# Patient Record
Sex: FEMALE | Race: WHITE | NOT HISPANIC OR LATINO | Employment: OTHER | URBAN - METROPOLITAN AREA
[De-identification: names, ages, dates, MRNs, and addresses within clinical notes are randomized per-mention and may not be internally consistent; named-entity substitution may affect disease eponyms.]

---

## 2018-01-01 ENCOUNTER — HOSPITAL ENCOUNTER (INPATIENT)
Facility: REHABILITATION | Age: 78
LOS: 12 days | DRG: 057 | End: 2018-08-27
Attending: PHYSICAL MEDICINE & REHABILITATION | Admitting: PHYSICAL MEDICINE & REHABILITATION
Payer: MEDICARE

## 2018-01-01 ENCOUNTER — APPOINTMENT (OUTPATIENT)
Dept: RADIOLOGY | Facility: MEDICAL CENTER | Age: 78
DRG: 064 | End: 2018-01-01
Attending: INTERNAL MEDICINE
Payer: MEDICARE

## 2018-01-01 ENCOUNTER — HOSPITAL ENCOUNTER (INPATIENT)
Facility: MEDICAL CENTER | Age: 78
LOS: 4 days | DRG: 065 | End: 2018-08-15
Attending: HOSPITALIST | Admitting: HOSPITALIST
Payer: MEDICARE

## 2018-01-01 ENCOUNTER — APPOINTMENT (OUTPATIENT)
Dept: RADIOLOGY | Facility: REHABILITATION | Age: 78
DRG: 057 | End: 2018-01-01
Attending: PHYSICAL MEDICINE & REHABILITATION
Payer: MEDICARE

## 2018-01-01 ENCOUNTER — APPOINTMENT (OUTPATIENT)
Dept: RADIOLOGY | Facility: REHABILITATION | Age: 78
DRG: 057 | End: 2018-01-01
Attending: HOSPITALIST
Payer: MEDICARE

## 2018-01-01 ENCOUNTER — HOME CARE VISIT (OUTPATIENT)
Dept: HOSPICE | Facility: HOSPICE | Age: 78
End: 2018-01-01
Payer: MEDICARE

## 2018-01-01 ENCOUNTER — APPOINTMENT (OUTPATIENT)
Dept: RADIOLOGY | Facility: MEDICAL CENTER | Age: 78
DRG: 064 | End: 2018-01-01
Attending: NURSE PRACTITIONER
Payer: MEDICARE

## 2018-01-01 ENCOUNTER — HOSPICE ADMISSION (OUTPATIENT)
Dept: HOSPICE | Facility: HOSPICE | Age: 78
End: 2018-01-01
Payer: MEDICARE

## 2018-01-01 ENCOUNTER — HOSPITAL ENCOUNTER (OUTPATIENT)
Dept: RADIOLOGY | Facility: MEDICAL CENTER | Age: 78
End: 2018-08-11

## 2018-01-01 ENCOUNTER — APPOINTMENT (OUTPATIENT)
Dept: RADIOLOGY | Facility: MEDICAL CENTER | Age: 78
DRG: 064 | End: 2018-01-01
Attending: PSYCHIATRY & NEUROLOGY
Payer: MEDICARE

## 2018-01-01 ENCOUNTER — HOSPITAL ENCOUNTER (OUTPATIENT)
Facility: MEDICAL CENTER | Age: 78
DRG: 065 | End: 2018-08-11
Payer: MEDICARE

## 2018-01-01 ENCOUNTER — HOSPITAL ENCOUNTER (INPATIENT)
Facility: MEDICAL CENTER | Age: 78
LOS: 1 days | DRG: 951 | End: 2018-09-19
Attending: INTERNAL MEDICINE | Admitting: INTERNAL MEDICINE
Payer: COMMERCIAL

## 2018-01-01 ENCOUNTER — APPOINTMENT (OUTPATIENT)
Dept: RADIOLOGY | Facility: MEDICAL CENTER | Age: 78
DRG: 065 | End: 2018-01-01
Attending: PSYCHIATRY & NEUROLOGY
Payer: MEDICARE

## 2018-01-01 ENCOUNTER — APPOINTMENT (OUTPATIENT)
Dept: RADIOLOGY | Facility: MEDICAL CENTER | Age: 78
DRG: 065 | End: 2018-01-01
Attending: FAMILY MEDICINE
Payer: MEDICARE

## 2018-01-01 ENCOUNTER — APPOINTMENT (OUTPATIENT)
Dept: PAIN MANAGEMENT | Facility: REHABILITATION | Age: 78
DRG: 057 | End: 2018-01-01
Attending: PHYSICAL MEDICINE & REHABILITATION
Payer: MEDICARE

## 2018-01-01 ENCOUNTER — RESOLUTE PROFESSIONAL BILLING HOSPITAL PROF FEE (OUTPATIENT)
Dept: PHYSICAL MEDICINE AND REHAB | Facility: REHABILITATION | Age: 78
End: 2018-01-01
Payer: MEDICARE

## 2018-01-01 ENCOUNTER — TELEPHONE (OUTPATIENT)
Dept: CARDIOLOGY | Facility: MEDICAL CENTER | Age: 78
End: 2018-01-01

## 2018-01-01 ENCOUNTER — HOSPITAL ENCOUNTER (OUTPATIENT)
Dept: RADIOLOGY | Facility: MEDICAL CENTER | Age: 78
End: 2018-09-11

## 2018-01-01 ENCOUNTER — HOSPITAL ENCOUNTER (INPATIENT)
Facility: MEDICAL CENTER | Age: 78
LOS: 7 days | DRG: 064 | End: 2018-09-18
Attending: EMERGENCY MEDICINE | Admitting: INTERNAL MEDICINE
Payer: MEDICARE

## 2018-01-01 VITALS
OXYGEN SATURATION: 59 % | HEIGHT: 64 IN | SYSTOLIC BLOOD PRESSURE: 75 MMHG | BODY MASS INDEX: 22.02 KG/M2 | DIASTOLIC BLOOD PRESSURE: 36 MMHG | HEART RATE: 69 BPM | TEMPERATURE: 98.9 F | WEIGHT: 129 LBS | RESPIRATION RATE: 20 BRPM

## 2018-01-01 VITALS
TEMPERATURE: 99.1 F | OXYGEN SATURATION: 80 % | DIASTOLIC BLOOD PRESSURE: 68 MMHG | RESPIRATION RATE: 20 BRPM | SYSTOLIC BLOOD PRESSURE: 152 MMHG | BODY MASS INDEX: 22.17 KG/M2 | HEIGHT: 64 IN | WEIGHT: 129.85 LBS | HEART RATE: 82 BPM

## 2018-01-01 VITALS
SYSTOLIC BLOOD PRESSURE: 142 MMHG | WEIGHT: 139.55 LBS | HEIGHT: 64 IN | OXYGEN SATURATION: 91 % | RESPIRATION RATE: 18 BRPM | BODY MASS INDEX: 23.82 KG/M2 | TEMPERATURE: 98.1 F | DIASTOLIC BLOOD PRESSURE: 60 MMHG | HEART RATE: 78 BPM

## 2018-01-01 VITALS
DIASTOLIC BLOOD PRESSURE: 65 MMHG | BODY MASS INDEX: 22.52 KG/M2 | TEMPERATURE: 97.3 F | OXYGEN SATURATION: 94 % | HEIGHT: 65 IN | SYSTOLIC BLOOD PRESSURE: 118 MMHG | RESPIRATION RATE: 15 BRPM | HEART RATE: 72 BPM | WEIGHT: 135.14 LBS

## 2018-01-01 VITALS — RESPIRATION RATE: 28 BRPM | HEART RATE: 100 BPM

## 2018-01-01 DIAGNOSIS — I63.412 CEREBROVASCULAR ACCIDENT (CVA) DUE TO EMBOLISM OF LEFT MIDDLE CEREBRAL ARTERY (HCC): ICD-10-CM

## 2018-01-01 DIAGNOSIS — I63.9 CEREBROVASCULAR ACCIDENT (CVA), UNSPECIFIED MECHANISM (HCC): ICD-10-CM

## 2018-01-01 DIAGNOSIS — I48.0 PAROXYSMAL A-FIB (HCC): ICD-10-CM

## 2018-01-01 DIAGNOSIS — R53.1 RIGHT SIDED WEAKNESS: ICD-10-CM

## 2018-01-01 LAB
25(OH)D3 SERPL-MCNC: 33 NG/ML (ref 30–100)
25(OH)D3 SERPL-MCNC: 40 NG/ML (ref 30–100)
ALBUMIN SERPL BCP-MCNC: 3.2 G/DL (ref 3.2–4.9)
ALBUMIN SERPL BCP-MCNC: 3.3 G/DL (ref 3.2–4.9)
ALBUMIN SERPL BCP-MCNC: 3.5 G/DL (ref 3.2–4.9)
ALBUMIN/GLOB SERPL: 1.2 G/DL
ALBUMIN/GLOB SERPL: 1.4 G/DL
ALBUMIN/GLOB SERPL: 1.4 G/DL
ALP SERPL-CCNC: 78 U/L (ref 30–99)
ALP SERPL-CCNC: 83 U/L (ref 30–99)
ALP SERPL-CCNC: 94 U/L (ref 30–99)
ALT SERPL-CCNC: 11 U/L (ref 2–50)
ALT SERPL-CCNC: 13 U/L (ref 2–50)
ALT SERPL-CCNC: 15 U/L (ref 2–50)
AMMONIA PLAS-SCNC: 35 UMOL/L (ref 11–45)
ANION GAP SERPL CALC-SCNC: 10 MMOL/L (ref 0–11.9)
ANION GAP SERPL CALC-SCNC: 11 MMOL/L (ref 0–11.9)
ANION GAP SERPL CALC-SCNC: 4 MMOL/L (ref 0–11.9)
ANION GAP SERPL CALC-SCNC: 6 MMOL/L (ref 0–11.9)
ANION GAP SERPL CALC-SCNC: 7 MMOL/L (ref 0–11.9)
ANION GAP SERPL CALC-SCNC: 7 MMOL/L (ref 0–11.9)
ANION GAP SERPL CALC-SCNC: 8 MMOL/L (ref 0–11.9)
ANION GAP SERPL CALC-SCNC: 8 MMOL/L (ref 0–11.9)
ANION GAP SERPL CALC-SCNC: 9 MMOL/L (ref 0–11.9)
APPEARANCE UR: ABNORMAL
APPEARANCE UR: ABNORMAL
APPEARANCE UR: CLEAR
AST SERPL-CCNC: 15 U/L (ref 12–45)
AST SERPL-CCNC: 17 U/L (ref 12–45)
AST SERPL-CCNC: 18 U/L (ref 12–45)
BACTERIA #/AREA URNS HPF: ABNORMAL /HPF
BACTERIA #/AREA URNS HPF: NEGATIVE /HPF
BACTERIA UR CULT: ABNORMAL
BACTERIA UR CULT: ABNORMAL
BACTERIA UR CULT: NORMAL
BASOPHILS # BLD AUTO: 0.1 % (ref 0–1.8)
BASOPHILS # BLD AUTO: 0.3 % (ref 0–1.8)
BASOPHILS # BLD AUTO: 0.4 % (ref 0–1.8)
BASOPHILS # BLD AUTO: 0.4 % (ref 0–1.8)
BASOPHILS # BLD: 0.01 K/UL (ref 0–0.12)
BASOPHILS # BLD: 0.03 K/UL (ref 0–0.12)
BASOPHILS # BLD: 0.04 K/UL (ref 0–0.12)
BASOPHILS # BLD: 0.05 K/UL (ref 0–0.12)
BILIRUB SERPL-MCNC: 0.3 MG/DL (ref 0.1–1.5)
BILIRUB SERPL-MCNC: 0.3 MG/DL (ref 0.1–1.5)
BILIRUB SERPL-MCNC: 0.4 MG/DL (ref 0.1–1.5)
BILIRUB UR QL STRIP.AUTO: NEGATIVE
BUN SERPL-MCNC: 11 MG/DL (ref 8–22)
BUN SERPL-MCNC: 11 MG/DL (ref 8–22)
BUN SERPL-MCNC: 13 MG/DL (ref 8–22)
BUN SERPL-MCNC: 14 MG/DL (ref 8–22)
BUN SERPL-MCNC: 18 MG/DL (ref 8–22)
BUN SERPL-MCNC: 24 MG/DL (ref 8–22)
BUN SERPL-MCNC: 27 MG/DL (ref 8–22)
CALCIUM SERPL-MCNC: 8.5 MG/DL (ref 8.5–10.5)
CALCIUM SERPL-MCNC: 8.6 MG/DL (ref 8.5–10.5)
CALCIUM SERPL-MCNC: 8.7 MG/DL (ref 8.5–10.5)
CALCIUM SERPL-MCNC: 8.7 MG/DL (ref 8.5–10.5)
CALCIUM SERPL-MCNC: 9 MG/DL (ref 8.5–10.5)
CALCIUM SERPL-MCNC: 9 MG/DL (ref 8.5–10.5)
CALCIUM SERPL-MCNC: 9.2 MG/DL (ref 8.5–10.5)
CALCIUM SERPL-MCNC: 9.2 MG/DL (ref 8.5–10.5)
CALCIUM SERPL-MCNC: 9.3 MG/DL (ref 8.5–10.5)
CALCIUM SERPL-MCNC: 9.5 MG/DL (ref 8.5–10.5)
CAOX CRY #/AREA URNS HPF: ABNORMAL /HPF
CAOX CRY #/AREA URNS HPF: ABNORMAL /HPF
CHLORIDE SERPL-SCNC: 106 MMOL/L (ref 96–112)
CHLORIDE SERPL-SCNC: 109 MMOL/L (ref 96–112)
CHLORIDE SERPL-SCNC: 110 MMOL/L (ref 96–112)
CHLORIDE SERPL-SCNC: 111 MMOL/L (ref 96–112)
CHLORIDE SERPL-SCNC: 111 MMOL/L (ref 96–112)
CHLORIDE SERPL-SCNC: 112 MMOL/L (ref 96–112)
CHLORIDE SERPL-SCNC: 115 MMOL/L (ref 96–112)
CHOLEST SERPL-MCNC: 115 MG/DL (ref 100–199)
CHOLEST SERPL-MCNC: 93 MG/DL (ref 100–199)
CHOLEST SERPL-MCNC: 94 MG/DL (ref 100–199)
CO2 SERPL-SCNC: 23 MMOL/L (ref 20–33)
CO2 SERPL-SCNC: 23 MMOL/L (ref 20–33)
CO2 SERPL-SCNC: 24 MMOL/L (ref 20–33)
CO2 SERPL-SCNC: 24 MMOL/L (ref 20–33)
CO2 SERPL-SCNC: 25 MMOL/L (ref 20–33)
CO2 SERPL-SCNC: 26 MMOL/L (ref 20–33)
CO2 SERPL-SCNC: 27 MMOL/L (ref 20–33)
CO2 SERPL-SCNC: 29 MMOL/L (ref 20–33)
COLOR UR: ABNORMAL
COLOR UR: YELLOW
CREAT SERPL-MCNC: 0.6 MG/DL (ref 0.5–1.4)
CREAT SERPL-MCNC: 0.64 MG/DL (ref 0.5–1.4)
CREAT SERPL-MCNC: 0.66 MG/DL (ref 0.5–1.4)
CREAT SERPL-MCNC: 0.74 MG/DL (ref 0.5–1.4)
CREAT SERPL-MCNC: 0.81 MG/DL (ref 0.5–1.4)
CREAT SERPL-MCNC: 0.81 MG/DL (ref 0.5–1.4)
CREAT SERPL-MCNC: 0.84 MG/DL (ref 0.5–1.4)
CREAT SERPL-MCNC: 0.86 MG/DL (ref 0.5–1.4)
CREAT SERPL-MCNC: 0.92 MG/DL (ref 0.5–1.4)
CREAT SERPL-MCNC: 1.05 MG/DL (ref 0.5–1.4)
CREAT SERPL-MCNC: 1.08 MG/DL (ref 0.5–1.4)
CREAT SERPL-MCNC: 1.38 MG/DL (ref 0.5–1.4)
EKG IMPRESSION: NORMAL
EOSINOPHIL # BLD AUTO: 0.3 K/UL (ref 0–0.51)
EOSINOPHIL # BLD AUTO: 0.39 K/UL (ref 0–0.51)
EOSINOPHIL # BLD AUTO: 0.5 K/UL (ref 0–0.51)
EOSINOPHIL # BLD AUTO: 0.92 K/UL (ref 0–0.51)
EOSINOPHIL # BLD AUTO: 1.18 K/UL (ref 0–0.51)
EOSINOPHIL # BLD AUTO: 1.3 K/UL (ref 0–0.51)
EOSINOPHIL NFR BLD: 10.6 % (ref 0–6.9)
EOSINOPHIL NFR BLD: 11.3 % (ref 0–6.9)
EOSINOPHIL NFR BLD: 2.5 % (ref 0–6.9)
EOSINOPHIL NFR BLD: 3.7 % (ref 0–6.9)
EOSINOPHIL NFR BLD: 4.3 % (ref 0–6.9)
EOSINOPHIL NFR BLD: 8 % (ref 0–6.9)
EPI CELLS #/AREA URNS HPF: ABNORMAL /HPF
EPI CELLS #/AREA URNS HPF: NEGATIVE /HPF
ERYTHROCYTE [DISTWIDTH] IN BLOOD BY AUTOMATED COUNT: 49.7 FL (ref 35.9–50)
ERYTHROCYTE [DISTWIDTH] IN BLOOD BY AUTOMATED COUNT: 50.5 FL (ref 35.9–50)
ERYTHROCYTE [DISTWIDTH] IN BLOOD BY AUTOMATED COUNT: 50.8 FL (ref 35.9–50)
ERYTHROCYTE [DISTWIDTH] IN BLOOD BY AUTOMATED COUNT: 51.1 FL (ref 35.9–50)
ERYTHROCYTE [DISTWIDTH] IN BLOOD BY AUTOMATED COUNT: 51.2 FL (ref 35.9–50)
ERYTHROCYTE [DISTWIDTH] IN BLOOD BY AUTOMATED COUNT: 51.8 FL (ref 35.9–50)
ERYTHROCYTE [DISTWIDTH] IN BLOOD BY AUTOMATED COUNT: 51.8 FL (ref 35.9–50)
ERYTHROCYTE [DISTWIDTH] IN BLOOD BY AUTOMATED COUNT: 51.9 FL (ref 35.9–50)
ERYTHROCYTE [DISTWIDTH] IN BLOOD BY AUTOMATED COUNT: 53.1 FL (ref 35.9–50)
ERYTHROCYTE [DISTWIDTH] IN BLOOD BY AUTOMATED COUNT: 53.9 FL (ref 35.9–50)
ERYTHROCYTE [DISTWIDTH] IN BLOOD BY AUTOMATED COUNT: 55.8 FL (ref 35.9–50)
EST. AVERAGE GLUCOSE BLD GHB EST-MCNC: 114 MG/DL
EST. AVERAGE GLUCOSE BLD GHB EST-MCNC: 120 MG/DL
EST. AVERAGE GLUCOSE BLD GHB EST-MCNC: 128 MG/DL
FERRITIN SERPL-MCNC: 49.3 NG/ML (ref 10–291)
FOLATE SERPL-MCNC: 23.2 NG/ML
GLOBULIN SER CALC-MCNC: 2.4 G/DL (ref 1.9–3.5)
GLOBULIN SER CALC-MCNC: 2.5 G/DL (ref 1.9–3.5)
GLOBULIN SER CALC-MCNC: 2.7 G/DL (ref 1.9–3.5)
GLUCOSE SERPL-MCNC: 101 MG/DL (ref 65–99)
GLUCOSE SERPL-MCNC: 101 MG/DL (ref 65–99)
GLUCOSE SERPL-MCNC: 111 MG/DL (ref 65–99)
GLUCOSE SERPL-MCNC: 118 MG/DL (ref 65–99)
GLUCOSE SERPL-MCNC: 121 MG/DL (ref 65–99)
GLUCOSE SERPL-MCNC: 142 MG/DL (ref 65–99)
GLUCOSE SERPL-MCNC: 81 MG/DL (ref 65–99)
GLUCOSE SERPL-MCNC: 87 MG/DL (ref 65–99)
GLUCOSE SERPL-MCNC: 88 MG/DL (ref 65–99)
GLUCOSE SERPL-MCNC: 90 MG/DL (ref 65–99)
GLUCOSE SERPL-MCNC: 92 MG/DL (ref 65–99)
GLUCOSE SERPL-MCNC: 98 MG/DL (ref 65–99)
GLUCOSE UR STRIP.AUTO-MCNC: NEGATIVE MG/DL
HBA1C MFR BLD: 5.6 % (ref 0–5.6)
HBA1C MFR BLD: 5.8 % (ref 0–5.6)
HBA1C MFR BLD: 6.1 % (ref 0–5.6)
HCT VFR BLD AUTO: 31.2 % (ref 37–47)
HCT VFR BLD AUTO: 32 % (ref 37–47)
HCT VFR BLD AUTO: 32.5 % (ref 37–47)
HCT VFR BLD AUTO: 32.7 % (ref 37–47)
HCT VFR BLD AUTO: 33.1 % (ref 37–47)
HCT VFR BLD AUTO: 33.4 % (ref 37–47)
HCT VFR BLD AUTO: 34 % (ref 37–47)
HCT VFR BLD AUTO: 34.6 % (ref 37–47)
HCT VFR BLD AUTO: 34.7 % (ref 37–47)
HCT VFR BLD AUTO: 35.4 % (ref 37–47)
HCT VFR BLD AUTO: 35.7 % (ref 37–47)
HDLC SERPL-MCNC: 39 MG/DL
HDLC SERPL-MCNC: 41 MG/DL
HDLC SERPL-MCNC: 49 MG/DL
HGB BLD-MCNC: 10.1 G/DL (ref 12–16)
HGB BLD-MCNC: 10.4 G/DL (ref 12–16)
HGB BLD-MCNC: 10.4 G/DL (ref 12–16)
HGB BLD-MCNC: 10.5 G/DL (ref 12–16)
HGB BLD-MCNC: 10.5 G/DL (ref 12–16)
HGB BLD-MCNC: 10.7 G/DL (ref 12–16)
HGB BLD-MCNC: 10.8 G/DL (ref 12–16)
HGB BLD-MCNC: 11 G/DL (ref 12–16)
HGB BLD-MCNC: 11.1 G/DL (ref 12–16)
HGB BLD-MCNC: 11.4 G/DL (ref 12–16)
HGB BLD-MCNC: 11.4 G/DL (ref 12–16)
HGB RETIC QN AUTO: 29.9 PG/CELL (ref 29–35)
HYALINE CASTS #/AREA URNS LPF: ABNORMAL /LPF
IMM GRANULOCYTES # BLD AUTO: 0.03 K/UL (ref 0–0.11)
IMM GRANULOCYTES # BLD AUTO: 0.03 K/UL (ref 0–0.11)
IMM GRANULOCYTES # BLD AUTO: 0.04 K/UL (ref 0–0.11)
IMM GRANULOCYTES # BLD AUTO: 0.1 K/UL (ref 0–0.11)
IMM GRANULOCYTES NFR BLD AUTO: 0.3 % (ref 0–0.9)
IMM GRANULOCYTES NFR BLD AUTO: 0.4 % (ref 0–0.9)
IMM GRANULOCYTES NFR BLD AUTO: 0.4 % (ref 0–0.9)
IMM GRANULOCYTES NFR BLD AUTO: 0.9 % (ref 0–0.9)
IMM RETICS NFR: 12.7 % (ref 9.3–17.4)
INR PPP: 1.01 (ref 0.87–1.13)
IRON SATN MFR SERPL: 13 % (ref 15–55)
IRON SERPL-MCNC: 35 UG/DL (ref 40–170)
KETONES UR STRIP.AUTO-MCNC: 15 MG/DL
KETONES UR STRIP.AUTO-MCNC: ABNORMAL MG/DL
KETONES UR STRIP.AUTO-MCNC: NEGATIVE MG/DL
LACTATE BLD-SCNC: 1.3 MMOL/L (ref 0.5–2)
LDH SERPL L TO P-CCNC: 139 U/L (ref 107–266)
LDLC SERPL CALC-MCNC: 36 MG/DL
LDLC SERPL CALC-MCNC: 42 MG/DL
LDLC SERPL CALC-MCNC: 52 MG/DL
LEUKOCYTE ESTERASE UR QL STRIP.AUTO: ABNORMAL
LEUKOCYTE ESTERASE UR QL STRIP.AUTO: NEGATIVE
LV EJECT FRACT  99904: 65
LV EJECT FRACT MOD 2C 99903: 78.51
LV EJECT FRACT MOD 4C 99902: 67.11
LV EJECT FRACT MOD BP 99901: 73.45
LYMPHOCYTES # BLD AUTO: 2.13 K/UL (ref 1–4.8)
LYMPHOCYTES # BLD AUTO: 2.38 K/UL (ref 1–4.8)
LYMPHOCYTES # BLD AUTO: 2.81 K/UL (ref 1–4.8)
LYMPHOCYTES # BLD AUTO: 3.79 K/UL (ref 1–4.8)
LYMPHOCYTES # BLD AUTO: 4.63 K/UL (ref 1–4.8)
LYMPHOCYTES # BLD AUTO: 4.77 K/UL (ref 1–4.8)
LYMPHOCYTES NFR BLD: 20.2 % (ref 22–41)
LYMPHOCYTES NFR BLD: 20.5 % (ref 22–41)
LYMPHOCYTES NFR BLD: 23.8 % (ref 22–41)
LYMPHOCYTES NFR BLD: 34.2 % (ref 22–41)
LYMPHOCYTES NFR BLD: 40.4 % (ref 22–41)
LYMPHOCYTES NFR BLD: 41.5 % (ref 22–41)
MAGNESIUM SERPL-MCNC: 1.6 MG/DL (ref 1.5–2.5)
MAGNESIUM SERPL-MCNC: 1.8 MG/DL (ref 1.5–2.5)
MAGNESIUM SERPL-MCNC: 1.9 MG/DL (ref 1.5–2.5)
MCH RBC QN AUTO: 29.5 PG (ref 27–33)
MCH RBC QN AUTO: 29.6 PG (ref 27–33)
MCH RBC QN AUTO: 29.7 PG (ref 27–33)
MCH RBC QN AUTO: 29.8 PG (ref 27–33)
MCH RBC QN AUTO: 29.9 PG (ref 27–33)
MCH RBC QN AUTO: 30.1 PG (ref 27–33)
MCH RBC QN AUTO: 30.2 PG (ref 27–33)
MCH RBC QN AUTO: 30.4 PG (ref 27–33)
MCH RBC QN AUTO: 30.5 PG (ref 27–33)
MCH RBC QN AUTO: 30.6 PG (ref 27–33)
MCH RBC QN AUTO: 30.7 PG (ref 27–33)
MCHC RBC AUTO-ENTMCNC: 30.8 G/DL (ref 33.6–35)
MCHC RBC AUTO-ENTMCNC: 31.1 G/DL (ref 33.6–35)
MCHC RBC AUTO-ENTMCNC: 31.4 G/DL (ref 33.6–35)
MCHC RBC AUTO-ENTMCNC: 31.7 G/DL (ref 33.6–35)
MCHC RBC AUTO-ENTMCNC: 32 G/DL (ref 33.6–35)
MCHC RBC AUTO-ENTMCNC: 32.2 G/DL (ref 33.6–35)
MCHC RBC AUTO-ENTMCNC: 32.4 G/DL (ref 33.6–35)
MCHC RBC AUTO-ENTMCNC: 32.5 G/DL (ref 33.6–35)
MCHC RBC AUTO-ENTMCNC: 32.6 G/DL (ref 33.6–35)
MCHC RBC AUTO-ENTMCNC: 32.7 G/DL (ref 33.6–35)
MCHC RBC AUTO-ENTMCNC: 32.9 G/DL (ref 33.6–35)
MCV RBC AUTO: 91.8 FL (ref 81.4–97.8)
MCV RBC AUTO: 92 FL (ref 81.4–97.8)
MCV RBC AUTO: 93 FL (ref 81.4–97.8)
MCV RBC AUTO: 93.4 FL (ref 81.4–97.8)
MCV RBC AUTO: 93.7 FL (ref 81.4–97.8)
MCV RBC AUTO: 94 FL (ref 81.4–97.8)
MCV RBC AUTO: 94.2 FL (ref 81.4–97.8)
MCV RBC AUTO: 94.6 FL (ref 81.4–97.8)
MCV RBC AUTO: 94.9 FL (ref 81.4–97.8)
MCV RBC AUTO: 95.9 FL (ref 81.4–97.8)
MCV RBC AUTO: 96.2 FL (ref 81.4–97.8)
MICRO URNS: ABNORMAL
MICRO URNS: NORMAL
MONOCYTES # BLD AUTO: 0.62 K/UL (ref 0–0.85)
MONOCYTES # BLD AUTO: 0.65 K/UL (ref 0–0.85)
MONOCYTES # BLD AUTO: 0.76 K/UL (ref 0–0.85)
MONOCYTES # BLD AUTO: 0.76 K/UL (ref 0–0.85)
MONOCYTES # BLD AUTO: 0.81 K/UL (ref 0–0.85)
MONOCYTES # BLD AUTO: 0.81 K/UL (ref 0–0.85)
MONOCYTES NFR BLD AUTO: 5.6 % (ref 0–13.4)
MONOCYTES NFR BLD AUTO: 5.7 % (ref 0–13.4)
MONOCYTES NFR BLD AUTO: 6.4 % (ref 0–13.4)
MONOCYTES NFR BLD AUTO: 7 % (ref 0–13.4)
MONOCYTES NFR BLD AUTO: 7.1 % (ref 0–13.4)
MONOCYTES NFR BLD AUTO: 7.2 % (ref 0–13.4)
NEUTROPHILS # BLD AUTO: 4.69 K/UL (ref 2–7.15)
NEUTROPHILS # BLD AUTO: 5.02 K/UL (ref 2–7.15)
NEUTROPHILS # BLD AUTO: 5.42 K/UL (ref 2–7.15)
NEUTROPHILS # BLD AUTO: 7.24 K/UL (ref 2–7.15)
NEUTROPHILS # BLD AUTO: 7.81 K/UL (ref 2–7.15)
NEUTROPHILS # BLD AUTO: 7.87 K/UL (ref 2–7.15)
NEUTROPHILS NFR BLD: 40.8 % (ref 44–72)
NEUTROPHILS NFR BLD: 43.9 % (ref 44–72)
NEUTROPHILS NFR BLD: 48.8 % (ref 44–72)
NEUTROPHILS NFR BLD: 66.7 % (ref 44–72)
NEUTROPHILS NFR BLD: 67 % (ref 44–72)
NEUTROPHILS NFR BLD: 68.4 % (ref 44–72)
NITRITE UR QL STRIP.AUTO: NEGATIVE
NRBC # BLD AUTO: 0 K/UL
NRBC BLD-RTO: 0 /100 WBC
PH UR STRIP.AUTO: 5 [PH]
PH UR STRIP.AUTO: 5 [PH]
PH UR STRIP.AUTO: 5.5 [PH]
PH UR STRIP.AUTO: 5.5 [PH]
PH UR STRIP.AUTO: 6.5 [PH]
PHOSPHATE SERPL-MCNC: 3.1 MG/DL (ref 2.5–4.5)
PLATELET # BLD AUTO: 226 K/UL (ref 164–446)
PLATELET # BLD AUTO: 228 K/UL (ref 164–446)
PLATELET # BLD AUTO: 228 K/UL (ref 164–446)
PLATELET # BLD AUTO: 229 K/UL (ref 164–446)
PLATELET # BLD AUTO: 229 K/UL (ref 164–446)
PLATELET # BLD AUTO: 238 K/UL (ref 164–446)
PLATELET # BLD AUTO: 250 K/UL (ref 164–446)
PLATELET # BLD AUTO: 254 K/UL (ref 164–446)
PLATELET # BLD AUTO: 256 K/UL (ref 164–446)
PLATELET # BLD AUTO: 257 K/UL (ref 164–446)
PLATELET # BLD AUTO: 266 K/UL (ref 164–446)
PMV BLD AUTO: 10.3 FL (ref 9–12.9)
PMV BLD AUTO: 10.4 FL (ref 9–12.9)
PMV BLD AUTO: 10.7 FL (ref 9–12.9)
PMV BLD AUTO: 10.7 FL (ref 9–12.9)
PMV BLD AUTO: 10.8 FL (ref 9–12.9)
PMV BLD AUTO: 10.9 FL (ref 9–12.9)
PMV BLD AUTO: 11.1 FL (ref 9–12.9)
PMV BLD AUTO: 11.1 FL (ref 9–12.9)
PMV BLD AUTO: 11.2 FL (ref 9–12.9)
PMV BLD AUTO: 11.2 FL (ref 9–12.9)
PMV BLD AUTO: 11.4 FL (ref 9–12.9)
POTASSIUM SERPL-SCNC: 3 MMOL/L (ref 3.6–5.5)
POTASSIUM SERPL-SCNC: 3.2 MMOL/L (ref 3.6–5.5)
POTASSIUM SERPL-SCNC: 3.2 MMOL/L (ref 3.6–5.5)
POTASSIUM SERPL-SCNC: 3.5 MMOL/L (ref 3.6–5.5)
POTASSIUM SERPL-SCNC: 3.6 MMOL/L (ref 3.6–5.5)
POTASSIUM SERPL-SCNC: 3.7 MMOL/L (ref 3.6–5.5)
POTASSIUM SERPL-SCNC: 3.8 MMOL/L (ref 3.6–5.5)
POTASSIUM SERPL-SCNC: 4 MMOL/L (ref 3.6–5.5)
PROCALCITONIN SERPL-MCNC: <0.05 NG/ML
PROT SERPL-MCNC: 5.7 G/DL (ref 6–8.2)
PROT SERPL-MCNC: 5.9 G/DL (ref 6–8.2)
PROT SERPL-MCNC: 6 G/DL (ref 6–8.2)
PROT UR QL STRIP: 30 MG/DL
PROT UR QL STRIP: NEGATIVE MG/DL
PROTHROMBIN TIME: 13 SEC (ref 12–14.6)
RBC # BLD AUTO: 3.32 M/UL (ref 4.2–5.4)
RBC # BLD AUTO: 3.45 M/UL (ref 4.2–5.4)
RBC # BLD AUTO: 3.48 M/UL (ref 4.2–5.4)
RBC # BLD AUTO: 3.49 M/UL (ref 4.2–5.4)
RBC # BLD AUTO: 3.53 M/UL (ref 4.2–5.4)
RBC # BLD AUTO: 3.56 M/UL (ref 4.2–5.4)
RBC # BLD AUTO: 3.62 M/UL (ref 4.2–5.4)
RBC # BLD AUTO: 3.64 M/UL (ref 4.2–5.4)
RBC # BLD AUTO: 3.71 M/UL (ref 4.2–5.4)
RBC # BLD AUTO: 3.73 M/UL (ref 4.2–5.4)
RBC # BLD AUTO: 3.77 M/UL (ref 4.2–5.4)
RBC # URNS HPF: ABNORMAL /HPF
RBC UR QL AUTO: ABNORMAL
RBC UR QL AUTO: ABNORMAL
RBC UR QL AUTO: NEGATIVE
RETICS # AUTO: 0.07 M/UL (ref 0.04–0.06)
RETICS/RBC NFR: 1.8 % (ref 0.8–2.1)
SIGNIFICANT IND 70042: ABNORMAL
SIGNIFICANT IND 70042: NORMAL
SITE SITE: ABNORMAL
SITE SITE: NORMAL
SODIUM SERPL-SCNC: 140 MMOL/L (ref 135–145)
SODIUM SERPL-SCNC: 142 MMOL/L (ref 135–145)
SODIUM SERPL-SCNC: 143 MMOL/L (ref 135–145)
SODIUM SERPL-SCNC: 143 MMOL/L (ref 135–145)
SODIUM SERPL-SCNC: 144 MMOL/L (ref 135–145)
SODIUM SERPL-SCNC: 145 MMOL/L (ref 135–145)
SODIUM SERPL-SCNC: 146 MMOL/L (ref 135–145)
SODIUM SERPL-SCNC: 146 MMOL/L (ref 135–145)
SOURCE SOURCE: ABNORMAL
SOURCE SOURCE: NORMAL
SP GR UR STRIP.AUTO: 1.01
SP GR UR STRIP.AUTO: 1.02
SP GR UR STRIP.AUTO: 1.02
T4 FREE SERPL-MCNC: 1.2 NG/DL (ref 0.53–1.43)
T4 FREE SERPL-MCNC: 1.4 NG/DL (ref 0.53–1.43)
TIBC SERPL-MCNC: 273 UG/DL (ref 250–450)
TRANSFERRIN SERPL-MCNC: 197 MG/DL (ref 200–370)
TRIGL SERPL-MCNC: 67 MG/DL (ref 0–149)
TRIGL SERPL-MCNC: 72 MG/DL (ref 0–149)
TRIGL SERPL-MCNC: 80 MG/DL (ref 0–149)
TROPONIN I SERPL-MCNC: 0.02 NG/ML (ref 0–0.04)
TSH SERPL DL<=0.005 MIU/L-ACNC: 0.24 UIU/ML (ref 0.38–5.33)
TSH SERPL DL<=0.005 MIU/L-ACNC: 1.67 UIU/ML (ref 0.38–5.33)
TSH SERPL DL<=0.005 MIU/L-ACNC: 5.88 UIU/ML (ref 0.38–5.33)
UROBILINOGEN UR STRIP.AUTO-MCNC: 0.2 MG/DL
VIT B12 SERPL-MCNC: >1500 PG/ML (ref 211–911)
WBC # BLD AUTO: 10.6 K/UL (ref 4.8–10.8)
WBC # BLD AUTO: 11.1 K/UL (ref 4.8–10.8)
WBC # BLD AUTO: 11.2 K/UL (ref 4.8–10.8)
WBC # BLD AUTO: 11.4 K/UL (ref 4.8–10.8)
WBC # BLD AUTO: 11.5 K/UL (ref 4.8–10.8)
WBC # BLD AUTO: 11.5 K/UL (ref 4.8–10.8)
WBC # BLD AUTO: 11.6 K/UL (ref 4.8–10.8)
WBC # BLD AUTO: 11.8 K/UL (ref 4.8–10.8)
WBC # BLD AUTO: 12.9 K/UL (ref 4.8–10.8)
WBC # BLD AUTO: 8.9 K/UL (ref 4.8–10.8)
WBC # BLD AUTO: 9.5 K/UL (ref 4.8–10.8)
WBC #/AREA URNS HPF: ABNORMAL /HPF

## 2018-01-01 PROCEDURE — A9270 NON-COVERED ITEM OR SERVICE: HCPCS | Performed by: HOSPITALIST

## 2018-01-01 PROCEDURE — 80048 BASIC METABOLIC PNL TOTAL CA: CPT

## 2018-01-01 PROCEDURE — 700102 HCHG RX REV CODE 250 W/ 637 OVERRIDE(OP): Performed by: INTERNAL MEDICINE

## 2018-01-01 PROCEDURE — 97162 PT EVAL MOD COMPLEX 30 MIN: CPT

## 2018-01-01 PROCEDURE — 80053 COMPREHEN METABOLIC PANEL: CPT

## 2018-01-01 PROCEDURE — 97116 GAIT TRAINING THERAPY: CPT

## 2018-01-01 PROCEDURE — 700102 HCHG RX REV CODE 250 W/ 637 OVERRIDE(OP): Performed by: HOSPITALIST

## 2018-01-01 PROCEDURE — 99232 SBSQ HOSP IP/OBS MODERATE 35: CPT | Performed by: INTERNAL MEDICINE

## 2018-01-01 PROCEDURE — 99232 SBSQ HOSP IP/OBS MODERATE 35: CPT | Performed by: PHYSICAL MEDICINE & REHABILITATION

## 2018-01-01 PROCEDURE — 84484 ASSAY OF TROPONIN QUANT: CPT

## 2018-01-01 PROCEDURE — 700111 HCHG RX REV CODE 636 W/ 250 OVERRIDE (IP): Performed by: PSYCHIATRY & NEUROLOGY

## 2018-01-01 PROCEDURE — 700105 HCHG RX REV CODE 258: Performed by: FAMILY MEDICINE

## 2018-01-01 PROCEDURE — 770020 HCHG ROOM/CARE - TELE (206)

## 2018-01-01 PROCEDURE — A9270 NON-COVERED ITEM OR SERVICE: HCPCS | Performed by: INTERNAL MEDICINE

## 2018-01-01 PROCEDURE — 700111 HCHG RX REV CODE 636 W/ 250 OVERRIDE (IP): Performed by: PHYSICAL MEDICINE & REHABILITATION

## 2018-01-01 PROCEDURE — A9270 NON-COVERED ITEM OR SERVICE: HCPCS | Performed by: PHYSICAL MEDICINE & REHABILITATION

## 2018-01-01 PROCEDURE — 82140 ASSAY OF AMMONIA: CPT

## 2018-01-01 PROCEDURE — G0299 HHS/HOSPICE OF RN EA 15 MIN: HCPCS

## 2018-01-01 PROCEDURE — 700111 HCHG RX REV CODE 636 W/ 250 OVERRIDE (IP): Performed by: NURSE PRACTITIONER

## 2018-01-01 PROCEDURE — 700101 HCHG RX REV CODE 250: Performed by: NURSE PRACTITIONER

## 2018-01-01 PROCEDURE — 4A00X4Z MEASUREMENT OF CENTRAL NERVOUS ELECTRICAL ACTIVITY, EXTERNAL APPROACH: ICD-10-PCS | Performed by: PSYCHIATRY & NEUROLOGY

## 2018-01-01 PROCEDURE — 770010 HCHG ROOM/CARE - REHAB SEMI PRIVAT*

## 2018-01-01 PROCEDURE — 99232 SBSQ HOSP IP/OBS MODERATE 35: CPT | Performed by: HOSPITALIST

## 2018-01-01 PROCEDURE — 700102 HCHG RX REV CODE 250 W/ 637 OVERRIDE(OP): Performed by: NURSE PRACTITIONER

## 2018-01-01 PROCEDURE — 85025 COMPLETE CBC W/AUTO DIFF WBC: CPT

## 2018-01-01 PROCEDURE — 36415 COLL VENOUS BLD VENIPUNCTURE: CPT

## 2018-01-01 PROCEDURE — 770006 HCHG ROOM/CARE - MED/SURG/GYN SEMI*

## 2018-01-01 PROCEDURE — 84466 ASSAY OF TRANSFERRIN: CPT

## 2018-01-01 PROCEDURE — 92526 ORAL FUNCTION THERAPY: CPT

## 2018-01-01 PROCEDURE — 70498 CT ANGIOGRAPHY NECK: CPT

## 2018-01-01 PROCEDURE — 700101 HCHG RX REV CODE 250: Performed by: INTERNAL MEDICINE

## 2018-01-01 PROCEDURE — 97530 THERAPEUTIC ACTIVITIES: CPT

## 2018-01-01 PROCEDURE — 770022 HCHG ROOM/CARE - ICU (200)

## 2018-01-01 PROCEDURE — G8988 SELF CARE GOAL STATUS: HCPCS | Mod: CK

## 2018-01-01 PROCEDURE — 70547 MR ANGIOGRAPHY NECK W/O DYE: CPT

## 2018-01-01 PROCEDURE — 700102 HCHG RX REV CODE 250 W/ 637 OVERRIDE(OP): Performed by: PHYSICAL MEDICINE & REHABILITATION

## 2018-01-01 PROCEDURE — 700111 HCHG RX REV CODE 636 W/ 250 OVERRIDE (IP): Performed by: HOSPITALIST

## 2018-01-01 PROCEDURE — 92508 TX SP LANG VOICE COMM GROUP: CPT

## 2018-01-01 PROCEDURE — 92612 ENDOSCOPY SWALLOW (FEES) VID: CPT

## 2018-01-01 PROCEDURE — G8987 SELF CARE CURRENT STATUS: HCPCS | Mod: CJ

## 2018-01-01 PROCEDURE — 700111 HCHG RX REV CODE 636 W/ 250 OVERRIDE (IP): Performed by: INTERNAL MEDICINE

## 2018-01-01 PROCEDURE — 70551 MRI BRAIN STEM W/O DYE: CPT

## 2018-01-01 PROCEDURE — A9270 NON-COVERED ITEM OR SERVICE: HCPCS | Performed by: NURSE PRACTITIONER

## 2018-01-01 PROCEDURE — 700102 HCHG RX REV CODE 250 W/ 637 OVERRIDE(OP): Performed by: FAMILY MEDICINE

## 2018-01-01 PROCEDURE — 90471 IMMUNIZATION ADMIN: CPT

## 2018-01-01 PROCEDURE — 82306 VITAMIN D 25 HYDROXY: CPT

## 2018-01-01 PROCEDURE — 97535 SELF CARE MNGMENT TRAINING: CPT

## 2018-01-01 PROCEDURE — A9270 NON-COVERED ITEM OR SERVICE: HCPCS | Performed by: FAMILY MEDICINE

## 2018-01-01 PROCEDURE — 74230 X-RAY XM SWLNG FUNCJ C+: CPT

## 2018-01-01 PROCEDURE — 700105 HCHG RX REV CODE 258: Performed by: INTERNAL MEDICINE

## 2018-01-01 PROCEDURE — 84439 ASSAY OF FREE THYROXINE: CPT

## 2018-01-01 PROCEDURE — 770021 HCHG ROOM/CARE - ISO PRIVATE

## 2018-01-01 PROCEDURE — 71045 X-RAY EXAM CHEST 1 VIEW: CPT

## 2018-01-01 PROCEDURE — 97161 PT EVAL LOW COMPLEX 20 MIN: CPT

## 2018-01-01 PROCEDURE — 81003 URINALYSIS AUTO W/O SCOPE: CPT

## 2018-01-01 PROCEDURE — 92507 TX SP LANG VOICE COMM INDIV: CPT

## 2018-01-01 PROCEDURE — 83550 IRON BINDING TEST: CPT

## 2018-01-01 PROCEDURE — 99223 1ST HOSP IP/OBS HIGH 75: CPT | Performed by: HOSPITALIST

## 2018-01-01 PROCEDURE — 99239 HOSP IP/OBS DSCHRG MGMT >30: CPT | Performed by: HOSPITALIST

## 2018-01-01 PROCEDURE — 97110 THERAPEUTIC EXERCISES: CPT

## 2018-01-01 PROCEDURE — 83735 ASSAY OF MAGNESIUM: CPT

## 2018-01-01 PROCEDURE — 97112 NEUROMUSCULAR REEDUCATION: CPT

## 2018-01-01 PROCEDURE — 99239 HOSP IP/OBS DSCHRG MGMT >30: CPT | Performed by: INTERNAL MEDICINE

## 2018-01-01 PROCEDURE — 306565 RIGID MIT RESTRAINT(PAIR): Performed by: INTERNAL MEDICINE

## 2018-01-01 PROCEDURE — 700111 HCHG RX REV CODE 636 W/ 250 OVERRIDE (IP): Performed by: FAMILY MEDICINE

## 2018-01-01 PROCEDURE — 3E0234Z INTRODUCTION OF SERUM, TOXOID AND VACCINE INTO MUSCLE, PERCUTANEOUS APPROACH: ICD-10-PCS | Performed by: INTERNAL MEDICINE

## 2018-01-01 PROCEDURE — 82607 VITAMIN B-12: CPT

## 2018-01-01 PROCEDURE — G8980 MOBILITY D/C STATUS: HCPCS | Mod: CM

## 2018-01-01 PROCEDURE — 93005 ELECTROCARDIOGRAM TRACING: CPT | Performed by: FAMILY MEDICINE

## 2018-01-01 PROCEDURE — 92610 EVALUATE SWALLOWING FUNCTION: CPT

## 2018-01-01 PROCEDURE — 700105 HCHG RX REV CODE 258: Performed by: NURSE PRACTITIONER

## 2018-01-01 PROCEDURE — 87086 URINE CULTURE/COLONY COUNT: CPT

## 2018-01-01 PROCEDURE — 93010 ELECTROCARDIOGRAM REPORT: CPT | Performed by: INTERNAL MEDICINE

## 2018-01-01 PROCEDURE — 99223 1ST HOSP IP/OBS HIGH 75: CPT | Performed by: INTERNAL MEDICINE

## 2018-01-01 PROCEDURE — 85046 RETICYTE/HGB CONCENTRATE: CPT

## 2018-01-01 PROCEDURE — 83605 ASSAY OF LACTIC ACID: CPT

## 2018-01-01 PROCEDURE — 94640 AIRWAY INHALATION TREATMENT: CPT

## 2018-01-01 PROCEDURE — 665036 HSPC NOTICE OF ELECTION NOE

## 2018-01-01 PROCEDURE — 82728 ASSAY OF FERRITIN: CPT

## 2018-01-01 PROCEDURE — G8979 MOBILITY GOAL STATUS: HCPCS | Mod: CK

## 2018-01-01 PROCEDURE — G8997 SWALLOW GOAL STATUS: HCPCS | Mod: CJ

## 2018-01-01 PROCEDURE — 81001 URINALYSIS AUTO W/SCOPE: CPT

## 2018-01-01 PROCEDURE — 92611 MOTION FLUOROSCOPY/SWALLOW: CPT

## 2018-01-01 PROCEDURE — 84443 ASSAY THYROID STIM HORMONE: CPT

## 2018-01-01 PROCEDURE — 80061 LIPID PANEL: CPT

## 2018-01-01 PROCEDURE — 85027 COMPLETE CBC AUTOMATED: CPT

## 2018-01-01 PROCEDURE — 302136 NUTRITION PUMP: Performed by: INTERNAL MEDICINE

## 2018-01-01 PROCEDURE — 99291 CRITICAL CARE FIRST HOUR: CPT | Mod: GC | Performed by: INTERNAL MEDICINE

## 2018-01-01 PROCEDURE — 700117 HCHG RX CONTRAST REV CODE 255: Performed by: PSYCHIATRY & NEUROLOGY

## 2018-01-01 PROCEDURE — 97165 OT EVAL LOW COMPLEX 30 MIN: CPT

## 2018-01-01 PROCEDURE — T2045 HOSPICE GENERAL CARE: HCPCS

## 2018-01-01 PROCEDURE — 84100 ASSAY OF PHOSPHORUS: CPT

## 2018-01-01 PROCEDURE — 99233 SBSQ HOSP IP/OBS HIGH 50: CPT | Performed by: HOSPITALIST

## 2018-01-01 PROCEDURE — 99233 SBSQ HOSP IP/OBS HIGH 50: CPT | Performed by: INTERNAL MEDICINE

## 2018-01-01 PROCEDURE — 99239 HOSP IP/OBS DSCHRG MGMT >30: CPT | Performed by: PHYSICAL MEDICINE & REHABILITATION

## 2018-01-01 PROCEDURE — 700102 HCHG RX REV CODE 250 W/ 637 OVERRIDE(OP): Performed by: PSYCHIATRY & NEUROLOGY

## 2018-01-01 PROCEDURE — G8988 SELF CARE GOAL STATUS: HCPCS | Mod: CI

## 2018-01-01 PROCEDURE — G0156 HHCP-SVS OF AIDE,EA 15 MIN: HCPCS

## 2018-01-01 PROCEDURE — L4398 FOOT DROP SPLINT PRE OTS: HCPCS

## 2018-01-01 PROCEDURE — 70450 CT HEAD/BRAIN W/O DYE: CPT

## 2018-01-01 PROCEDURE — G8996 SWALLOW CURRENT STATUS: HCPCS | Mod: CM

## 2018-01-01 PROCEDURE — G8978 MOBILITY CURRENT STATUS: HCPCS | Mod: CM

## 2018-01-01 PROCEDURE — 90662 IIV NO PRSV INCREASED AG IM: CPT | Performed by: INTERNAL MEDICINE

## 2018-01-01 PROCEDURE — G8979 MOBILITY GOAL STATUS: HCPCS | Mod: CI

## 2018-01-01 PROCEDURE — G8996 SWALLOW CURRENT STATUS: HCPCS | Mod: CJ

## 2018-01-01 PROCEDURE — 99291 CRITICAL CARE FIRST HOUR: CPT | Performed by: INTERNAL MEDICINE

## 2018-01-01 PROCEDURE — 92523 SPEECH SOUND LANG COMPREHEN: CPT

## 2018-01-01 PROCEDURE — 80048 BASIC METABOLIC PNL TOTAL CA: CPT | Mod: 91

## 2018-01-01 PROCEDURE — 93005 ELECTROCARDIOGRAM TRACING: CPT | Performed by: HOSPITALIST

## 2018-01-01 PROCEDURE — 82746 ASSAY OF FOLIC ACID SERUM: CPT

## 2018-01-01 PROCEDURE — G8997 SWALLOW GOAL STATUS: HCPCS | Mod: CK

## 2018-01-01 PROCEDURE — 95951 EEG: CPT | Mod: 52

## 2018-01-01 PROCEDURE — 700105 HCHG RX REV CODE 258: Performed by: PSYCHIATRY & NEUROLOGY

## 2018-01-01 PROCEDURE — 93880 EXTRACRANIAL BILAT STUDY: CPT

## 2018-01-01 PROCEDURE — G8978 MOBILITY CURRENT STATUS: HCPCS | Mod: CI

## 2018-01-01 PROCEDURE — 97167 OT EVAL HIGH COMPLEX 60 MIN: CPT

## 2018-01-01 PROCEDURE — 93306 TTE W/DOPPLER COMPLETE: CPT

## 2018-01-01 PROCEDURE — 76775 US EXAM ABDO BACK WALL LIM: CPT

## 2018-01-01 PROCEDURE — G8979 MOBILITY GOAL STATUS: HCPCS | Mod: CM

## 2018-01-01 PROCEDURE — 770001 HCHG ROOM/CARE - MED/SURG/GYN PRIV*

## 2018-01-01 PROCEDURE — 94760 N-INVAS EAR/PLS OXIMETRY 1: CPT

## 2018-01-01 PROCEDURE — 93306 TTE W/DOPPLER COMPLETE: CPT | Mod: 26 | Performed by: INTERNAL MEDICINE

## 2018-01-01 PROCEDURE — 306802 CATHETER,INSTAFLOW BOWEL: Performed by: INTERNAL MEDICINE

## 2018-01-01 PROCEDURE — 96105 ASSESSMENT OF APHASIA: CPT

## 2018-01-01 PROCEDURE — G8996 SWALLOW CURRENT STATUS: HCPCS | Mod: CL

## 2018-01-01 PROCEDURE — 83540 ASSAY OF IRON: CPT

## 2018-01-01 PROCEDURE — 70496 CT ANGIOGRAPHY HEAD: CPT

## 2018-01-01 PROCEDURE — A9270 NON-COVERED ITEM OR SERVICE: HCPCS | Performed by: PSYCHIATRY & NEUROLOGY

## 2018-01-01 PROCEDURE — 97150 GROUP THERAPEUTIC PROCEDURES: CPT

## 2018-01-01 PROCEDURE — 70544 MR ANGIOGRAPHY HEAD W/O DYE: CPT

## 2018-01-01 PROCEDURE — 83036 HEMOGLOBIN GLYCOSYLATED A1C: CPT

## 2018-01-01 PROCEDURE — 99223 1ST HOSP IP/OBS HIGH 75: CPT | Performed by: FAMILY MEDICINE

## 2018-01-01 PROCEDURE — 87077 CULTURE AEROBIC IDENTIFY: CPT

## 2018-01-01 PROCEDURE — 93880 EXTRACRANIAL BILAT STUDY: CPT | Mod: 26 | Performed by: SURGERY

## 2018-01-01 PROCEDURE — G0155 HHCP-SVS OF CSW,EA 15 MIN: HCPCS

## 2018-01-01 PROCEDURE — 84145 PROCALCITONIN (PCT): CPT

## 2018-01-01 PROCEDURE — G8996 SWALLOW CURRENT STATUS: HCPCS | Mod: CK

## 2018-01-01 PROCEDURE — 99291 CRITICAL CARE FIRST HOUR: CPT

## 2018-01-01 PROCEDURE — 99233 SBSQ HOSP IP/OBS HIGH 50: CPT | Performed by: PHYSICAL MEDICINE & REHABILITATION

## 2018-01-01 PROCEDURE — 302112 WASHCLOTH,PERINEAL CARE: Performed by: INTERNAL MEDICINE

## 2018-01-01 PROCEDURE — 90670 PCV13 VACCINE IM: CPT | Performed by: INTERNAL MEDICINE

## 2018-01-01 PROCEDURE — 700101 HCHG RX REV CODE 250

## 2018-01-01 PROCEDURE — G8987 SELF CARE CURRENT STATUS: HCPCS | Mod: CM

## 2018-01-01 PROCEDURE — 83615 LACTATE (LD) (LDH) ENZYME: CPT

## 2018-01-01 PROCEDURE — 85610 PROTHROMBIN TIME: CPT

## 2018-01-01 PROCEDURE — 87186 SC STD MICRODIL/AGAR DIL: CPT

## 2018-01-01 PROCEDURE — G8997 SWALLOW GOAL STATUS: HCPCS | Mod: CI

## 2018-01-01 PROCEDURE — 71046 X-RAY EXAM CHEST 2 VIEWS: CPT

## 2018-01-01 RX ORDER — POTASSIUM CHLORIDE 20 MEQ/1
20 TABLET, EXTENDED RELEASE ORAL ONCE
Status: COMPLETED | OUTPATIENT
Start: 2018-01-01 | End: 2018-01-01

## 2018-01-01 RX ORDER — LORAZEPAM 2 MG/ML
2 CONCENTRATE ORAL
Status: DISCONTINUED | OUTPATIENT
Start: 2018-01-01 | End: 2018-01-01 | Stop reason: HOSPADM

## 2018-01-01 RX ORDER — TRIAMTERENE AND HYDROCHLOROTHIAZIDE 37.5; 25 MG/1; MG/1
1 TABLET ORAL EVERY MORNING
Status: ON HOLD | COMMUNITY
End: 2018-01-01

## 2018-01-01 RX ORDER — ASPIRIN 300 MG/1
300 SUPPOSITORY RECTAL DAILY
Status: DISCONTINUED | OUTPATIENT
Start: 2018-01-01 | End: 2018-01-01

## 2018-01-01 RX ORDER — ONDANSETRON 2 MG/ML
4 INJECTION INTRAMUSCULAR; INTRAVENOUS 4 TIMES DAILY PRN
Status: DISCONTINUED | OUTPATIENT
Start: 2018-01-01 | End: 2018-01-01 | Stop reason: HOSPADM

## 2018-01-01 RX ORDER — GLYCOPYRROLATE 0.2 MG/ML
0.2 INJECTION INTRAMUSCULAR; INTRAVENOUS EVERY 4 HOURS PRN
Status: DISCONTINUED | OUTPATIENT
Start: 2018-01-01 | End: 2018-01-01 | Stop reason: HOSPADM

## 2018-01-01 RX ORDER — POLYETHYLENE GLYCOL 3350 17 G/17G
17 POWDER, FOR SOLUTION ORAL
Refills: 3 | Status: ON HOLD
Start: 2018-01-01 | End: 2018-01-01

## 2018-01-01 RX ORDER — LOSARTAN POTASSIUM 50 MG/1
50 TABLET ORAL
Status: DISCONTINUED | OUTPATIENT
Start: 2018-01-01 | End: 2018-01-01

## 2018-01-01 RX ORDER — MORPHINE SULFATE 10 MG/ML
5 INJECTION, SOLUTION INTRAMUSCULAR; INTRAVENOUS
Status: DISCONTINUED | OUTPATIENT
Start: 2018-01-01 | End: 2018-01-01 | Stop reason: HOSPADM

## 2018-01-01 RX ORDER — ATORVASTATIN CALCIUM 40 MG/1
40 TABLET, FILM COATED ORAL DAILY
Qty: 30 TAB | Refills: 2 | Status: ON HOLD | OUTPATIENT
Start: 2018-01-01 | End: 2018-01-01

## 2018-01-01 RX ORDER — BISACODYL 10 MG
10 SUPPOSITORY, RECTAL RECTAL
Status: DISCONTINUED | OUTPATIENT
Start: 2018-01-01 | End: 2018-01-01 | Stop reason: HOSPADM

## 2018-01-01 RX ORDER — IPRATROPIUM BROMIDE AND ALBUTEROL SULFATE 2.5; .5 MG/3ML; MG/3ML
3 SOLUTION RESPIRATORY (INHALATION)
Status: DISCONTINUED | OUTPATIENT
Start: 2018-01-01 | End: 2018-01-01 | Stop reason: HOSPADM

## 2018-01-01 RX ORDER — ECHINACEA PURPUREA EXTRACT 125 MG
2 TABLET ORAL PRN
Status: DISCONTINUED | OUTPATIENT
Start: 2018-01-01 | End: 2018-01-01 | Stop reason: HOSPADM

## 2018-01-01 RX ORDER — WARFARIN SODIUM 5 MG/1
5 TABLET ORAL
Status: DISCONTINUED | OUTPATIENT
Start: 2018-01-01 | End: 2018-01-01

## 2018-01-01 RX ORDER — NAPROXEN 500 MG/1
500 TABLET ORAL DAILY
Status: ON HOLD | COMMUNITY
End: 2018-01-01

## 2018-01-01 RX ORDER — ATORVASTATIN CALCIUM 40 MG/1
40 TABLET, FILM COATED ORAL EVERY EVENING
Status: DISCONTINUED | OUTPATIENT
Start: 2018-01-01 | End: 2018-01-01 | Stop reason: HOSPADM

## 2018-01-01 RX ORDER — AMOXICILLIN 250 MG
2 CAPSULE ORAL 2 TIMES DAILY PRN
Status: DISCONTINUED | OUTPATIENT
Start: 2018-01-01 | End: 2018-01-01 | Stop reason: HOSPADM

## 2018-01-01 RX ORDER — ASPIRIN 81 MG/1
81 TABLET, CHEWABLE ORAL DAILY
Status: DISCONTINUED | OUTPATIENT
Start: 2018-01-01 | End: 2018-01-01 | Stop reason: HOSPADM

## 2018-01-01 RX ORDER — HEPARIN SODIUM 5000 [USP'U]/ML
5000 INJECTION, SOLUTION INTRAVENOUS; SUBCUTANEOUS EVERY 12 HOURS
Status: CANCELLED | OUTPATIENT
Start: 2018-01-01

## 2018-01-01 RX ORDER — POLYETHYLENE GLYCOL 3350 17 G/17G
1 POWDER, FOR SOLUTION ORAL
Status: DISCONTINUED | OUTPATIENT
Start: 2018-01-01 | End: 2018-01-01

## 2018-01-01 RX ORDER — LEVETIRACETAM 500 MG/1
500 TABLET ORAL 2 TIMES DAILY
Status: DISCONTINUED | OUTPATIENT
Start: 2018-01-01 | End: 2018-01-01

## 2018-01-01 RX ORDER — LOSARTAN POTASSIUM 100 MG/1
100 TABLET ORAL DAILY
Status: ON HOLD | COMMUNITY
End: 2018-01-01

## 2018-01-01 RX ORDER — SCOLOPAMINE TRANSDERMAL SYSTEM 1 MG/1
1 PATCH, EXTENDED RELEASE TRANSDERMAL
Status: DISCONTINUED | OUTPATIENT
Start: 2018-01-01 | End: 2018-01-01 | Stop reason: HOSPADM

## 2018-01-01 RX ORDER — ONDANSETRON 2 MG/ML
8 INJECTION INTRAMUSCULAR; INTRAVENOUS EVERY 8 HOURS PRN
Status: DISCONTINUED | OUTPATIENT
Start: 2018-01-01 | End: 2018-01-01 | Stop reason: HOSPADM

## 2018-01-01 RX ORDER — ASPIRIN 81 MG/1
324 TABLET, CHEWABLE ORAL DAILY
Status: DISCONTINUED | OUTPATIENT
Start: 2018-01-01 | End: 2018-01-01

## 2018-01-01 RX ORDER — HEPARIN SODIUM 5000 [USP'U]/ML
5000 INJECTION, SOLUTION INTRAVENOUS; SUBCUTANEOUS EVERY 12 HOURS
Status: DISCONTINUED | OUTPATIENT
Start: 2018-01-01 | End: 2018-01-01

## 2018-01-01 RX ORDER — ASPIRIN 81 MG/1
81 TABLET, CHEWABLE ORAL DAILY
Status: DISCONTINUED | OUTPATIENT
Start: 2018-01-01 | End: 2018-01-01

## 2018-01-01 RX ORDER — HEPARIN SODIUM 5000 [USP'U]/ML
5000 INJECTION, SOLUTION INTRAVENOUS; SUBCUTANEOUS EVERY 8 HOURS
Status: DISCONTINUED | OUTPATIENT
Start: 2018-01-01 | End: 2018-01-01

## 2018-01-01 RX ORDER — METOPROLOL SUCCINATE 25 MG/1
25 TABLET, EXTENDED RELEASE ORAL
Status: DISCONTINUED | OUTPATIENT
Start: 2018-01-01 | End: 2018-01-01

## 2018-01-01 RX ORDER — AMOXICILLIN 250 MG
2 CAPSULE ORAL 2 TIMES DAILY
Status: DISCONTINUED | OUTPATIENT
Start: 2018-01-01 | End: 2018-01-01

## 2018-01-01 RX ORDER — POTASSIUM CHLORIDE 20 MEQ/1
40 TABLET, EXTENDED RELEASE ORAL ONCE
Status: COMPLETED | OUTPATIENT
Start: 2018-01-01 | End: 2018-01-01

## 2018-01-01 RX ORDER — LOSARTAN POTASSIUM 50 MG/1
100 TABLET ORAL
Status: DISCONTINUED | OUTPATIENT
Start: 2018-01-01 | End: 2018-01-01

## 2018-01-01 RX ORDER — LORATADINE 10 MG/1
10 TABLET ORAL DAILY
Status: DISCONTINUED | OUTPATIENT
Start: 2018-01-01 | End: 2018-01-01 | Stop reason: HOSPADM

## 2018-01-01 RX ORDER — ASPIRIN 81 MG/1
81 TABLET, CHEWABLE ORAL DAILY
Qty: 100 TAB | Refills: 2 | Status: ON HOLD | OUTPATIENT
Start: 2018-01-01 | End: 2018-01-01

## 2018-01-01 RX ORDER — METOPROLOL SUCCINATE 25 MG/1
25 TABLET, EXTENDED RELEASE ORAL DAILY
Qty: 30 TAB | Refills: 0 | Status: ON HOLD
Start: 2018-01-01 | End: 2018-01-01

## 2018-01-01 RX ORDER — AMOXICILLIN 250 MG
2 CAPSULE ORAL 2 TIMES DAILY
Status: DISCONTINUED | OUTPATIENT
Start: 2018-01-01 | End: 2018-01-01 | Stop reason: HOSPADM

## 2018-01-01 RX ORDER — BISACODYL 10 MG
10 SUPPOSITORY, RECTAL RECTAL
Status: DISCONTINUED | OUTPATIENT
Start: 2018-01-01 | End: 2018-01-01

## 2018-01-01 RX ORDER — HEPARIN SODIUM 5000 [USP'U]/ML
5000 INJECTION, SOLUTION INTRAVENOUS; SUBCUTANEOUS EVERY 12 HOURS
Status: DISCONTINUED | OUTPATIENT
Start: 2018-01-01 | End: 2018-01-01 | Stop reason: HOSPADM

## 2018-01-01 RX ORDER — FLUCONAZOLE 100 MG/1
100 TABLET ORAL DAILY
Status: COMPLETED | OUTPATIENT
Start: 2018-01-01 | End: 2018-01-01

## 2018-01-01 RX ORDER — ASPIRIN 81 MG/1
81 TABLET, CHEWABLE ORAL DAILY
Status: CANCELLED | OUTPATIENT
Start: 2018-01-01

## 2018-01-01 RX ORDER — ATORVASTATIN CALCIUM 40 MG/1
40 TABLET, FILM COATED ORAL DAILY
Status: DISCONTINUED | OUTPATIENT
Start: 2018-01-01 | End: 2018-01-01

## 2018-01-01 RX ORDER — LABETALOL HYDROCHLORIDE 5 MG/ML
10 INJECTION, SOLUTION INTRAVENOUS EVERY 4 HOURS PRN
Status: DISCONTINUED | OUTPATIENT
Start: 2018-01-01 | End: 2018-01-01 | Stop reason: HOSPADM

## 2018-01-01 RX ORDER — METOPROLOL SUCCINATE 25 MG/1
25 TABLET, EXTENDED RELEASE ORAL
Status: CANCELLED | OUTPATIENT
Start: 2018-01-01

## 2018-01-01 RX ORDER — IPRATROPIUM BROMIDE AND ALBUTEROL SULFATE 2.5; .5 MG/3ML; MG/3ML
SOLUTION RESPIRATORY (INHALATION)
Status: COMPLETED
Start: 2018-01-01 | End: 2018-01-01

## 2018-01-01 RX ORDER — OXYMETAZOLINE HYDROCHLORIDE 0.05 G/100ML
2 SPRAY NASAL
Status: DISPENSED | OUTPATIENT
Start: 2018-01-01 | End: 2018-01-01

## 2018-01-01 RX ORDER — METRONIDAZOLE 500 MG/1
500 TABLET ORAL EVERY 8 HOURS
Status: COMPLETED | OUTPATIENT
Start: 2018-01-01 | End: 2018-01-01

## 2018-01-01 RX ORDER — LORAZEPAM 2 MG/ML
1-3 INJECTION INTRAMUSCULAR
Status: DISCONTINUED | OUTPATIENT
Start: 2018-01-01 | End: 2018-01-01 | Stop reason: HOSPADM

## 2018-01-01 RX ORDER — ASPIRIN 81 MG/1
81 TABLET, CHEWABLE ORAL DAILY
Qty: 100 TAB | Status: ON HOLD
Start: 2018-01-01 | End: 2018-01-01

## 2018-01-01 RX ORDER — AMOXICILLIN 250 MG
2 CAPSULE ORAL 2 TIMES DAILY
Qty: 30 TAB | Refills: 0 | Status: ON HOLD
Start: 2018-01-01 | End: 2018-01-01

## 2018-01-01 RX ORDER — LORAZEPAM 2 MG/ML
1 CONCENTRATE ORAL
Status: DISCONTINUED | OUTPATIENT
Start: 2018-01-01 | End: 2018-01-01 | Stop reason: HOSPADM

## 2018-01-01 RX ORDER — METOPROLOL SUCCINATE 50 MG/1
50 TABLET, EXTENDED RELEASE ORAL DAILY
Status: ON HOLD | COMMUNITY
End: 2018-01-01

## 2018-01-01 RX ORDER — MORPHINE SULFATE 100 MG/5ML
10 SOLUTION ORAL
Status: DISCONTINUED | OUTPATIENT
Start: 2018-01-01 | End: 2018-01-01 | Stop reason: HOSPADM

## 2018-01-01 RX ORDER — LEVOTHYROXINE SODIUM 0.1 MG/1
100 TABLET ORAL
Qty: 30 TAB | Refills: 2 | Status: ON HOLD | OUTPATIENT
Start: 2018-01-01 | End: 2018-01-01

## 2018-01-01 RX ORDER — LEVOTHYROXINE SODIUM 0.1 MG/1
100 TABLET ORAL
Status: ON HOLD | COMMUNITY
End: 2018-01-01

## 2018-01-01 RX ORDER — ONDANSETRON 4 MG/1
4 TABLET, ORALLY DISINTEGRATING ORAL 4 TIMES DAILY PRN
Status: DISCONTINUED | OUTPATIENT
Start: 2018-01-01 | End: 2018-01-01 | Stop reason: HOSPADM

## 2018-01-01 RX ORDER — METOPROLOL SUCCINATE 25 MG/1
25 TABLET, EXTENDED RELEASE ORAL
Status: DISCONTINUED | OUTPATIENT
Start: 2018-01-01 | End: 2018-01-01 | Stop reason: HOSPADM

## 2018-01-01 RX ORDER — ACETAMINOPHEN 325 MG/1
650 TABLET ORAL EVERY 4 HOURS PRN
Status: DISCONTINUED | OUTPATIENT
Start: 2018-01-01 | End: 2018-01-01 | Stop reason: HOSPADM

## 2018-01-01 RX ORDER — POLYVINYL ALCOHOL 14 MG/ML
1 SOLUTION/ DROPS OPHTHALMIC PRN
Status: DISCONTINUED | OUTPATIENT
Start: 2018-01-01 | End: 2018-01-01 | Stop reason: HOSPADM

## 2018-01-01 RX ORDER — METOPROLOL SUCCINATE 25 MG/1
25 TABLET, EXTENDED RELEASE ORAL DAILY
Status: DISCONTINUED | OUTPATIENT
Start: 2018-01-01 | End: 2018-01-01 | Stop reason: HOSPADM

## 2018-01-01 RX ORDER — LOSARTAN POTASSIUM 50 MG/1
50 TABLET ORAL ONCE
Status: COMPLETED | OUTPATIENT
Start: 2018-01-01 | End: 2018-01-01

## 2018-01-01 RX ORDER — LEVOFLOXACIN 250 MG/1
500 TABLET, FILM COATED ORAL DAILY
Status: COMPLETED | OUTPATIENT
Start: 2018-01-01 | End: 2018-01-01

## 2018-01-01 RX ORDER — POLYVINYL ALCOHOL 14 MG/ML
2 SOLUTION/ DROPS OPHTHALMIC PRN
Status: DISCONTINUED | OUTPATIENT
Start: 2018-01-01 | End: 2018-01-01 | Stop reason: HOSPADM

## 2018-01-01 RX ORDER — SODIUM CHLORIDE, SODIUM LACTATE, POTASSIUM CHLORIDE, CALCIUM CHLORIDE 600; 310; 30; 20 MG/100ML; MG/100ML; MG/100ML; MG/100ML
INJECTION, SOLUTION INTRAVENOUS CONTINUOUS
Status: DISCONTINUED | OUTPATIENT
Start: 2018-01-01 | End: 2018-01-01

## 2018-01-01 RX ORDER — POLYETHYLENE GLYCOL 3350 17 G/17G
1 POWDER, FOR SOLUTION ORAL
Status: DISCONTINUED | OUTPATIENT
Start: 2018-01-01 | End: 2018-01-01 | Stop reason: HOSPADM

## 2018-01-01 RX ORDER — ASPIRIN 325 MG
325 TABLET ORAL DAILY
Status: DISCONTINUED | OUTPATIENT
Start: 2018-01-01 | End: 2018-01-01

## 2018-01-01 RX ORDER — HYDRALAZINE HYDROCHLORIDE 25 MG/1
25 TABLET, FILM COATED ORAL EVERY 8 HOURS PRN
Status: DISCONTINUED | OUTPATIENT
Start: 2018-01-01 | End: 2018-01-01 | Stop reason: HOSPADM

## 2018-01-01 RX ORDER — LEVETIRACETAM 100 MG/ML
500 SOLUTION ORAL EVERY 12 HOURS
Status: DISCONTINUED | OUTPATIENT
Start: 2018-01-01 | End: 2018-01-01

## 2018-01-01 RX ORDER — MORPHINE SULFATE 10 MG/ML
10 INJECTION, SOLUTION INTRAMUSCULAR; INTRAVENOUS
Status: DISCONTINUED | OUTPATIENT
Start: 2018-01-01 | End: 2018-01-01 | Stop reason: HOSPADM

## 2018-01-01 RX ORDER — LOSARTAN POTASSIUM 100 MG/1
100 TABLET ORAL DAILY
Qty: 30 TAB | Refills: 2 | Status: ON HOLD | OUTPATIENT
Start: 2018-01-01 | End: 2018-01-01

## 2018-01-01 RX ORDER — HYDRALAZINE HYDROCHLORIDE 20 MG/ML
10 INJECTION INTRAMUSCULAR; INTRAVENOUS
Status: DISCONTINUED | OUTPATIENT
Start: 2018-01-01 | End: 2018-01-01 | Stop reason: HOSPADM

## 2018-01-01 RX ORDER — ALUMINA, MAGNESIA, AND SIMETHICONE 2400; 2400; 240 MG/30ML; MG/30ML; MG/30ML
20 SUSPENSION ORAL
Status: DISCONTINUED | OUTPATIENT
Start: 2018-01-01 | End: 2018-01-01 | Stop reason: HOSPADM

## 2018-01-01 RX ORDER — SODIUM CHLORIDE AND POTASSIUM CHLORIDE 150; 900 MG/100ML; MG/100ML
INJECTION, SOLUTION INTRAVENOUS CONTINUOUS
Status: DISCONTINUED | OUTPATIENT
Start: 2018-01-01 | End: 2018-01-01

## 2018-01-01 RX ORDER — LOSARTAN POTASSIUM 25 MG/1
25 TABLET ORAL DAILY
Status: DISCONTINUED | OUTPATIENT
Start: 2018-01-01 | End: 2018-01-01 | Stop reason: HOSPADM

## 2018-01-01 RX ORDER — LEVOTHYROXINE SODIUM 0.05 MG/1
100 TABLET ORAL
Status: DISCONTINUED | OUTPATIENT
Start: 2018-01-01 | End: 2018-01-01 | Stop reason: HOSPADM

## 2018-01-01 RX ORDER — LEVOTHYROXINE SODIUM 0.1 MG/1
100 TABLET ORAL
Status: DISCONTINUED | OUTPATIENT
Start: 2018-01-01 | End: 2018-01-01

## 2018-01-01 RX ORDER — MIDAZOLAM HYDROCHLORIDE 1 MG/ML
1-2 INJECTION INTRAMUSCULAR; INTRAVENOUS
Status: DISCONTINUED | OUTPATIENT
Start: 2018-01-01 | End: 2018-01-01

## 2018-01-01 RX ORDER — MICONAZOLE NITRATE 20 MG/G
CREAM TOPICAL PRN
Status: DISCONTINUED | OUTPATIENT
Start: 2018-01-01 | End: 2018-01-01 | Stop reason: HOSPADM

## 2018-01-01 RX ORDER — ATORVASTATIN CALCIUM 40 MG/1
40 TABLET, FILM COATED ORAL NIGHTLY
Status: ON HOLD | COMMUNITY
End: 2018-01-01

## 2018-01-01 RX ORDER — POLYVINYL ALCOHOL 14 MG/ML
2 SOLUTION/ DROPS OPHTHALMIC EVERY 6 HOURS PRN
Status: DISCONTINUED | OUTPATIENT
Start: 2018-01-01 | End: 2018-01-01 | Stop reason: HOSPADM

## 2018-01-01 RX ORDER — TRAZODONE HYDROCHLORIDE 50 MG/1
50 TABLET ORAL
Status: DISCONTINUED | OUTPATIENT
Start: 2018-01-01 | End: 2018-01-01 | Stop reason: HOSPADM

## 2018-01-01 RX ORDER — ONDANSETRON 2 MG/ML
4 INJECTION INTRAMUSCULAR; INTRAVENOUS EVERY 4 HOURS PRN
Status: DISCONTINUED | OUTPATIENT
Start: 2018-01-01 | End: 2018-01-01 | Stop reason: HOSPADM

## 2018-01-01 RX ORDER — TRAMADOL HYDROCHLORIDE 50 MG/1
50 TABLET ORAL EVERY 4 HOURS PRN
Status: DISCONTINUED | OUTPATIENT
Start: 2018-01-01 | End: 2018-01-01 | Stop reason: HOSPADM

## 2018-01-01 RX ORDER — POTASSIUM CHLORIDE 7.45 MG/ML
10 INJECTION INTRAVENOUS
Status: COMPLETED | OUTPATIENT
Start: 2018-01-01 | End: 2018-01-01

## 2018-01-01 RX ORDER — ONDANSETRON 4 MG/1
4 TABLET, ORALLY DISINTEGRATING ORAL EVERY 4 HOURS PRN
Status: DISCONTINUED | OUTPATIENT
Start: 2018-01-01 | End: 2018-01-01 | Stop reason: HOSPADM

## 2018-01-01 RX ORDER — LORAZEPAM 2 MG/ML
1 INJECTION INTRAMUSCULAR ONCE
Status: COMPLETED | OUTPATIENT
Start: 2018-01-01 | End: 2018-01-01

## 2018-01-01 RX ORDER — METOPROLOL SUCCINATE 25 MG/1
25 TABLET, EXTENDED RELEASE ORAL DAILY
Qty: 30 TAB | Refills: 2 | Status: ON HOLD | OUTPATIENT
Start: 2018-01-01 | End: 2018-01-01

## 2018-01-01 RX ORDER — SODIUM CHLORIDE 9 MG/ML
INJECTION, SOLUTION INTRAVENOUS CONTINUOUS
Status: ACTIVE | OUTPATIENT
Start: 2018-01-01 | End: 2018-01-01

## 2018-01-01 RX ORDER — LABETALOL HYDROCHLORIDE 5 MG/ML
10 INJECTION, SOLUTION INTRAVENOUS EVERY 4 HOURS PRN
Status: DISCONTINUED | OUTPATIENT
Start: 2018-01-01 | End: 2018-01-01

## 2018-01-01 RX ORDER — ATORVASTATIN CALCIUM 40 MG/1
40 TABLET, FILM COATED ORAL EVERY EVENING
Status: CANCELLED | OUTPATIENT
Start: 2018-01-01

## 2018-01-01 RX ORDER — HYDRALAZINE HYDROCHLORIDE 20 MG/ML
10 INJECTION INTRAMUSCULAR; INTRAVENOUS EVERY 6 HOURS PRN
Status: DISCONTINUED | OUTPATIENT
Start: 2018-01-01 | End: 2018-01-01

## 2018-01-01 RX ORDER — GLYCOPYRROLATE 1 MG/1
1 TABLET ORAL 3 TIMES DAILY PRN
Status: DISCONTINUED | OUTPATIENT
Start: 2018-01-01 | End: 2018-01-01 | Stop reason: HOSPADM

## 2018-01-01 RX ORDER — LORAZEPAM 2 MG/ML
1 INJECTION INTRAMUSCULAR
Status: DISCONTINUED | OUTPATIENT
Start: 2018-01-01 | End: 2018-01-01 | Stop reason: HOSPADM

## 2018-01-01 RX ORDER — HALOPERIDOL 5 MG/ML
2-5 INJECTION INTRAMUSCULAR EVERY 4 HOURS PRN
Status: DISCONTINUED | OUTPATIENT
Start: 2018-01-01 | End: 2018-01-01

## 2018-01-01 RX ORDER — LOSARTAN POTASSIUM 25 MG/1
25 TABLET ORAL
Status: DISCONTINUED | OUTPATIENT
Start: 2018-01-01 | End: 2018-01-01

## 2018-01-01 RX ORDER — METOPROLOL SUCCINATE 25 MG/1
25 TABLET, EXTENDED RELEASE ORAL DAILY
Status: DISCONTINUED | OUTPATIENT
Start: 2018-01-01 | End: 2018-01-01

## 2018-01-01 RX ORDER — ONDANSETRON 2 MG/ML
4 INJECTION INTRAMUSCULAR; INTRAVENOUS EVERY 6 HOURS PRN
Status: DISCONTINUED | OUTPATIENT
Start: 2018-01-01 | End: 2018-01-01 | Stop reason: HOSPADM

## 2018-01-01 RX ORDER — MORPHINE SULFATE 100 MG/5ML
10-20 SOLUTION ORAL
Status: DISCONTINUED | OUTPATIENT
Start: 2018-01-01 | End: 2018-01-01 | Stop reason: HOSPADM

## 2018-01-01 RX ORDER — ONDANSETRON 4 MG/1
8 TABLET, ORALLY DISINTEGRATING ORAL EVERY 8 HOURS PRN
Status: DISCONTINUED | OUTPATIENT
Start: 2018-01-01 | End: 2018-01-01 | Stop reason: HOSPADM

## 2018-01-01 RX ADMIN — LOSARTAN POTASSIUM 25 MG: 25 TABLET, FILM COATED ORAL at 05:11

## 2018-01-01 RX ADMIN — METRONIDAZOLE 500 MG: 500 TABLET ORAL at 20:19

## 2018-01-01 RX ADMIN — HEPARIN SODIUM 5000 UNITS: 5000 INJECTION, SOLUTION INTRAVENOUS; SUBCUTANEOUS at 15:38

## 2018-01-01 RX ADMIN — NYSTATIN 500000 UNITS: 100000 SUSPENSION ORAL at 15:09

## 2018-01-01 RX ADMIN — HEPARIN SODIUM 5000 UNITS: 5000 INJECTION, SOLUTION INTRAVENOUS; SUBCUTANEOUS at 05:20

## 2018-01-01 RX ADMIN — LEVETIRACETAM 500 MG: 500 TABLET ORAL at 18:07

## 2018-01-01 RX ADMIN — HALOPERIDOL LACTATE 5 MG: 5 INJECTION, SOLUTION INTRAMUSCULAR at 01:13

## 2018-01-01 RX ADMIN — LORAZEPAM 1 MG: 2 INJECTION INTRAMUSCULAR; INTRAVENOUS at 13:09

## 2018-01-01 RX ADMIN — SCOPALAMINE 1 PATCH: 1 PATCH, EXTENDED RELEASE TRANSDERMAL at 17:32

## 2018-01-01 RX ADMIN — METOPROLOL TARTRATE 25 MG: 25 TABLET, FILM COATED ORAL at 05:49

## 2018-01-01 RX ADMIN — HEPARIN SODIUM 5000 UNITS: 5000 INJECTION, SOLUTION INTRAVENOUS; SUBCUTANEOUS at 09:15

## 2018-01-01 RX ADMIN — POTASSIUM CHLORIDE 20 MEQ: 1500 TABLET, EXTENDED RELEASE ORAL at 11:42

## 2018-01-01 RX ADMIN — HEPARIN SODIUM 5000 UNITS: 5000 INJECTION, SOLUTION INTRAVENOUS; SUBCUTANEOUS at 08:51

## 2018-01-01 RX ADMIN — NYSTATIN 500000 UNITS: 100000 SUSPENSION ORAL at 23:48

## 2018-01-01 RX ADMIN — HEPARIN SODIUM 5000 UNITS: 5000 INJECTION, SOLUTION INTRAVENOUS; SUBCUTANEOUS at 21:17

## 2018-01-01 RX ADMIN — LEVOTHYROXINE SODIUM 100 MCG: 50 TABLET ORAL at 05:11

## 2018-01-01 RX ADMIN — ASPIRIN 81 MG 81 MG: 81 TABLET ORAL at 08:41

## 2018-01-01 RX ADMIN — LORAZEPAM 1 MG: 2 INJECTION INTRAMUSCULAR; INTRAVENOUS at 10:00

## 2018-01-01 RX ADMIN — HEPARIN SODIUM 5000 UNITS: 5000 INJECTION, SOLUTION INTRAVENOUS; SUBCUTANEOUS at 10:33

## 2018-01-01 RX ADMIN — METRONIDAZOLE 500 MG: 500 TABLET ORAL at 14:22

## 2018-01-01 RX ADMIN — ATORVASTATIN CALCIUM 40 MG: 40 TABLET, FILM COATED ORAL at 17:13

## 2018-01-01 RX ADMIN — LOSARTAN POTASSIUM 25 MG: 25 TABLET, FILM COATED ORAL at 09:02

## 2018-01-01 RX ADMIN — LEVOFLOXACIN 500 MG: 250 TABLET, FILM COATED ORAL at 09:02

## 2018-01-01 RX ADMIN — ATORVASTATIN CALCIUM 40 MG: 40 TABLET, FILM COATED ORAL at 21:57

## 2018-01-01 RX ADMIN — LOSARTAN POTASSIUM 25 MG: 25 TABLET, FILM COATED ORAL at 08:24

## 2018-01-01 RX ADMIN — METRONIDAZOLE 500 MG: 500 TABLET ORAL at 21:17

## 2018-01-01 RX ADMIN — LORATADINE 10 MG: 10 TABLET ORAL at 08:24

## 2018-01-01 RX ADMIN — POTASSIUM CHLORIDE 40 MEQ: 1500 TABLET, EXTENDED RELEASE ORAL at 14:03

## 2018-01-01 RX ADMIN — SODIUM CHLORIDE: 9 INJECTION, SOLUTION INTRAVENOUS at 15:43

## 2018-01-01 RX ADMIN — ASPIRIN 81 MG 81 MG: 81 TABLET ORAL at 09:30

## 2018-01-01 RX ADMIN — LOSARTAN POTASSIUM 50 MG: 50 TABLET ORAL at 05:21

## 2018-01-01 RX ADMIN — ATORVASTATIN CALCIUM 40 MG: 40 TABLET, FILM COATED ORAL at 21:24

## 2018-01-01 RX ADMIN — LEVOTHYROXINE SODIUM 100 MCG: 50 TABLET ORAL at 05:32

## 2018-01-01 RX ADMIN — LEVOFLOXACIN 500 MG: 250 TABLET, FILM COATED ORAL at 08:31

## 2018-01-01 RX ADMIN — ASPIRIN 81 MG: 81 TABLET, CHEWABLE ORAL at 05:22

## 2018-01-01 RX ADMIN — HEPARIN SODIUM 5000 UNITS: 5000 INJECTION, SOLUTION INTRAVENOUS; SUBCUTANEOUS at 06:14

## 2018-01-01 RX ADMIN — LOSARTAN POTASSIUM 25 MG: 25 TABLET, FILM COATED ORAL at 08:12

## 2018-01-01 RX ADMIN — METRONIDAZOLE 500 MG: 500 TABLET ORAL at 22:00

## 2018-01-01 RX ADMIN — METRONIDAZOLE 500 MG: 500 TABLET ORAL at 05:22

## 2018-01-01 RX ADMIN — ATORVASTATIN CALCIUM 40 MG: 40 TABLET, FILM COATED ORAL at 17:32

## 2018-01-01 RX ADMIN — IOHEXOL 100 ML: 350 INJECTION, SOLUTION INTRAVENOUS at 17:47

## 2018-01-01 RX ADMIN — MORPHINE SULFATE 5 MG: 10 INJECTION INTRAVENOUS at 05:47

## 2018-01-01 RX ADMIN — ASPIRIN 81 MG 81 MG: 81 TABLET ORAL at 08:33

## 2018-01-01 RX ADMIN — LEVOTHYROXINE SODIUM 100 MCG: 50 TABLET ORAL at 05:29

## 2018-01-01 RX ADMIN — NYSTATIN 500000 UNITS: 100000 SUSPENSION ORAL at 17:44

## 2018-01-01 RX ADMIN — METOPROLOL SUCCINATE 25 MG: 25 TABLET, EXTENDED RELEASE ORAL at 04:51

## 2018-01-01 RX ADMIN — ASPIRIN 81 MG: 81 TABLET, CHEWABLE ORAL at 05:25

## 2018-01-01 RX ADMIN — Medication 400 MG: at 08:33

## 2018-01-01 RX ADMIN — NICOTINE 7 MG: 7 PATCH, EXTENDED RELEASE TRANSDERMAL at 06:00

## 2018-01-01 RX ADMIN — LOSARTAN POTASSIUM 50 MG: 50 TABLET ORAL at 04:51

## 2018-01-01 RX ADMIN — LEVOTHYROXINE SODIUM 100 MCG: 50 TABLET ORAL at 05:22

## 2018-01-01 RX ADMIN — ATORVASTATIN CALCIUM 40 MG: 40 TABLET, FILM COATED ORAL at 05:48

## 2018-01-01 RX ADMIN — STANDARDIZED SENNA CONCENTRATE AND DOCUSATE SODIUM 2 TABLET: 8.6; 5 TABLET, FILM COATED ORAL at 19:47

## 2018-01-01 RX ADMIN — LABETALOL HYDROCHLORIDE 10 MG: 5 INJECTION, SOLUTION INTRAVENOUS at 15:42

## 2018-01-01 RX ADMIN — HEPARIN SODIUM 5000 UNITS: 5000 INJECTION, SOLUTION INTRAVENOUS; SUBCUTANEOUS at 21:08

## 2018-01-01 RX ADMIN — LEVOTHYROXINE SODIUM 100 MCG: 100 TABLET ORAL at 04:51

## 2018-01-01 RX ADMIN — HEPARIN SODIUM 5000 UNITS: 5000 INJECTION, SOLUTION INTRAVENOUS; SUBCUTANEOUS at 11:35

## 2018-01-01 RX ADMIN — NICOTINE 7 MG: 7 PATCH, EXTENDED RELEASE TRANSDERMAL at 05:11

## 2018-01-01 RX ADMIN — MORPHINE SULFATE 5 MG: 10 INJECTION INTRAVENOUS at 13:20

## 2018-01-01 RX ADMIN — FLUCONAZOLE 100 MG: 100 TABLET ORAL at 08:41

## 2018-01-01 RX ADMIN — METOPROLOL SUCCINATE 25 MG: 25 TABLET, EXTENDED RELEASE ORAL at 09:02

## 2018-01-01 RX ADMIN — HEPARIN SODIUM 5000 UNITS: 5000 INJECTION, SOLUTION INTRAVENOUS; SUBCUTANEOUS at 09:41

## 2018-01-01 RX ADMIN — HEPARIN SODIUM 5000 UNITS: 5000 INJECTION, SOLUTION INTRAVENOUS; SUBCUTANEOUS at 19:48

## 2018-01-01 RX ADMIN — FLUCONAZOLE 100 MG: 100 TABLET ORAL at 08:33

## 2018-01-01 RX ADMIN — SODIUM CHLORIDE 500 MG: 9 INJECTION, SOLUTION INTRAVENOUS at 12:41

## 2018-01-01 RX ADMIN — LEVETIRACETAM 500 MG: 500 TABLET ORAL at 05:21

## 2018-01-01 RX ADMIN — METOPROLOL SUCCINATE 25 MG: 25 TABLET, EXTENDED RELEASE ORAL at 08:12

## 2018-01-01 RX ADMIN — METRONIDAZOLE 500 MG: 500 TABLET ORAL at 14:41

## 2018-01-01 RX ADMIN — METOPROLOL SUCCINATE 25 MG: 25 TABLET, EXTENDED RELEASE ORAL at 08:33

## 2018-01-01 RX ADMIN — POTASSIUM CHLORIDE 10 MEQ: 10 INJECTION, SOLUTION INTRAVENOUS at 10:15

## 2018-01-01 RX ADMIN — NICOTINE 7 MG: 7 PATCH, EXTENDED RELEASE TRANSDERMAL at 05:30

## 2018-01-01 RX ADMIN — IPRATROPIUM BROMIDE AND ALBUTEROL SULFATE 3 ML: .5; 3 SOLUTION RESPIRATORY (INHALATION) at 22:20

## 2018-01-01 RX ADMIN — LOSARTAN POTASSIUM 50 MG: 50 TABLET ORAL at 15:28

## 2018-01-01 RX ADMIN — ASPIRIN 81 MG 81 MG: 81 TABLET ORAL at 08:50

## 2018-01-01 RX ADMIN — HEPARIN SODIUM 5000 UNITS: 5000 INJECTION, SOLUTION INTRAVENOUS; SUBCUTANEOUS at 13:34

## 2018-01-01 RX ADMIN — SODIUM CHLORIDE, POTASSIUM CHLORIDE, SODIUM LACTATE AND CALCIUM CHLORIDE: 600; 310; 30; 20 INJECTION, SOLUTION INTRAVENOUS at 22:27

## 2018-01-01 RX ADMIN — LORATADINE 10 MG: 10 TABLET ORAL at 08:41

## 2018-01-01 RX ADMIN — LABETALOL HYDROCHLORIDE 10 MG: 5 INJECTION, SOLUTION INTRAVENOUS at 05:30

## 2018-01-01 RX ADMIN — METOPROLOL TARTRATE 25 MG: 25 TABLET, FILM COATED ORAL at 18:07

## 2018-01-01 RX ADMIN — LEVOTHYROXINE SODIUM 100 MCG: 50 TABLET ORAL at 06:07

## 2018-01-01 RX ADMIN — ASPIRIN 81 MG: 81 TABLET, CHEWABLE ORAL at 05:20

## 2018-01-01 RX ADMIN — STANDARDIZED SENNA CONCENTRATE AND DOCUSATE SODIUM 2 TABLET: 8.6; 5 TABLET, FILM COATED ORAL at 21:57

## 2018-01-01 RX ADMIN — LEVOTHYROXINE SODIUM 100 MCG: 100 TABLET ORAL at 05:49

## 2018-01-01 RX ADMIN — IPRATROPIUM BROMIDE AND ALBUTEROL SULFATE 3 ML: .5; 3 SOLUTION RESPIRATORY (INHALATION) at 23:23

## 2018-01-01 RX ADMIN — Medication 400 MG: at 08:12

## 2018-01-01 RX ADMIN — LOSARTAN POTASSIUM 25 MG: 25 TABLET, FILM COATED ORAL at 09:29

## 2018-01-01 RX ADMIN — CHOLESTYRAMINE: 4 POWDER, FOR SUSPENSION ORAL at 16:55

## 2018-01-01 RX ADMIN — ASPIRIN 81 MG 81 MG: 81 TABLET ORAL at 09:02

## 2018-01-01 RX ADMIN — STANDARDIZED SENNA CONCENTRATE AND DOCUSATE SODIUM 2 TABLET: 8.6; 5 TABLET, FILM COATED ORAL at 09:02

## 2018-01-01 RX ADMIN — HEPARIN SODIUM 5000 UNITS: 5000 INJECTION, SOLUTION INTRAVENOUS; SUBCUTANEOUS at 21:57

## 2018-01-01 RX ADMIN — LORAZEPAM 1 MG: 2 SOLUTION, CONCENTRATE ORAL at 10:55

## 2018-01-01 RX ADMIN — HEPARIN SODIUM 5000 UNITS: 5000 INJECTION, SOLUTION INTRAVENOUS; SUBCUTANEOUS at 22:26

## 2018-01-01 RX ADMIN — NYSTATIN 500000 UNITS: 100000 SUSPENSION ORAL at 09:19

## 2018-01-01 RX ADMIN — LOSARTAN POTASSIUM 25 MG: 25 TABLET, FILM COATED ORAL at 09:15

## 2018-01-01 RX ADMIN — ATORVASTATIN CALCIUM 40 MG: 40 TABLET, FILM COATED ORAL at 19:54

## 2018-01-01 RX ADMIN — MAGNESIUM GLUCONATE 500 MG ORAL TABLET 400 MG: 500 TABLET ORAL at 06:00

## 2018-01-01 RX ADMIN — HEPARIN SODIUM 5000 UNITS: 5000 INJECTION, SOLUTION INTRAVENOUS; SUBCUTANEOUS at 20:47

## 2018-01-01 RX ADMIN — NICOTINE 7 MG: 7 PATCH, EXTENDED RELEASE TRANSDERMAL at 06:14

## 2018-01-01 RX ADMIN — HEPARIN SODIUM 5000 UNITS: 5000 INJECTION, SOLUTION INTRAVENOUS; SUBCUTANEOUS at 05:49

## 2018-01-01 RX ADMIN — LEVETIRACETAM 500 MG: 100 SOLUTION ORAL at 05:27

## 2018-01-01 RX ADMIN — METOPROLOL TARTRATE 25 MG: 25 TABLET, FILM COATED ORAL at 05:21

## 2018-01-01 RX ADMIN — POTASSIUM BICARBONATE 25 MEQ: 25 TABLET, EFFERVESCENT ORAL at 05:49

## 2018-01-01 RX ADMIN — CEFTRIAXONE SODIUM 1 G: 1 INJECTION, POWDER, FOR SOLUTION INTRAMUSCULAR; INTRAVENOUS at 16:02

## 2018-01-01 RX ADMIN — METRONIDAZOLE 500 MG: 500 TABLET ORAL at 21:19

## 2018-01-01 RX ADMIN — NYSTATIN 500000 UNITS: 100000 SUSPENSION ORAL at 13:59

## 2018-01-01 RX ADMIN — HEPARIN SODIUM 5000 UNITS: 5000 INJECTION, SOLUTION INTRAVENOUS; SUBCUTANEOUS at 20:02

## 2018-01-01 RX ADMIN — METOPROLOL SUCCINATE 25 MG: 25 TABLET, EXTENDED RELEASE ORAL at 17:32

## 2018-01-01 RX ADMIN — ASPIRIN 81 MG: 81 TABLET, CHEWABLE ORAL at 14:01

## 2018-01-01 RX ADMIN — LEVOTHYROXINE SODIUM 100 MCG: 50 TABLET ORAL at 05:12

## 2018-01-01 RX ADMIN — POTASSIUM CHLORIDE 10 MEQ: 10 INJECTION, SOLUTION INTRAVENOUS at 09:15

## 2018-01-01 RX ADMIN — HEPARIN SODIUM 5000 UNITS: 5000 INJECTION, SOLUTION INTRAVENOUS; SUBCUTANEOUS at 09:02

## 2018-01-01 RX ADMIN — MORPHINE SULFATE 10 MG: 100 SOLUTION ORAL at 07:45

## 2018-01-01 RX ADMIN — ATORVASTATIN CALCIUM 40 MG: 40 TABLET, FILM COATED ORAL at 05:22

## 2018-01-01 RX ADMIN — ATORVASTATIN CALCIUM 40 MG: 40 TABLET, FILM COATED ORAL at 05:21

## 2018-01-01 RX ADMIN — METRONIDAZOLE 500 MG: 500 TABLET ORAL at 05:08

## 2018-01-01 RX ADMIN — ASPIRIN 300 MG: 300 SUPPOSITORY RECTAL at 05:23

## 2018-01-01 RX ADMIN — LEVOFLOXACIN 500 MG: 250 TABLET, FILM COATED ORAL at 10:33

## 2018-01-01 RX ADMIN — ATORVASTATIN CALCIUM 40 MG: 40 TABLET, FILM COATED ORAL at 21:20

## 2018-01-01 RX ADMIN — HEPARIN SODIUM 5000 UNITS: 5000 INJECTION, SOLUTION INTRAVENOUS; SUBCUTANEOUS at 14:00

## 2018-01-01 RX ADMIN — ATORVASTATIN CALCIUM 40 MG: 40 TABLET, FILM COATED ORAL at 20:47

## 2018-01-01 RX ADMIN — METRONIDAZOLE 500 MG: 500 TABLET ORAL at 20:07

## 2018-01-01 RX ADMIN — POTASSIUM CHLORIDE AND SODIUM CHLORIDE: 900; 150 INJECTION, SOLUTION INTRAVENOUS at 11:49

## 2018-01-01 RX ADMIN — POTASSIUM BICARBONATE 25 MEQ: 25 TABLET, EFFERVESCENT ORAL at 15:29

## 2018-01-01 RX ADMIN — LOSARTAN POTASSIUM 25 MG: 25 TABLET, FILM COATED ORAL at 08:41

## 2018-01-01 RX ADMIN — HEPARIN SODIUM 5000 UNITS: 5000 INJECTION, SOLUTION INTRAVENOUS; SUBCUTANEOUS at 21:19

## 2018-01-01 RX ADMIN — LEVETIRACETAM 500 MG: 500 TABLET ORAL at 05:49

## 2018-01-01 RX ADMIN — NYSTATIN 500000 UNITS: 100000 SUSPENSION ORAL at 11:37

## 2018-01-01 RX ADMIN — POTASSIUM CHLORIDE 10 MEQ: 10 INJECTION, SOLUTION INTRAVENOUS at 08:24

## 2018-01-01 RX ADMIN — HYDRALAZINE HYDROCHLORIDE 10 MG: 20 INJECTION INTRAMUSCULAR; INTRAVENOUS at 23:38

## 2018-01-01 RX ADMIN — CEFTRIAXONE SODIUM 1 G: 1 INJECTION, POWDER, FOR SOLUTION INTRAMUSCULAR; INTRAVENOUS at 05:27

## 2018-01-01 RX ADMIN — NYSTATIN 500000 UNITS: 100000 SUSPENSION ORAL at 21:22

## 2018-01-01 RX ADMIN — LOSARTAN POTASSIUM 100 MG: 50 TABLET ORAL at 05:49

## 2018-01-01 RX ADMIN — NICOTINE 7 MG: 7 PATCH, EXTENDED RELEASE TRANSDERMAL at 05:12

## 2018-01-01 RX ADMIN — HEPARIN SODIUM 5000 UNITS: 5000 INJECTION, SOLUTION INTRAVENOUS; SUBCUTANEOUS at 20:19

## 2018-01-01 RX ADMIN — NYSTATIN 500000 UNITS: 100000 SUSPENSION ORAL at 18:08

## 2018-01-01 RX ADMIN — LOSARTAN POTASSIUM 25 MG: 25 TABLET, FILM COATED ORAL at 10:34

## 2018-01-01 RX ADMIN — Medication 400 MG: at 09:29

## 2018-01-01 RX ADMIN — ASPIRIN 300 MG: 300 SUPPOSITORY RECTAL at 11:36

## 2018-01-01 RX ADMIN — HEPARIN SODIUM 5000 UNITS: 5000 INJECTION, SOLUTION INTRAVENOUS; SUBCUTANEOUS at 21:22

## 2018-01-01 RX ADMIN — METOPROLOL SUCCINATE 25 MG: 25 TABLET, EXTENDED RELEASE ORAL at 09:15

## 2018-01-01 RX ADMIN — MORPHINE SULFATE 5 MG: 10 INJECTION INTRAVENOUS at 11:27

## 2018-01-01 RX ADMIN — HEPARIN SODIUM 5000 UNITS: 5000 INJECTION, SOLUTION INTRAVENOUS; SUBCUTANEOUS at 08:33

## 2018-01-01 RX ADMIN — LORATADINE 10 MG: 10 TABLET ORAL at 08:50

## 2018-01-01 RX ADMIN — NICOTINE 7 MG: 7 PATCH, EXTENDED RELEASE TRANSDERMAL at 05:08

## 2018-01-01 RX ADMIN — MAGNESIUM GLUCONATE 500 MG ORAL TABLET 400 MG: 500 TABLET ORAL at 05:21

## 2018-01-01 RX ADMIN — FLUCONAZOLE 100 MG: 100 TABLET ORAL at 09:30

## 2018-01-01 RX ADMIN — ASPIRIN 81 MG 81 MG: 81 TABLET ORAL at 08:12

## 2018-01-01 RX ADMIN — LEVOTHYROXINE SODIUM 100 MCG: 50 TABLET ORAL at 04:50

## 2018-01-01 RX ADMIN — LEVETIRACETAM 500 MG: 100 SOLUTION ORAL at 23:38

## 2018-01-01 RX ADMIN — METOPROLOL SUCCINATE 25 MG: 25 TABLET, EXTENDED RELEASE ORAL at 08:50

## 2018-01-01 RX ADMIN — FLUCONAZOLE 100 MG: 100 TABLET ORAL at 08:12

## 2018-01-01 RX ADMIN — LEVOTHYROXINE SODIUM 100 MCG: 100 TABLET ORAL at 05:20

## 2018-01-01 RX ADMIN — LOSARTAN POTASSIUM 25 MG: 25 TABLET, FILM COATED ORAL at 08:33

## 2018-01-01 RX ADMIN — HEPARIN SODIUM 5000 UNITS: 5000 INJECTION, SOLUTION INTRAVENOUS; SUBCUTANEOUS at 04:50

## 2018-01-01 RX ADMIN — SODIUM CHLORIDE, POTASSIUM CHLORIDE, SODIUM LACTATE AND CALCIUM CHLORIDE: 600; 310; 30; 20 INJECTION, SOLUTION INTRAVENOUS at 05:31

## 2018-01-01 RX ADMIN — ATORVASTATIN CALCIUM 40 MG: 40 TABLET, FILM COATED ORAL at 19:48

## 2018-01-01 RX ADMIN — Medication 400 MG: at 08:24

## 2018-01-01 RX ADMIN — LOSARTAN POTASSIUM 25 MG: 25 TABLET, FILM COATED ORAL at 08:49

## 2018-01-01 RX ADMIN — METOPROLOL SUCCINATE 25 MG: 25 TABLET, EXTENDED RELEASE ORAL at 08:41

## 2018-01-01 RX ADMIN — HEPARIN SODIUM 5000 UNITS: 5000 INJECTION, SOLUTION INTRAVENOUS; SUBCUTANEOUS at 22:50

## 2018-01-01 RX ADMIN — HEPARIN SODIUM 5000 UNITS: 5000 INJECTION, SOLUTION INTRAVENOUS; SUBCUTANEOUS at 08:31

## 2018-01-01 RX ADMIN — SODIUM CHLORIDE 500 MG: 9 INJECTION, SOLUTION INTRAVENOUS at 18:01

## 2018-01-01 RX ADMIN — METOPROLOL SUCCINATE 25 MG: 25 TABLET, EXTENDED RELEASE ORAL at 08:51

## 2018-01-01 RX ADMIN — LOSARTAN POTASSIUM 25 MG: 25 TABLET, FILM COATED ORAL at 17:38

## 2018-01-01 RX ADMIN — ATORVASTATIN CALCIUM 40 MG: 40 TABLET, FILM COATED ORAL at 21:17

## 2018-01-01 RX ADMIN — FLUCONAZOLE 100 MG: 100 TABLET ORAL at 08:24

## 2018-01-01 RX ADMIN — SCOPALAMINE 1 PATCH: 1 PATCH, EXTENDED RELEASE TRANSDERMAL at 20:10

## 2018-01-01 RX ADMIN — METOPROLOL SUCCINATE 25 MG: 25 TABLET, EXTENDED RELEASE ORAL at 08:40

## 2018-01-01 RX ADMIN — POTASSIUM CHLORIDE: 2 INJECTION, SOLUTION, CONCENTRATE INTRAVENOUS at 09:18

## 2018-01-01 RX ADMIN — POTASSIUM CHLORIDE AND SODIUM CHLORIDE: 900; 150 INJECTION, SOLUTION INTRAVENOUS at 05:27

## 2018-01-01 RX ADMIN — LEVOTHYROXINE SODIUM 100 MCG: 50 TABLET ORAL at 05:41

## 2018-01-01 RX ADMIN — MORPHINE SULFATE 5 MG: 10 INJECTION INTRAVENOUS at 20:35

## 2018-01-01 RX ADMIN — METOPROLOL SUCCINATE 25 MG: 25 TABLET, EXTENDED RELEASE ORAL at 10:33

## 2018-01-01 RX ADMIN — LEVOFLOXACIN 500 MG: 250 TABLET, FILM COATED ORAL at 09:14

## 2018-01-01 RX ADMIN — NICOTINE 7 MG: 7 PATCH, EXTENDED RELEASE TRANSDERMAL at 05:00

## 2018-01-01 RX ADMIN — MORPHINE SULFATE 5 MG: 10 INJECTION INTRAVENOUS at 15:41

## 2018-01-01 RX ADMIN — POTASSIUM CHLORIDE AND SODIUM CHLORIDE: 900; 150 INJECTION, SOLUTION INTRAVENOUS at 15:38

## 2018-01-01 RX ADMIN — NICOTINE 7 MG: 7 PATCH, EXTENDED RELEASE TRANSDERMAL at 05:29

## 2018-01-01 RX ADMIN — HEPARIN SODIUM 5000 UNITS: 5000 INJECTION, SOLUTION INTRAVENOUS; SUBCUTANEOUS at 05:22

## 2018-01-01 RX ADMIN — NICOTINE 7 MG: 7 PATCH, EXTENDED RELEASE TRANSDERMAL at 06:08

## 2018-01-01 RX ADMIN — NICOTINE 7 MG: 7 PATCH, EXTENDED RELEASE TRANSDERMAL at 05:22

## 2018-01-01 RX ADMIN — METOPROLOL SUCCINATE 25 MG: 25 TABLET, EXTENDED RELEASE ORAL at 06:14

## 2018-01-01 RX ADMIN — HEPARIN SODIUM 5000 UNITS: 5000 INJECTION, SOLUTION INTRAVENOUS; SUBCUTANEOUS at 17:33

## 2018-01-01 RX ADMIN — METRONIDAZOLE 500 MG: 500 TABLET ORAL at 15:18

## 2018-01-01 RX ADMIN — LEVOFLOXACIN 500 MG: 250 TABLET, FILM COATED ORAL at 11:45

## 2018-01-01 RX ADMIN — METOPROLOL SUCCINATE 25 MG: 25 TABLET, EXTENDED RELEASE ORAL at 09:30

## 2018-01-01 RX ADMIN — METRONIDAZOLE 500 MG: 500 TABLET ORAL at 14:27

## 2018-01-01 RX ADMIN — HEPARIN SODIUM 5000 UNITS: 5000 INJECTION, SOLUTION INTRAVENOUS; SUBCUTANEOUS at 09:29

## 2018-01-01 RX ADMIN — METRONIDAZOLE 500 MG: 500 TABLET ORAL at 04:50

## 2018-01-01 RX ADMIN — SODIUM CHLORIDE, POTASSIUM CHLORIDE, SODIUM LACTATE AND CALCIUM CHLORIDE: 600; 310; 30; 20 INJECTION, SOLUTION INTRAVENOUS at 15:03

## 2018-01-01 RX ADMIN — ASPIRIN 81 MG 81 MG: 81 TABLET ORAL at 10:33

## 2018-01-01 RX ADMIN — ATORVASTATIN CALCIUM 40 MG: 40 TABLET, FILM COATED ORAL at 20:07

## 2018-01-01 RX ADMIN — ATORVASTATIN CALCIUM 40 MG: 40 TABLET, FILM COATED ORAL at 20:19

## 2018-01-01 RX ADMIN — ASPIRIN 81 MG 81 MG: 81 TABLET ORAL at 09:14

## 2018-01-01 RX ADMIN — NICOTINE 7 MG: 7 PATCH, EXTENDED RELEASE TRANSDERMAL at 04:50

## 2018-01-01 RX ADMIN — HEPARIN SODIUM 5000 UNITS: 5000 INJECTION, SOLUTION INTRAVENOUS; SUBCUTANEOUS at 15:10

## 2018-01-01 RX ADMIN — Medication 2 TABLET: at 06:15

## 2018-01-01 RX ADMIN — SODIUM CHLORIDE: 9 INJECTION, SOLUTION INTRAVENOUS at 21:05

## 2018-01-01 RX ADMIN — ASPIRIN 81 MG 81 MG: 81 TABLET ORAL at 08:31

## 2018-01-01 RX ADMIN — ATORVASTATIN CALCIUM 40 MG: 40 TABLET, FILM COATED ORAL at 20:00

## 2018-01-01 RX ADMIN — LORATADINE 10 MG: 10 TABLET ORAL at 13:51

## 2018-01-01 RX ADMIN — HEPARIN SODIUM 5000 UNITS: 5000 INJECTION, SOLUTION INTRAVENOUS; SUBCUTANEOUS at 20:07

## 2018-01-01 RX ADMIN — Medication 400 MG: at 08:41

## 2018-01-01 RX ADMIN — LEVETIRACETAM 500 MG: 500 TABLET ORAL at 17:44

## 2018-01-01 RX ADMIN — LORAZEPAM 1 MG: 2 INJECTION INTRAMUSCULAR; INTRAVENOUS at 16:28

## 2018-01-01 RX ADMIN — NICOTINE 7 MG: 7 PATCH, EXTENDED RELEASE TRANSDERMAL at 05:20

## 2018-01-01 RX ADMIN — NICOTINE 7 MG: 7 PATCH, EXTENDED RELEASE TRANSDERMAL at 05:32

## 2018-01-01 RX ADMIN — NYSTATIN 500000 UNITS: 100000 SUSPENSION ORAL at 13:34

## 2018-01-01 RX ADMIN — NICOTINE 7 MG: 7 PATCH, EXTENDED RELEASE TRANSDERMAL at 05:25

## 2018-01-01 RX ADMIN — PNEUMOCOCCAL 13-VALENT CONJUGATE VACCINE 0.5 ML: 2.2; 2.2; 2.2; 2.2; 2.2; 4.4; 2.2; 2.2; 2.2; 2.2; 2.2; 2.2; 2.2 INJECTION, SUSPENSION INTRAMUSCULAR at 05:27

## 2018-01-01 RX ADMIN — LEVOTHYROXINE SODIUM 100 MCG: 50 TABLET ORAL at 05:08

## 2018-01-01 RX ADMIN — METRONIDAZOLE 500 MG: 500 TABLET ORAL at 19:48

## 2018-01-01 RX ADMIN — GLYCOPYRROLATE 0.2 MG: 0.2 INJECTION INTRAMUSCULAR; INTRAVENOUS at 20:32

## 2018-01-01 RX ADMIN — METOPROLOL SUCCINATE 25 MG: 25 TABLET, EXTENDED RELEASE ORAL at 08:24

## 2018-01-01 RX ADMIN — NICOTINE 7 MG: 7 PATCH, EXTENDED RELEASE TRANSDERMAL at 05:42

## 2018-01-01 RX ADMIN — LEVOTHYROXINE SODIUM 100 MCG: 50 TABLET ORAL at 05:00

## 2018-01-01 RX ADMIN — METOPROLOL SUCCINATE 25 MG: 25 TABLET, EXTENDED RELEASE ORAL at 08:31

## 2018-01-01 RX ADMIN — IPRATROPIUM BROMIDE AND ALBUTEROL SULFATE 3 ML: .5; 3 SOLUTION RESPIRATORY (INHALATION) at 21:53

## 2018-01-01 RX ADMIN — ATORVASTATIN CALCIUM 40 MG: 40 TABLET, FILM COATED ORAL at 21:19

## 2018-01-01 RX ADMIN — LOSARTAN POTASSIUM 25 MG: 25 TABLET, FILM COATED ORAL at 08:31

## 2018-01-01 RX ADMIN — TRAZODONE HYDROCHLORIDE 50 MG: 50 TABLET ORAL at 21:49

## 2018-01-01 RX ADMIN — CEFTRIAXONE SODIUM 1 G: 1 INJECTION, POWDER, FOR SOLUTION INTRAMUSCULAR; INTRAVENOUS at 23:03

## 2018-01-01 RX ADMIN — ASPIRIN 81 MG 81 MG: 81 TABLET ORAL at 08:24

## 2018-01-01 RX ADMIN — CEFTRIAXONE SODIUM 1 G: 1 INJECTION, POWDER, FOR SOLUTION INTRAMUSCULAR; INTRAVENOUS at 05:31

## 2018-01-01 RX ADMIN — STANDARDIZED SENNA CONCENTRATE AND DOCUSATE SODIUM 2 TABLET: 8.6; 5 TABLET, FILM COATED ORAL at 20:07

## 2018-01-01 RX ADMIN — LEVOTHYROXINE SODIUM 100 MCG: 100 TABLET ORAL at 05:21

## 2018-01-01 RX ADMIN — MIDAZOLAM 1 MG: 1 INJECTION INTRAMUSCULAR; INTRAVENOUS at 10:05

## 2018-01-01 RX ADMIN — INFLUENZA A VIRUS A/MICHIGAN/45/2015 X-275 (H1N1) ANTIGEN (FORMALDEHYDE INACTIVATED), INFLUENZA A VIRUS A/SINGAPORE/INFIMH-16-0019/2016 IVR-186 (H3N2) ANTIGEN (FORMALDEHYDE INACTIVATED), AND INFLUENZA B VIRUS B/MARYLAND/15/2016 BX-69A (A B/COLORADO/6/2017-LIKE VIRUS) ANTIGEN (FORMALDEHYDE INACTIVATED) 0.5 ML: 60; 60; 60 INJECTION, SUSPENSION INTRAMUSCULAR at 05:29

## 2018-01-01 RX ADMIN — ASPIRIN 81 MG: 81 TABLET, CHEWABLE ORAL at 06:14

## 2018-01-01 RX ADMIN — NICOTINE 7 MG: 7 PATCH, EXTENDED RELEASE TRANSDERMAL at 15:43

## 2018-01-01 RX ADMIN — LOSARTAN POTASSIUM 50 MG: 50 TABLET ORAL at 05:20

## 2018-01-01 RX ADMIN — HEPARIN SODIUM 5000 UNITS: 5000 INJECTION, SOLUTION INTRAVENOUS; SUBCUTANEOUS at 13:42

## 2018-01-01 RX ADMIN — HEPARIN SODIUM 5000 UNITS: 5000 INJECTION, SOLUTION INTRAVENOUS; SUBCUTANEOUS at 05:23

## 2018-01-01 RX ADMIN — MORPHINE SULFATE 10 MG: 100 SOLUTION ORAL at 09:54

## 2018-01-01 RX ADMIN — GLYCOPYRROLATE 0.2 MG: 0.2 INJECTION INTRAMUSCULAR; INTRAVENOUS at 05:47

## 2018-01-01 RX ADMIN — MAGNESIUM GLUCONATE 500 MG ORAL TABLET 200 MG: 500 TABLET ORAL at 04:50

## 2018-01-01 RX ADMIN — SODIUM CHLORIDE, POTASSIUM CHLORIDE, SODIUM LACTATE AND CALCIUM CHLORIDE: 600; 310; 30; 20 INJECTION, SOLUTION INTRAVENOUS at 22:50

## 2018-01-01 RX ADMIN — LEVETIRACETAM 500 MG: 100 SOLUTION ORAL at 05:20

## 2018-01-01 RX ADMIN — ATORVASTATIN CALCIUM 40 MG: 40 TABLET, FILM COATED ORAL at 04:51

## 2018-01-01 RX ADMIN — HEPARIN SODIUM 5000 UNITS: 5000 INJECTION, SOLUTION INTRAVENOUS; SUBCUTANEOUS at 17:13

## 2018-01-01 RX ADMIN — ATORVASTATIN CALCIUM 40 MG: 40 TABLET, FILM COATED ORAL at 20:02

## 2018-01-01 RX ADMIN — HYDRALAZINE HYDROCHLORIDE 10 MG: 20 INJECTION INTRAMUSCULAR; INTRAVENOUS at 06:44

## 2018-01-01 RX ADMIN — SODIUM CHLORIDE 500 MG: 9 INJECTION, SOLUTION INTRAVENOUS at 05:30

## 2018-01-01 RX ADMIN — ASPIRIN 81 MG: 81 TABLET, CHEWABLE ORAL at 05:48

## 2018-01-01 RX ADMIN — LORAZEPAM 1 MG: 2 INJECTION INTRAMUSCULAR; INTRAVENOUS at 12:05

## 2018-01-01 RX ADMIN — METRONIDAZOLE 500 MG: 500 TABLET ORAL at 05:00

## 2018-01-01 RX ADMIN — HEPARIN SODIUM 5000 UNITS: 5000 INJECTION, SOLUTION INTRAVENOUS; SUBCUTANEOUS at 06:00

## 2018-01-01 RX ADMIN — ASPIRIN 81 MG: 81 TABLET, CHEWABLE ORAL at 04:51

## 2018-01-01 SDOH — ECONOMIC STABILITY: GENERAL

## 2018-01-01 SDOH — ECONOMIC STABILITY: HOUSING INSECURITY: UNSAFE COOKING RANGE AREA: 0

## 2018-01-01 SDOH — ECONOMIC STABILITY: HOUSING INSECURITY: UNSAFE APPLIANCES: 0

## 2018-01-01 ASSESSMENT — CHA2DS2 SCORE
PRIOR STROKE OR TIA OR THROMBOEMBOLISM: YES
AGE 75 OR GREATER: YES
HYPERTENSION: YES
SEX: FEMALE
AGE 65 TO 74: NO
VASCULAR DISEASE: NO
CHA2DS2 VASC SCORE: 6
DIABETES: NO
CHF OR LEFT VENTRICULAR DYSFUNCTION: NO

## 2018-01-01 ASSESSMENT — COGNITIVE AND FUNCTIONAL STATUS - GENERAL
TOILETING: TOTAL
EATING MEALS: TOTAL
PERSONAL GROOMING: TOTAL
SUGGESTED CMS G CODE MODIFIER DAILY ACTIVITY: CK
DRESSING REGULAR LOWER BODY CLOTHING: TOTAL
HELP NEEDED FOR BATHING: A LITTLE
SUGGESTED CMS G CODE MODIFIER DAILY ACTIVITY: CN
CLIMB 3 TO 5 STEPS WITH RAILING: TOTAL
TOILETING: TOTAL
MOVING FROM LYING ON BACK TO SITTING ON SIDE OF FLAT BED: UNABLE
STANDING UP FROM CHAIR USING ARMS: TOTAL
MOBILITY SCORE: 6
TURNING FROM BACK TO SIDE WHILE IN FLAT BAD: A LOT
SUGGESTED CMS G CODE MODIFIER DAILY ACTIVITY: CJ
TURNING FROM BACK TO SIDE WHILE IN FLAT BAD: UNABLE
MOVING FROM LYING ON BACK TO SITTING ON SIDE OF FLAT BED: UNABLE
DAILY ACTIVITIY SCORE: 6
STANDING UP FROM CHAIR USING ARMS: TOTAL
DAILY ACTIVITIY SCORE: 22
DRESSING REGULAR LOWER BODY CLOTHING: A LITTLE
SUGGESTED CMS G CODE MODIFIER DAILY ACTIVITY: CJ
DRESSING REGULAR UPPER BODY CLOTHING: A LOT
MOBILITY SCORE: 6
SUGGESTED CMS G CODE MODIFIER MOBILITY: CN
SUGGESTED CMS G CODE MODIFIER DAILY ACTIVITY: CM
HELP NEEDED FOR BATHING: A LITTLE
DRESSING REGULAR UPPER BODY CLOTHING: TOTAL
CLIMB 3 TO 5 STEPS WITH RAILING: TOTAL
DRESSING REGULAR UPPER BODY CLOTHING: TOTAL
DAILY ACTIVITIY SCORE: 8
SUGGESTED CMS G CODE MODIFIER MOBILITY: CM
MOVING TO AND FROM BED TO CHAIR: UNABLE
TOILETING: TOTAL
DAILY ACTIVITIY SCORE: 18
SUGGESTED CMS G CODE MODIFIER DAILY ACTIVITY: CN
TOILETING: TOTAL
MOVING TO AND FROM BED TO CHAIR: UNABLE
DAILY ACTIVITIY SCORE: 6
DAILY ACTIVITIY SCORE: 8
DRESSING REGULAR LOWER BODY CLOTHING: TOTAL
DRESSING REGULAR UPPER BODY CLOTHING: A LOT
MOVING FROM LYING ON BACK TO SITTING ON SIDE OF FLAT BED: A LITTLE
EATING MEALS: TOTAL
PERSONAL GROOMING: A LITTLE
MOVING TO AND FROM BED TO CHAIR: UNABLE
SUGGESTED CMS G CODE MODIFIER MOBILITY: CN
MOBILITY SCORE: 20
PERSONAL GROOMING: A LOT
HELP NEEDED FOR BATHING: TOTAL
HELP NEEDED FOR BATHING: TOTAL
CLIMB 3 TO 5 STEPS WITH RAILING: TOTAL
MOBILITY SCORE: 6
DRESSING REGULAR LOWER BODY CLOTHING: TOTAL
DRESSING REGULAR LOWER BODY CLOTHING: TOTAL
TOILETING: A LITTLE
MOBILITY SCORE: 7
TURNING FROM BACK TO SIDE WHILE IN FLAT BAD: UNABLE
STANDING UP FROM CHAIR USING ARMS: TOTAL
DRESSING REGULAR LOWER BODY CLOTHING: A LITTLE
WALKING IN HOSPITAL ROOM: A LITTLE
TOILETING: TOTAL
SUGGESTED CMS G CODE MODIFIER DAILY ACTIVITY: CM
TURNING FROM BACK TO SIDE WHILE IN FLAT BAD: UNABLE
HELP NEEDED FOR BATHING: A LITTLE
MOVING FROM LYING ON BACK TO SITTING ON SIDE OF FLAT BED: UNABLE
MOVING TO AND FROM BED TO CHAIR: UNABLE
CLIMB 3 TO 5 STEPS WITH RAILING: A LITTLE
WALKING IN HOSPITAL ROOM: TOTAL
EATING MEALS: TOTAL
WALKING IN HOSPITAL ROOM: TOTAL
STANDING UP FROM CHAIR USING ARMS: TOTAL
DRESSING REGULAR UPPER BODY CLOTHING: A LITTLE
DAILY ACTIVITIY SCORE: 21
PERSONAL GROOMING: A LOT
CLIMB 3 TO 5 STEPS WITH RAILING: TOTAL
STANDING UP FROM CHAIR USING ARMS: A LITTLE
WALKING IN HOSPITAL ROOM: A LITTLE
SUGGESTED CMS G CODE MODIFIER MOBILITY: CJ
TOILETING: A LITTLE
HELP NEEDED FOR BATHING: TOTAL
WALKING IN HOSPITAL ROOM: TOTAL
EATING MEALS: TOTAL
SUGGESTED CMS G CODE MODIFIER MOBILITY: CJ
HELP NEEDED FOR BATHING: TOTAL
TOILETING: A LITTLE
MOBILITY SCORE: 22
SUGGESTED CMS G CODE MODIFIER MOBILITY: CN
MOVING FROM LYING ON BACK TO SITTING ON SIDE OF FLAT BED: UNABLE
EATING MEALS: A LITTLE
WALKING IN HOSPITAL ROOM: TOTAL
PERSONAL GROOMING: TOTAL
HELP NEEDED FOR BATHING: TOTAL
DRESSING REGULAR UPPER BODY CLOTHING: TOTAL
DRESSING REGULAR LOWER BODY CLOTHING: TOTAL
CLIMB 3 TO 5 STEPS WITH RAILING: A LITTLE

## 2018-01-01 ASSESSMENT — LIFESTYLE VARIABLES
EVER_SMOKED: YES
EVER_SMOKED: YES
ALCOHOL_USE: NO
TOTAL SCORE: 0
TOTAL SCORE: 0
EVER FELT BAD OR GUILTY ABOUT YOUR DRINKING: NO
ALCOHOL_USE: NO
HAVE YOU EVER FELT YOU SHOULD CUT DOWN ON YOUR DRINKING: NO
HAVE PEOPLE ANNOYED YOU BY CRITICIZING YOUR DRINKING: NO
ALCOHOL_USE: YES
TOTAL SCORE: 0
DO YOU DRINK ALCOHOL: NO
EVER_SMOKED: YES
EVER HAD A DRINK FIRST THING IN THE MORNING TO STEADY YOUR NERVES TO GET RID OF A HANGOVER: NO
CONSUMPTION TOTAL: INCOMPLETE

## 2018-01-01 ASSESSMENT — ENCOUNTER SYMPTOMS
NAUSEA: 0
ABDOMINAL PAIN: 0
FEVER: 0
VOMITING: 0
MUSCULOSKELETAL NEGATIVE: 1
SPEECH CHANGE: 1
CHILLS: 0
ABDOMINAL PAIN: 0
FEVER: 0
COUGH: 0
COUGH: 0
CHILLS: 0
FEVER: 0
CHANGE IN LEVEL OF CONSCIOUSNESS: 1
EYES NEGATIVE: 1
SPEECH CHANGE: 1
COUGH CHARACTERISTICS: CONGESTED
PALPITATIONS: 0
SPEECH CHANGE: 1
SHORTNESS OF BREATH: 0
NAUSEA: 0
COUGH: 0
MUSCULOSKELETAL NEGATIVE: 1
NAUSEA: 0
SHORTNESS OF BREATH: 1
MUSCULOSKELETAL NEGATIVE: 1
MUSCULOSKELETAL NEGATIVE: 1
EYES NEGATIVE: 1
SPEECH CHANGE: 1
ABDOMINAL PAIN: 0
FEVER: 0
CHILLS: 0
COUGH CHARACTERISTICS: NON-PRODUCTIVE
COUGH: 0
COUGH: 1
VOMITING: 0
SHORTNESS OF BREATH: 0
VOMITING: 0
FEVER: 0
CHOKING: 1
BOWEL INCONTINENCE: 1
EYES NEGATIVE: 1
SHORTNESS OF BREATH: 0
STOOL FREQUENCY: MORE THAN TWICE DAILY
NAUSEA: 0
PALPITATIONS: 0
CHILLS: 0
MUSCULOSKELETAL NEGATIVE: 1
SPEECH CHANGE: 1
COUGH: 0
DIARRHEA: 1
DYSPNEA ACTIVITY LEVEL: AT REST
COUGH: 0
PALPITATIONS: 0
FEVER: 0
EYES NEGATIVE: 1
PALPITATIONS: 0
NAUSEA: 0
EYES NEGATIVE: 1
EYES NEGATIVE: 1
COUGH: 0
COUGH CHARACTERISTICS: WEAK
VOMITING: 0
MUSCULOSKELETAL NEGATIVE: 1
PALPITATIONS: 0
VOMITING: 0
MUSCULOSKELETAL NEGATIVE: 1
SHORTNESS OF BREATH: 0
CHILLS: 0
ABDOMINAL PAIN: 0
ABDOMINAL PAIN: 0
NAUSEA: 0
EYES NEGATIVE: 1
SPEECH CHANGE: 1
CHILLS: 0
PALPITATIONS: 0
VOMITING: 0
ABDOMINAL PAIN: 0
SHORTNESS OF BREATH: 0
NAUSEA: 0
CHILLS: 0
FEVER: 0
LAST BOWEL MOVEMENT: 64909
DYSPNEA ON EXERTION: 1
SPEECH CHANGE: 1
PALPITATIONS: 0
SHORTNESS OF BREATH: 0
COUGH CHARACTERISTICS: MOIST
ABDOMINAL PAIN: 0
SHORTNESS OF BREATH: 0
VOMITING: 0

## 2018-01-01 ASSESSMENT — PAIN SCALES - GENERAL
PAINLEVEL_OUTOF10: 0

## 2018-01-01 ASSESSMENT — GAIT ASSESSMENTS
GAIT LEVEL OF ASSIST: CONTACT GUARD ASSIST
GAIT LEVEL OF ASSIST: CONTACT GUARD ASSIST
GAIT LEVEL OF ASSIST: STAND BY ASSIST
ASSISTIVE DEVICE: 4 WHEEL WALKER
DISTANCE (FEET): 200
ASSISTIVE DEVICE: 4 WHEEL WALKER
DISTANCE (FEET): 400
DEVIATION: BRADYKINETIC;DECREASED HEEL STRIKE
GAIT LEVEL OF ASSIST: STAND BY ASSIST
DEVIATION: DECREASED BASE OF SUPPORT
GAIT LEVEL OF ASSIST: UNABLE TO PARTICIPATE
GAIT LEVEL OF ASSIST: SUPERVISED
DISTANCE (FEET): 400
GAIT LEVEL OF ASSIST: CONTACT GUARD ASSIST
ASSISTIVE DEVICE: 4 WHEEL WALKER
DEVIATION: BRADYKINETIC;DECREASED HEEL STRIKE
GAIT LEVEL OF ASSIST: SUPERVISED
DISTANCE (FEET): 150
DISTANCE (FEET): 300
DISTANCE (FEET): 200
GAIT LEVEL OF ASSIST: CONTACT GUARD ASSIST
ASSISTIVE DEVICE: 4 WHEEL WALKER
DEVIATION: DECREASED HEEL STRIKE;BRADYKINETIC
ASSISTIVE DEVICE: 4 WHEEL WALKER
ASSISTIVE DEVICE: HAND HELD ASSIST
DISTANCE (FEET): 200

## 2018-01-01 ASSESSMENT — BALANCE ASSESSMENTS
PICK UP OBJECT FROM THE FLOOR FROM A STANDING POSITION: 3
SITTING UNSUPPORTED: 4
STANDING TO SITTING: 4
REACHING FORWARD WITH OUTSTRETCHED ARM WHILE STANDING: 3
STANDING ON ONE LEG: 2
PLACE ALTERNATE FOOT ON STEP OR STOOL WHILE STANDING UNSUPPORTED: 1
TURN 360 DEGREES: 3
PLACE ALTERNATE FOOT ON STEP OR STOOL WHILE STANDING UNSUPPORTED: 1
STANDING UNSUPPORTED WITH FEET TOGETHER: 4
LONG VERSION TOTAL SCORE (MAX 56): 45
STANDING TO SITTING: 4
TRANSFERS: 4
STANDING UNSUPPORTED ONE FOOT IN FRONT: 2
LONG VERSION TOTAL SCORE (MAX 56): 40
SITTING UNSUPPORTED: 4
STANDING UNSUPPORTED WITH EYES CLOSED: 3
STANDING UNSUPPORTED: 4
TRANSFERS: 4
LOOK OVER LEFT AND RIGHT SHOULDERS WHILE STANDING: 1
SITTING TO STANDING: 4
STANDING UNSUPPORTED WITH FEET TOGETHER: 3
LONG VERSION TOTAL SCORE (MAX 56): 45
LOOK OVER LEFT AND RIGHT SHOULDERS WHILE STANDING: 4
LONG VERSION TOTAL SCORE (MAX 56): 40
STANDING UNSUPPORTED ONE FOOT IN FRONT: 3
TURN 360 DEGREES: 2
PICK UP OBJECT FROM THE FLOOR FROM A STANDING POSITION: 3
SITTING TO STANDING: 4
STANDING UNSUPPORTED: 3
STANDING UNSUPPORTED WITH EYES CLOSED: 3
REACHING FORWARD WITH OUTSTRETCHED ARM WHILE STANDING: 3
STANDING ON ONE LEG: 2

## 2018-01-01 ASSESSMENT — BRIEF INTERVIEW FOR MENTAL STATUS (BIMS)
WHAT MONTH IS IT: NO ANSWER
ASKED TO RECALL SOCK: YES, NO CUE REQUIRED
WHAT YEAR IS IT: MISSED BY MORE THAN 5 YEARS
BIMS SUMMARY SCORE: 3
INITIAL REPETITION OF BED BLUE SOCK - FIRST ATTEMPT: 3
ASKED TO RECALL BLUE: YES, NO CUE REQUIRED
WHAT MONTH IS IT: ACCURATE WITHIN 5 DAYS
ASKED TO RECALL BLUE: NO, COULD NOT RECALL
WHAT DAY OF THE WEEK IS IT: INCORRECT
ASKED TO RECALL BED: NO, COULD NOT RECALL
WHAT DAY OF THE WEEK IS IT: INCORRECT
WHAT YEAR IS IT: NO ANSWER
INITIAL REPETITION OF BED BLUE SOCK - FIRST ATTEMPT: 3
ASKED TO RECALL BED: NO, COULD NOT RECALL
ASKED TO RECALL SOCK: NO, COULD NOT RECALL
BIMS SUMMARY SCORE: 9

## 2018-01-01 ASSESSMENT — ACTIVITIES OF DAILY LIVING (ADL)
MONEY MANAGEMENT (EXPENSES/BILLS): TOTALLY DEPENDENT
AMBULATION_REQUIRES_ASSISTANCE: 1
SHOWER_TRANSFER_LEVEL_OF_ASSIST: ABLE TO COMPLETE SHOWER TRANSFER WITHOUT ASSIST
TOILETING_LEVEL_OF_ASSIST: ABLE TO COMPLETE TOILETING WITHOUT ASSIST
TOILETING: UNABLE TO DETERMINE AT THIS TIME
TOILET_TRANSFER_LEVEL_OF_ASSIST: ABLE TO COMPLETE TOILET TRANSFER WITHOUT ASSIST
TOILETING: INDEPENDENT
MONEY MANAGEMENT (EXPENSES/BILLS): TOTALLY DEPENDENT

## 2018-01-01 ASSESSMENT — PATIENT HEALTH QUESTIONNAIRE - PHQ9
SUM OF ALL RESPONSES TO PHQ9 QUESTIONS 1 AND 2: 0
1. LITTLE INTEREST OR PLEASURE IN DOING THINGS: NOT AT ALL
SUM OF ALL RESPONSES TO PHQ9 QUESTIONS 1 AND 2: 0
2. FEELING DOWN, DEPRESSED, IRRITABLE, OR HOPELESS: NOT AT ALL
1. LITTLE INTEREST OR PLEASURE IN DOING THINGS: NOT AT ALL
2. FEELING DOWN, DEPRESSED, IRRITABLE, OR HOPELESS: NOT AT ALL
SUM OF ALL RESPONSES TO PHQ9 QUESTIONS 1 AND 2: 0
1. LITTLE INTEREST OR PLEASURE IN DOING THINGS: NOT AT ALL
1. LITTLE INTEREST OR PLEASURE IN DOING THINGS: NOT AT ALL
SUM OF ALL RESPONSES TO PHQ9 QUESTIONS 1 AND 2: 0
2. FEELING DOWN, DEPRESSED, IRRITABLE, OR HOPELESS: NOT AT ALL

## 2018-01-01 ASSESSMENT — COPD QUESTIONNAIRES
COPD SCREENING SCORE: 4
DURING THE PAST 4 WEEKS HOW MUCH DID YOU FEEL SHORT OF BREATH: NONE/LITTLE OF THE TIME
HAVE YOU SMOKED AT LEAST 100 CIGARETTES IN YOUR ENTIRE LIFE: YES
DURING THE PAST 4 WEEKS HOW MUCH DID YOU FEEL SHORT OF BREATH: NONE/LITTLE OF THE TIME
COPD SCREENING SCORE: 4
DO YOU EVER COUGH UP ANY MUCUS OR PHLEGM?: NO/ONLY WITH OCCASIONAL COLDS OR INFECTIONS
IN THE PAST 12 MONTHS DO YOU DO LESS THAN YOU USED TO BECAUSE OF YOUR BREATHING PROBLEMS: DISAGREE/UNSURE
HAVE YOU SMOKED AT LEAST 100 CIGARETTES IN YOUR ENTIRE LIFE: YES
DO YOU EVER COUGH UP ANY MUCUS OR PHLEGM?: NO/ONLY WITH OCCASIONAL COLDS OR INFECTIONS

## 2018-01-01 ASSESSMENT — NEW YORK HEART ASSOCIATION (NYHA) CLASSIFICATION: PATIENT MEETS NYHA AND SIGNIFICANT SYMPTOMS CRITERIA: 1

## 2018-08-12 PROBLEM — E03.9 HYPOTHYROIDISM: Status: ACTIVE | Noted: 2018-01-01

## 2018-08-12 PROBLEM — I63.9 STROKE (CEREBRUM) (HCC): Status: ACTIVE | Noted: 2018-01-01

## 2018-08-12 PROBLEM — D72.829 LEUKOCYTOSIS: Status: ACTIVE | Noted: 2018-01-01

## 2018-08-12 PROBLEM — N17.9 ACUTE KIDNEY INJURY (HCC): Status: ACTIVE | Noted: 2018-01-01

## 2018-08-12 PROBLEM — D64.9 NORMOCYTIC ANEMIA: Status: ACTIVE | Noted: 2018-01-01

## 2018-08-12 PROBLEM — I10 HYPERTENSION: Chronic | Status: ACTIVE | Noted: 2018-01-01

## 2018-08-12 NOTE — PROGRESS NOTES
Patient completed MRI and echocardiogram this morning, physical therapy evaluation completed, patient is able to ambulate with contact guard one assist, please see PT note for further instructions. Speech therapy also evaluated patient, is now one to one supervision, dysphagia 1 with nectar thick liquids. IV fluids continue. Asked provider for nicotine replacement, patient smoked 1/2 pack daily, will provider with smoking cessation information. NIH assessment this morning at change of shift was 6, patient is not verbal at this time due to aphasia. Given paper and pen to write but was unable to, patient became frustrated when not able to express. Is able to answer yes and no questions. Oxygen still in place, patient's oxygen decreases to mid 80's on room air,  in place for monitoring. Neurologist evaluated patient at bedside. Continue fall protocol, family is now at bedside.

## 2018-08-12 NOTE — CARE PLAN
Problem: Communication  Goal: The ability to communicate needs accurately and effectively will improve  Outcome: NOT MET  Pt has severe expressive aphasia. Follow commands.    Problem: Safety  Goal: Will remain free from injury    Intervention: Provide assistance with mobility  Pt has bed alarm on. Up with standby assist.

## 2018-08-12 NOTE — PROGRESS NOTES
Daughter called and help answering some admission questions, MRI safety screening also completed. Updates given to daughter. Will monitor.

## 2018-08-12 NOTE — THERAPY
"  Speech Language Therapy Clinical Swallow Evaluation completed.  Functional Status: Bedside swallow evaluation completed today. Patient awake, tearful, with family at bedside. She has severe expressive aphasia and is currently non-verbal. She was able to follow simple commands and use head nod/shake to indicate 'yes/no'. She presents with symptoms of moderate oral-pharyngal dysphagia. Specifically, prolonged mastication and slightly reduced laryngeal elevation upon palpation. Swallow onset timing appears WNL. She exhibited possible overt signs of aspiration with ice chips and large sips of nectar thick liquids via cup (consistent delayed coughing). She consumed pureed solids and small sips of nectar thick liquids via spoon with no overt signs of aspiration. Recommend 1:1 assistance. Recommend cautiously initiate Dysphagia 1/nectar thick liquid diet. If patient presents with signs of aspiration (immediate or delayed cough, frequent throat clearing, wet/gurgly vocal sounds, incresed RR or SOB), then make patient NPO and notify MD and SLP. Speech therapy will continue to follow patient for dysphagia. A full aphasia evaluation will also be completed. Gave patient and family AAC sheets and educated them regarding aphasia, dysphagia, aspiration risk, and plan of care.  Recommendations - Diet: Diet / Liquid Recommendation: Dysphagia I, Nectar Thick Liquid                          Strategies: Strict 1:1 feeding , No Straws and Head of Bed at 90 Degrees                          Medication Administration: Medication Administration : Crush all Medications in Puree  Plan of Care: Will benefit from Speech Therapy 5 times per week  Post-Acute Therapy: Discharge to a transitional care facility for continued skilled therapy services.    See \"Rehab Therapy-Acute\" Patient Summary Report for complete documentation.   "

## 2018-08-12 NOTE — THERAPY
"Physical Therapy Evaluation completed.   Bed Mobility:  Supine to Sit: Supervised  Transfers: Sit to Stand: Contact Guard Assist  Gait: Level Of Assist: Contact Guard Assist with HHA       Plan of Care: Will benefit from Physical Therapy 3 times per week  Discharge Recommendations: Equipment: Will Continue to Assess for Equipment Needs.     Pt admitted for workup of CVA and presents without gross mobility impairment. She completed x400' of gait with hand held assist for stability but no overt LOB. Her largest deficit appears to be related to speech as she demonstrates symptoms of expressive aphasia. While here, pt will be seen by PT to ensure safety and determine if AD is necessary. Pending determination of her living situation, she may be able to return home (if someone will be able to assist); will defer DC recs to SLP and continue to assess.     See \"Rehab Therapy-Acute\" Patient Summary Report for complete documentation.     "

## 2018-08-12 NOTE — PROGRESS NOTES
Direct admit from [facility], Dr. Reeder, [phone].  Accepted by Dr. Smith for Acute CVA.  Dr. Gatica of [facility] Neurology will consult.  ADT signed & held @ [phone], needs to be released upon pt arrival.  No written orders received.  Pt coming by airflight.

## 2018-08-12 NOTE — PROGRESS NOTES
Pt was a direct admit for CVA. NIH 7. She is alert and oriented to person and place. Follows commands. Pt has severe expressive aphasia, she is non verbal at this time. On 2 liters of oxygen, sats normal. On tele, SR, with ST depression per monitor technician. Hospitalist notified, orders placed for EKG  and troponin times one. Bed alarm on. Call light within reach. Hourly rounding by staff.    Admission protocols initiated. Please complete when family is here, as pt is non verbal and not able to respond to some questions.    Two person admission skin check performed by two RNs, primary nurse and Charge nurse (HIMANSHU Love). See in flowsheet for details.

## 2018-08-12 NOTE — ASSESSMENT & PLAN NOTE
CT of the brain was done and showed small focus of decreased attenuation in the right frontal lobe area that is consistent with lacunar infarct.  No hemorrhage noted.  CTA of the brain was unremarkable. She was deemed outside of the tPA window. She remained globally aphasic. She was transferred here for Neurology service. Repeat CT head showed subtle infarct left frontal lobe. Carotid Dopplers showed <50% bilateral carotid stenosis. MRA head showed no large vessel occlusion although there was a lot of motion artifact. MRA neck PENDING. Echo normal EF, no significant valvular disease. Cardiology consulted. Rhythm strips were reviewed, there was the possibility of paroxysmal atrial fibrillation. Cardiology felt there is no indication for an implantable loop recorder. Neurology felt chronic anticoagulation can be started 2 weeks after discharge given that he had small petechial hemorrhages and were fine with aspirin and statin for now (see their note).

## 2018-08-12 NOTE — H&P
Hospital Medicine History & Physical Note    Date of Service  8/11/2018    Primary Care Physician  No primary care provider on file.    Consultants  None    Code Status  Full    Chief Complaint  Aphasia    History of Presenting Illness  History, review of systems, and physical exam are all limited due to aphasia.  No family members at bedside.  Information obtained from medical records.  78 y.o. female with past medical history of hypertension, hyperlipidemia, and hypothyroidism who was witnessed to be in her usual state of health 2 days ago by her family members.  However, on the day of admission around 7:30 AM 1 of the family members noted that the patient is unable to talk.  Patient was taken to outside facility where CT of the brain was done and showed small focus of decreased attenuation in the right frontal lobe area that is consistent with lacunar infarct.  No hemorrhage noted.  CTA of the brain was unremarkable.  Patient labs were remarkable for WBCs of 13.4, and creatinine of 1.6.  Patient was transferred here for higher level of care.    Review of Systems  Review of Systems   Unable to perform ROS: Patient nonverbal       Past Medical History  Hypertension  Hyperlipidemia  Hypothyroidism    Surgical History  Unknown past surgical history    Family History  Unknown family history.    Social History  Unknown social history    Allergies  Allergies   Allergen Reactions   • Codeine Rash     Rash       Medications  None       Physical Exam  Blood Pressure : 137/51   Temperature: 37.1 °C (98.7 °F)   Pulse: (!) 57   Respiration: 16   Pulse Oximetry: 94 %     Physical Exam   Constitutional: She appears well-developed. No distress.   HENT:   Head: Normocephalic and atraumatic.   Eyes: Left eye exhibits no discharge. No scleral icterus.   Neck: Neck supple. No JVD present. No tracheal deviation present. No thyromegaly present.   Cardiovascular: Normal rate and regular rhythm.  Exam reveals no friction rub.     Pulmonary/Chest: Breath sounds normal. No respiratory distress. She has no wheezes. She has no rales.   Abdominal: Bowel sounds are normal. She exhibits no distension. There is no tenderness.   Musculoskeletal: She exhibits no tenderness or deformity.   Neurological:   Patient is able to follow commands.  Has expressive aphasia.  No weakness noted on either upper or lower extremities.  Reflexes symmetrical bilaterally.   Skin: Skin is dry. No rash noted. No erythema.   Psychiatric:   Not assessable       Laboratory:  Recent Labs      08/12/18   0050   WBC  11.5*   RBC  3.56*   HEMOGLOBIN  10.5*   HEMATOCRIT  33.1*   MCV  93.0   MCH  29.5   MCHC  31.7*   RDW  51.8*   PLATELETCT  257   MPV  10.3         No results for input(s): ALTSGPT, ASTSGOT, ALKPHOSPHAT, TBILIRUBIN, DBILIRUBIN, GAMMAGT, AMYLASE, LIPASE, ALB, PREALBUMIN, GLUCOSE in the last 72 hours.              No results found for: TROPONINI    Urinalysis:    No results found     Imaging:  Echocardiogram Comp W/O cont    (Results Pending)   Carotid Duplex ([facility] [place] and Rehab Only) - Do not order if CTA - Neck done in ER    (Results Pending)   MR-BRAIN-W/O    (Results Pending)         Assessment/Plan:  I anticipate this patient will require at least two midnights for appropriate medical management, necessitating inpatient admission.    * Stroke (cerebrum) (HCC)- (present on admission)   Assessment & Plan    Not candidate for TPA due to unknown duration of symptoms.  Patient has expressive aphasia with facial drooping.  CT scan of the brain showed small focus of decreased attenuation in the right frontal lobe consistent with lacunar infarct.  No hemorrhage noted.  CTA of the brain was unremarkable.  Will check MRI of the brain.  Carotid Doppler.  Echocardiogram.  Start patient on aspirin and statin.  Will check lipid panel.  PT/OT/speech.  Fall precautions.        Acute kidney injury (HCC)- (present on admission)   Assessment & Plan    Acute versus acute  on chronic kidney injury.  Unknown creatinine baseline.  Avoid nephrotoxins.  Renally dose all medications.        Hypothyroidism- (present on admission)   Assessment & Plan    Continue home dose Synthroid.        Hypertension- (present on admission)   Assessment & Plan    Allow permissive hypertension for the first 72 hours.        Normocytic anemia- (present on admission)   Assessment & Plan    Monitor H&H.        Leukocytosis- (present on admission)   Assessment & Plan    Could be stress-induced.  Will check UA.  Will also check pro-calcitonin.            VTE prophylaxis: Heparin

## 2018-08-12 NOTE — CONSULTS
CC:  Unable to talk    Date of Admission: 8/11/2018    Today's Date: 08/12/18    Consulting Physician: Van Stallings M.D.      HPI:    Johana Weston is a 78 y.o. female R handed, who usually per family lives alone and takes care of herself  But whom per neighbor has been noticed with frequent falling and not being herself for some time already.  Last time heard normal was yesterday at 7:30 am and found at 2 pm unable to talk, reason for which she was brought to the ER in OSH and by the time of her arrival she was out of the TPA window  CTA showed no LVO and she was transferred for management.  Per family not known previous strokes, CAD, or A FIB.  She is globally aphasic and cannot provide any further information     ROS:   Patient unable to provide global aphasia, cannot write.      Past Medical History:   Past Medical History:   Diagnosis Date   • Anemia    • Hypertension    • Leucocytosis    • Renal disorder        Past Surgical History: History reviewed. No pertinent surgical history.    Social History:   Social History     Social History   • Marital status:      Spouse name: N/A   • Number of children: N/A   • Years of education: N/A     Occupational History   • Not on file.     Social History Main Topics   • Smoking status: Current Every Day Smoker     Packs/day: 1.00     Types: Cigarettes   • Smokeless tobacco: Never Used   • Alcohol use No   • Drug use: No   • Sexual activity: Not on file     Other Topics Concern   • Not on file     Social History Narrative   • No narrative on file       Family History: no strokes, no seizures, no CAD  Allergies:   Allergies   Allergen Reactions   • Codeine Rash     Rash         Current Facility-Administered Medications:   •  aspirin (ASA) chewable tab 81 mg, 81 mg, Oral, DAILY, Yenifer Floyd M.D.  •  Pharmacy Consult Request - Allow for Permissive Hypertension, , Other, PRN, Ramila Chan M.D.  •  labetalol (NORMODYNE,TRANDATE) injection 10 mg, 10  "mg, Intravenous, Q4HRS PRN **OR** hydrALAZINE (APRESOLINE) injection 10 mg, 10 mg, Intravenous, Q2HRS PRN, Ramila Chan M.D.  •  atorvastatin (LIPITOR) tablet 40 mg, 40 mg, Oral, Q EVENING, Ramila Chan M.D., Stopped at 08/11/18 2100  •  [DISCONTINUED] aspirin (ASA) tablet 325 mg, 325 mg, Oral, DAILY **OR** [DISCONTINUED] aspirin (ASA) chewable tab 324 mg, 324 mg, Oral, DAILY **OR** aspirin (ASA) suppository 300 mg, 300 mg, Rectal, DAILY, Ramila Chan M.D., 300 mg at 08/12/18 0523  •  senna-docusate (PERICOLACE or SENOKOT S) 8.6-50 MG per tablet 2 Tab, 2 Tab, Oral, BID, Stopped at 08/11/18 2100 **AND** polyethylene glycol/lytes (MIRALAX) PACKET 1 Packet, 1 Packet, Oral, QDAY PRN **AND** magnesium hydroxide (MILK OF MAGNESIA) suspension 30 mL, 30 mL, Oral, QDAY PRN **AND** bisacodyl (DULCOLAX) suppository 10 mg, 10 mg, Rectal, QDAY PRN, Ramila Chan M.D.  •  NS infusion, , Intravenous, Continuous, Ramila Chan M.D., Last Rate: 75 mL/hr at 08/11/18 2105  •  ondansetron (ZOFRAN) syringe/vial injection 4 mg, 4 mg, Intravenous, Q4HRS PRN, Ramila Chan M.D.  •  ondansetron (ZOFRAN ODT) dispertab 4 mg, 4 mg, Oral, Q4HRS PRN, Ramila Chan M.D.      PHYSICAL EXAM    Vitals:    08/11/18 1959 08/12/18 0000 08/12/18 0400 08/12/18 0800   BP: 139/52 137/51 117/49 119/55   Pulse: (!) 56 (!) 57 (!) 56 60   Resp: 18 16 16 16   Temp: 37.3 °C (99.1 °F) 37.1 °C (98.7 °F) 36.5 °C (97.7 °F) 36.8 °C (98.2 °F)   SpO2: 94% 94% 95% 94%   Weight: 61.3 kg (135 lb 2.3 oz)      Height: 1.651 m (5' 5\")          Head/Neck: NCAT. no meningismus neg kernig neg brudzinski. No obvious mass or heard bruit. No tender arteries or lost pulses. No rash of head or neck.    Cardiovascular: Regular, rhythmic    Pulmonary: Normal breath sounds    Extremities: Normal inspection, No edema, normal pulses    Skin: Warm, dry, intact. No rashes observed head/neck or body  No stigmata    Eyes/Funduscopic: normal conjunctiva, " no lid swelling    Mental Status: Awake, alert  Is very frustrated because cannot talk and seems very depressed.  Able to pronounce a few words with minimal dysarthria.  Comprehension is much preserved.      Cranial Nerves: CN II-XII showed: left facial weakness  Motor:  Has drift in the RLE at 5 seconds      Sensory: unreliable, reacts to pain in all  Coordination: dysmetria absent    DTR's: + ;  no clonus.    Babinski: neg    Gait/Station: deferred       NIHSS:     1a: level of conciousness 0  1b:month/age1  1c:open eyes/close hand1  2. Best gaze0  3:Visual fields0  4: facial palsy1  5: a motor left arm0  5 b: motor right arm0  6 a: motor left leg0  6 b : motor right leg1  7: limb ataxia0  8: sensory0  9: best language3  10: dysarthria1  11: extinction /neglect: 0    Total: 9      Labs:  Recent Labs      08/12/18   0050   WBC  11.5*   RBC  3.56*   HEMOGLOBIN  10.5*   HEMATOCRIT  33.1*   MCV  93.0   MCH  29.5   MCHC  31.7*   RDW  51.8*   PLATELETCT  257   MPV  10.3     Recent Labs      08/12/18   0050   SODIUM  143   POTASSIUM  3.8   CHLORIDE  112   CO2  24   GLUCOSE  98   BUN  27*   CREATININE  1.38   CALCIUM  8.6             Recent Labs      08/12/18   0050   TROPONINI  0.02     Recent Labs      08/12/18   0050   TRIGLYCERIDE  67   HDL  39*   LDL  42     Recent Labs      08/12/18   0050   SODIUM  143   POTASSIUM  3.8   CHLORIDE  112   CO2  24   GLUCOSE  98   BUN  27*     Recent Labs      08/12/18   0050   SODIUM  143   POTASSIUM  3.8   CHLORIDE  112   CO2  24   BUN  27*   CREATININE  1.38   CALCIUM  8.6         No results found for this or any previous visit.           Imaging: neuroimaging reviewed and directly visualized by me  CAROTID DUPLEX         MR-BRAIN-W/O   Final Result      1.  Small area of acute or subacute ischemia in the high LEFT frontal cortex with petechial hemorrhage   2.  Remote RIGHT frontal cortical ischemic injury   3.  Mild atrophy   4.  Advanced white matter changes      Echocardiogram  Comp W/O cont    (Results Pending)   MR-MRA HEAD-WITH    (Results Pending)   MR-MRA NECK-WITH    (Results Pending)       Assessment/Plan:      Acute Left MCA ischemic stroke: in patient with cognitive declining and suspicions of ministrokes in the past.  Embolic source is suspected, as no occlusive disease seeing in the CTA done in OSH.  MRI stat showed Left MCA branch stroke with petechial hemorraghic transformation that does not precludes the continuation of the DVT prophylaxis or ASA  Continue statins.  ECHO pending  Telemetry pending.  AFIB highly suspected, if no found during hospital stay she will need LOOP before discharge  Recommend cardiology consultation now; in preparation    Hemorraghic transformation in the stroke: no symptomatic, recommend continue ASA and the dvt prophylaxis, get CT head tomorrow AM to be certain that there is no bleed signal in the CT     HTN chronic : permissive hypertension for now, stop home HTN medications; cardene only if B/P>220/110  Continue thyroid medication      Yenifer Floyd M.D.  Stroke Director  Clinical Professor, [employer]  Diplomate, Neurology , Vascular Neurology, Neuphysiology

## 2018-08-12 NOTE — PROGRESS NOTES
[facility] Hospitalist Progress Note    Date of Service: 2018    Chief Complaint  78 y.o. female with past medical history of hypertension, hyperlipidemia, and hypothyroidism who was noticed by family to have walking difficulty, OSH CT of the brain was done and showed small focus of decreased attenuation in the right frontal lobe area that is consistent with lacunar infarct.  No hemorrhage noted.  CTA of the brain was unremarkable.    Interval Problem Update  Family in room patient still unable to talk  Easily frustrated  Seen by PT and OT, speech recommended nectar thick, one-to-one  Seen by neurology, echo pending    Consultants/Specialty  Neurology    Disposition  Anticipated stay greater than 2 midnights, likely skilled nursing after        Review of Systems   Unable to perform ROS: Acuity of condition      Physical Exam  Laboratory/Imaging   Hemodynamics  Temp (24hrs), Av.9 °C (98.5 °F), Min:36.5 °C (97.7 °F), Max:37.3 °C (99.1 °F)   Temperature: 37.1 °C (98.8 °F)  Pulse  Av.9  Min: 56  Max: 64    Blood Pressure : 120/45      Respiratory      Respiration: 16, Pulse Oximetry: 98 %        RUL Breath Sounds: Clear, RML Breath Sounds: Clear, RLL Breath Sounds: Diminished, JOVI Breath Sounds: Diminished, LLL Breath Sounds: Clear    Fluids    Intake/Output Summary (Last 24 hours) at 18 1713  Last data filed at 18 0524   Gross per 24 hour   Intake              599 ml   Output                0 ml   Net              599 ml       Nutrition  Orders Placed This Encounter   Procedures   • Diet Order Regular     Standing Status:   Standing     Number of Occurrences:   1     Order Specific Question:   Diet:     Answer:   Regular [1]     Order Specific Question:   Texture/Fiber modifications:     Answer:   Dysphagia 1(Pureed)specify fluid consistency(question 6) [1]     Order Specific Question:   Consistency/Fluid modifications:     Answer:   Nectar Thick [2]     Order Specific Question:   Miscellaneous  modifications:     Answer:   SLP - 1:1 Supervision by Nursing [21]   • DIET NPO     Standing Status:   Standing     Number of Occurrences:   8     Order Specific Question:   Restrict to:     Answer:   Sips with Medications [3]     Physical Exam   Constitutional: She appears distressed.   HENT:   Head: Normocephalic and atraumatic.   Mouth/Throat: No oropharyngeal exudate.   Eyes: Conjunctivae and EOM are normal. No scleral icterus.   Neck: Normal range of motion. Neck supple.   Cardiovascular: Normal rate and normal heart sounds.    Pulmonary/Chest: Effort normal. She has no wheezes.   Abdominal: Soft. She exhibits no distension.   Neurological:   A phasic   Skin: Skin is warm. No rash noted. She is not diaphoretic.       Recent Labs      08/12/18   0050   WBC  11.5*   RBC  3.56*   HEMOGLOBIN  10.5*   HEMATOCRIT  33.1*   MCV  93.0   MCH  29.5   MCHC  31.7*   RDW  51.8*   PLATELETCT  257   MPV  10.3     Recent Labs      08/12/18   0050   SODIUM  143   POTASSIUM  3.8   CHLORIDE  112   CO2  24   GLUCOSE  98   BUN  27*   CREATININE  1.38   CALCIUM  8.6             Recent Labs      08/12/18   0050   TRIGLYCERIDE  67   HDL  39*   LDL  42          Assessment/Plan     * Stroke (cerebrum) (HCC)- (present on admission)   Assessment & Plan    Not candidate for TPA due to unknown duration of symptoms.  Patient has expressive aphasia with facial drooping, minimal improvement since admission. CT scan of the brain showed small focus of decreased attenuation in the right frontal lobe consistent with lacunar infarct.  No hemorrhage noted.  CTA of the brain was unremarkable.      Echocardiogram shows:    No prior study is available for comparison.   Left ventricular ejection fraction is visually estimated to be 65%.  Grade II diastolic dysfunction.  Moderately dilated left atrium.  Estimated right ventricular systolic pressure  is 55 mmHg.    MRI shows:  1.  Small area of acute or subacute ischemia in the high LEFT frontal cortex  with petechial hemorrhage  2.  Remote RIGHT frontal cortical ischemic injury  3.  Mild atrophy  4.  Advanced white matter changes    Neurology okay with aspirin and DVT prophylaxis  Potential A. fib/cardial embolic stroke  Updated cardiology, formal consult order placed for evaluation        Acute kidney injury (HCC)- (present on admission)   Assessment & Plan    Acute versus acute on chronic kidney injury.  Unknown creatinine baseline.  Avoid nephrotoxins.  Renally dose all medications.  Recheck BMP in a.m.        Hypothyroidism- (present on admission)   Assessment & Plan    Continue home dose Synthroid.        Hypertension- (present on admission)   Assessment & Plan    Allow permissive hypertension for the first 72 hours.        Normocytic anemia- (present on admission)   Assessment & Plan    Monitor H&H.        Leukocytosis- (present on admission)   Assessment & Plan    Suspect stress-induced/reactive.  UA negative.  Pro-calcitonin pending.          Quality-Core Measures   Reviewed items::  Labs reviewed, Medications reviewed and Radiology images reviewed  Petersen catheter::  No Petersen

## 2018-08-12 NOTE — PROGRESS NOTES
Medical records from [facility]:  ED report, Labs, Radiology report, and Demographics.  Scanned into Media tab.

## 2018-08-13 PROBLEM — I48.0 PAROXYSMAL A-FIB (HCC): Status: ACTIVE | Noted: 2018-01-01

## 2018-08-13 NOTE — DISCHARGE PLANNING
Received Choice form at 7242  Agency/Facility Name: [facility]  Referral sent per Choice form @ 1318

## 2018-08-13 NOTE — PROGRESS NOTES
NEUROLOGY PROGRESS NOTE      Background:  78 y.o. female was admitted on 2018  7:55 PM for Acute CVA    SUBJECTIVE: She is nonverbal with significant expressive aphasia, however, she is able to follow command with no receptive aphasia.    NEUROLOGICAL EXAM:   She has significant expressive aphasia is nonverbal, however, there is no evidence of receptive aphasia and she is able to follow simple and complex commands.  All cranial nerves were normal: Pupils were equally round and reactive to light.  Motor: (tone, bulk and strength): 5/5 strength in right upper and lower extremity, proximal and distal - no abnormal movement noted.  Patient has normal muscle bulk and tone.  There is subtle weakness of right upper and lower extremity.  Sensation (all modalities) was normal  Reflexes were normal and symmetrical in both upper and lower extremities    OBJECTIVE:     NEUROIMAGIN.  Small area of acute or subacute ischemia in the high LEFT frontal cortex with petechial hemorrhage  2.  Remote RIGHT frontal cortical ischemic injury  3.  Mild atrophy  4.  Advanced white matter changes        MEDICATIONS:  Current Facility-Administered Medications   Medication Dose   • metoprolol SR (TOPROL XL) tablet 25 mg  25 mg   • aspirin (ASA) chewable tab 81 mg  81 mg   • nicotine (NICODERM) 7 MG/24HR 7 mg  7 mg   • heparin injection 5,000 Units  5,000 Units   • labetalol (NORMODYNE,TRANDATE) injection 10 mg  10 mg    Or   • hydrALAZINE (APRESOLINE) injection 10 mg  10 mg   • atorvastatin (LIPITOR) tablet 40 mg  40 mg   • senna-docusate (PERICOLACE or SENOKOT S) 8.6-50 MG per tablet 2 Tab  2 Tab    And   • polyethylene glycol/lytes (MIRALAX) PACKET 1 Packet  1 Packet    And   • magnesium hydroxide (MILK OF MAGNESIA) suspension 30 mL  30 mL    And   • bisacodyl (DULCOLAX) suppository 10 mg  10 mg   • ondansetron (ZOFRAN) syringe/vial injection 4 mg  4 mg   • ondansetron (ZOFRAN ODT) dispertab 4 mg  4 mg       Blood pressure 131/49,  "pulse 60, temperature 37.1 °C (98.7 °F), resp. rate 16, height 1.651 m (5' 5\"), weight 61.3 kg (135 lb 2.3 oz), SpO2 95 %, not currently breastfeeding.    Recent Labs      08/12/18   0050   TROPONINI  0.02     Recent Labs      08/12/18   0050   WBC  11.5*   RBC  3.56*   HEMOGLOBIN  10.5*   HEMATOCRIT  33.1*   MCV  93.0   MCH  29.5   MCHC  31.7*   RDW  51.8*   PLATELETCT  257   MPV  10.3     Recent Labs      08/12/18   0050  08/13/18   0435   SODIUM  143  145   POTASSIUM  3.8  3.8   CHLORIDE  112  115*   CO2  24  26   GLUCOSE  98  88   BUN  27*  14       Results for orders placed or performed during the hospital encounter of 08/11/18   Echocardiogram Comp W/O cont   Result Value Ref Range    Eject.Frac. MOD BP 73.45     Eject.Frac. MOD 4C 67.11     Eject.Frac. MOD 2C 78.51     Left Ventrical Ejection Fraction 65         ASSESSMENT AND PLAN:    78-year-old female with small area of acute or subacute ischemia in the high LEFT frontal cortex with petechial hemorrhage. Her stroke workup has been completed. Her carotid ultrasound revealed no hemodynamically significant stenosis. She'll continue with aspirin and statin. She had evidence of atrial fibrillation on telemetry. She will require anticoagulation for secondary stroke prevention in light of an atrial fibrillation which can be started in 2 weeks due to petechial hemorrhage if no other contraindication. She will continue with speech therapy, occupational and physical therapy.  Neurology will sign off, please call me if there is any question.  "

## 2018-08-13 NOTE — TELEPHONE ENCOUNTER
----- Message from VIVIANA Morris sent at 8/12/2018  3:41 PM PDT -----  Another loop on Monday 8/13 thx    Per Dr Stratton

## 2018-08-13 NOTE — THERAPY
"Speech Language Therapy Evaluation completed to address speech  Functional Status:  Patient seen for an Aphasia evaluation on this date.  Patient presented with severe to profound expressive deficits.  She only produced minimal spontaneous grunts and brief vocalizations but no spontaneous or purposeful words.  In a writing task, she antonella a poorly formed [place] and wrote simple words to dictation, however, more complex words (greater than 3 letters) resulted in letter additions and substitutions.  Legibility was moderate-severely impaired.  Patient had impaired reading.  Letter identification was inconsistent as well as reading simple words.  She demonstrated increased frustration with reading and speech tasks.  In the clock drawing task, the patient had number omissions, impaired number sequencing, and incorrect hand placement.    Recommendations:  The patient would benefit from continued SLP services to address aphasia and dysphagia during the acute setting as well as intensive therapy during the next level of care.   Plan of Care: Will benefit from Speech Therapy 5 times per week  Post-Acute Therapy: Discharge to a transitional care facility for continued skilled therapy services.    See \"Rehab Therapy-Acute\" Patient Summary Report for complete documentation.   "

## 2018-08-13 NOTE — PROGRESS NOTES
"Pt resting comfortably. Pt still experiencing expressive aphasia. Able to answer \"yes\" or \"no\" questions, but unable to speak. Denied pain and discomfort throughout shift. Pt ambulates with 1 assist. Telemetry tech notified this RN that the pt had converted to A flutter at 2305. On call hospitalist notified with no new orders given. Pt converted back to NSR by 0500. Fall precautions in place. No new complaints at this time.      "

## 2018-08-13 NOTE — DISCHARGE PLANNING
TCN spoke with patient daughter in law, Lois to discuss the patient's PLOF and DC plan. Patient was living alone in a single story apartment. Patient was driving. Patient has FWW and SPC that she recently received but rarely used. Patient has 3 adult children who live near by in [place]. TCN offered information on acute rehab vs SNF. Lois is agreeable to either level of care but requested if SNF is  required that choice be faxed to [facility] in [place]. Choice was faxed to [facility].     Recommend placement of Rehab consult to assist with DC planning. If rehab is unable to accept SNF choice is pending.

## 2018-08-13 NOTE — PROGRESS NOTES
[facility] Hospitalist Progress Note    Date of Service: 2018    Chief Complaint  78 y.o. female with past medical history of hypertension, hyperlipidemia, and hypothyroidism who was noticed by family to have walking difficulty, OSH CT of the brain was done and showed small focus of decreased attenuation in the right frontal lobe area that is consistent with lacunar infarct.  No hemorrhage noted.  CTA of the brain was unremarkable.    Interval Problem Update  Easily frustrated, still aphasic but improving  Seen by PT and OT, speech recommended nectar thick, one-to-one  Seen by neurology, echo nl    Consultants/Specialty  Neurology    Disposition  Continued therapies, discharge planning        Review of Systems   Unable to perform ROS: Acuity of condition      Physical Exam  Laboratory/Imaging   Hemodynamics  Temp (24hrs), Av °C (98.6 °F), Min:36.7 °C (98 °F), Max:37.4 °C (99.3 °F)   Temperature: 37.1 °C (98.7 °F)  Pulse  Av.5  Min: 56  Max: 96    Blood Pressure : 131/49      Respiratory      Respiration: 16, Pulse Oximetry: 95 %        RUL Breath Sounds: Clear, RML Breath Sounds: Clear, RLL Breath Sounds: Diminished, JOVI Breath Sounds: Diminished, LLL Breath Sounds: Clear    Fluids  No intake or output data in the 24 hours ending 18 1704    Nutrition  Orders Placed This Encounter   Procedures   • Diet Order Regular     Standing Status:   Standing     Number of Occurrences:   1     Order Specific Question:   Diet:     Answer:   Regular [1]     Order Specific Question:   Texture/Fiber modifications:     Answer:   Dysphagia 1(Pureed)specify fluid consistency(question 6) [1]     Order Specific Question:   Consistency/Fluid modifications:     Answer:   Nectar Thick [2]     Physical Exam   Constitutional: She appears distressed.   HENT:   Head: Normocephalic and atraumatic.   Mouth/Throat: No oropharyngeal exudate.   Eyes: Conjunctivae and EOM are normal. No scleral icterus.   Neck: Normal range of motion.  Neck supple.   Cardiovascular: Normal rate and normal heart sounds.    Pulmonary/Chest: Effort normal. She has no wheezes.   Abdominal: Soft. She exhibits no distension.   Neurological:   A phasic   Skin: Skin is warm. No rash noted. She is not diaphoretic.   Nursing note and vitals reviewed.      Recent Labs      08/12/18   0050   WBC  11.5*   RBC  3.56*   HEMOGLOBIN  10.5*   HEMATOCRIT  33.1*   MCV  93.0   MCH  29.5   MCHC  31.7*   RDW  51.8*   PLATELETCT  257   MPV  10.3     Recent Labs      08/12/18   0050  08/13/18   0435   SODIUM  143  145   POTASSIUM  3.8  3.8   CHLORIDE  112  115*   CO2  24  26   GLUCOSE  98  88   BUN  27*  14   CREATININE  1.38  0.92   CALCIUM  8.6  8.5             Recent Labs      08/12/18   0050   TRIGLYCERIDE  67   HDL  39*   LDL  42          Assessment/Plan     * Stroke (cerebrum) (HCC)- (present on admission)   Assessment & Plan    Not candidate for TPA due to unknown duration of symptoms.  Patient has expressive aphasia with facial drooping, minimal improvement since admission. CT scan of the brain showed small focus of decreased attenuation in the right frontal lobe consistent with lacunar infarct.  No hemorrhage noted.  CTA of the brain was unremarkable.      Echocardiogram shows:    No prior study is available for comparison.   Left ventricular ejection fraction is visually estimated to be 65%.  Grade II diastolic dysfunction.  Moderately dilated left atrium.  Estimated right ventricular systolic pressure  is 55 mmHg.    MRI shows:  1.  Small area of acute or subacute ischemia in the high LEFT frontal cortex with petechial hemorrhage  2.  Remote RIGHT frontal cortical ischemic injury  3.  Mild atrophy  4.  Advanced white matter changes    Neurology okay with aspirin and DVT prophylaxis  Potential A. fib/cardial embolic stroke, AFIB noted on strip in hospital  Cardiology consulted but no additional plans from their view  On BB now, continue rate control, ASA and statin  Continue  therapies  Should start AC 2 weeks out unless another contraindication comes up  Discharge planning to SNF        Paroxysmal A-fib (HCC)   Assessment & Plan    Seen on tele  Should start AC 2 weeks out unless another contraindication comes up  For now continue ASA+statin        Acute kidney injury (HCC)- (present on admission)   Assessment & Plan    Acute versus acute on chronic kidney injury, although this is likely baseline. Monitor.         Hypothyroidism- (present on admission)   Assessment & Plan    Continue home dose Synthroid.        Hypertension- (present on admission)   Assessment & Plan    Well controlled, continue BB        Normocytic anemia- (present on admission)   Assessment & Plan    No e/o acute bleeding, monitor.         Leukocytosis- (present on admission)   Assessment & Plan    Suspect stress-induced/reactive.  UA negative.           Quality-Core Measures   Reviewed items::  Labs reviewed, Medications reviewed and Radiology images reviewed  Petersen catheter::  No Petersen

## 2018-08-13 NOTE — PROGRESS NOTES
Monitor Summary: SB-SR 56-64,  QRS .06, with converted A-flutter at 2305 & back to SR at 0500 per strip from monitor room

## 2018-08-13 NOTE — PROGRESS NOTES
Assumed care of patient at 0700.  Report received at bedside.  Patient is A&O, but aphasic, so determining level of orientation is difficult.  Expected to have a loop recorder placed today.  Hourly rounding in place.

## 2018-08-13 NOTE — CONSULTS
DATE OF SERVICE:  08/13/2018    CHIEF COMPLAINT:  Evaluation for implantable loop recorder.    HISTORY OF PRESENT ILLNESS:  The patient is a 78-year-old female seen in   consultation at the request of Dr. Stallings for evaluation of above problem.  The   patient was transferred here from [place] because of new onset of aphasia   and weakness.  She has been seen by neurology and neurology requested   implantable loop recorder placed because of suspicion of PAF.  The patient has   a dense expressive aphasia.  No history can be obtained other than what is in   the chart.  There are no family members with the patient.  She has history of   hypertension, hyperlipidemia and thyroid replacement.  She is a cigarette   smoker.    Further past medical history cannot be obtained.    MEDICATIONS ON ADMISSION:  Atorvastatin 40 mg a day, levothyroxine 100 mcg a   day, losartan 100 mg a day, metoprolol XL 50 mg a day, multivitamin as needed,   Naprosyn, and triamterene/hydrochlorothiazide.    REVIEW OF SYSTEMS:  Not obtainable.    PHYSICAL EXAMINATION:  GENERAL:  Reveals a pleasant lady who is mildly anxious.  She has a dense   expressive aphasia.  She is currently in regular rhythm at 60 per minute.  VITAL SIGNS:  Blood pressure is 139/57.  HEENT: Gaze is conjugate.  Sclerae nonicteric.  There is no obvious facial   droop.  NECK:  There is no JVD.  I do not hear any bruit.  LUNGS:  Diminished breath sounds, otherwise clear.  HEART:  Regular rate and rhythm without S3, without murmur.  ABDOMEN:  Soft and nontender.  No pain to palpation.  CHEST:  Without deformity.  EXTREMITIES:  No edema.  Posterior tibial pulses are intact.  NEUROLOGIC:  Extension of the feet is equal bilaterally.  She has a dense   expressive aphasia.    IMPRESSION:  Status post cerebrovascular accident.    Reviewing the chart, there was actually a rhythm strips in the chart   confirming the patient has had PAF.  Her atrial fibrillation, rate is well    controlled, would continue metoprolol that she was taking as an outpatient.    At this time, there is no indication for an implantable loop recorder as   atrial fibrillation has been documented previously on the rhythm strip.    Cardiology will see again should further problems arise.       ____________________________________     MD YVAN AMBROSE / GILBERTO    DD:  08/13/2018 14:17:32  DT:  08/13/2018 14:32:09    D#:  [id number]  Job#:  [id number]

## 2018-08-14 NOTE — THERAPY
Patient NPO for FEES which was scheduled for today at 1330.  However, upon arrival the patient had been given Ativan for an MRI and was awaiting transport.  FEES held today.  Patient to continue NPO pending FEES in the AM.

## 2018-08-14 NOTE — DISCHARGE SUMMARY
Discharge Summary    CHIEF COMPLAINT ON ADMISSION  No chief complaint on file.  Difficulty talking    Reason for Admission  Acute CVA     CODE STATUS  Full Code    HPI & HOSPITAL COURSE  This is a 78 y.o. female here with difficulty talking    Please review Dr. Curtis Smith M.D. notes for further details of history of present illness, past medical/social/family histories, allergies and medications. Please review Dr. Hilliard, Neurology, Dr. Casillas Cardiology consultation notes.    Basically family noted that she was unable to talk. Patient was taken to outside facility where CT of the brain was done and showed small focus of decreased attenuation in the right frontal lobe area that is consistent with lacunar infarct.  No hemorrhage noted.  CTA of the brain was unremarkable. She was deemed outside of the tPA window. She remained globally aphasic. She was transferred here for Neurology service. Repeat CT head showed subtle infarct left frontal lobe. Carotid Dopplers showed <50% bilateral carotid stenosis. MRA head showed no large vessel occlusion although there was a lot of motion artifact. MRA neck PENDING. Echo normal EF, no significant valvular disease. Cardiology consulted. Rhythm strips were reviewed, there was the possibility of paroxysmal atrial fibrillation. Cardiology felt there is no indication for an implantable loop recorder. Neurology felt chronic anticoagulation can be started 2 weeks after discharge given that he had small petechial hemorrhages and were fine with aspirin and statin for now (see their note). Currently her heart rate is stable. Speech saw her and noted she may be aspirating thin liquids. They mentioned she can go on dysphagia, thick liquids with 1:1 feeding. They mentioned a modified barium swallow can be done at rehab. At discharge date she was still minimally verbal but pleasant, smiling and stable. She also has passed her swallowing studies and tolerated food. She does nod yes or  no to answer questions. She likely has cognitive deficits baseline.       Her creatinine is baseline. Thiazide has therefore been d/shawn for now. She will continue her other blood pressure medicines. They are currently stable. Blood pressure cn be checked at the [facility] facility. Continue Synthroid for hypothyroidism. Anemia is mild. Currently no active bleeding diathesis. Further workup can be done by primary provider. Leukocytosis is mild and thought to be stress induced. Repeat CBC showed resolution of mild leukocytosis.    At discharge date, Johana Weston afebrile and hemodynamically stable. Moved bowels, worked with PT and Speech. At dischrge she was actually smiling and uttering some words, which is actually a big improvement.  Johana Weston wanted to be discharged today.    Discharge Physical Exam  General/Constitutional: No acute distress. Elderly, frail  Head: Normocephalic, atraumatic  ENT: Oral mucosa is moist. No obvious pharyngeal exudates  Eyes: Pink conjunctiva, no scleral icterus  Neck: Supple, no lymphadenopathy  Cardiovascular: Normal rate and regular rhythm. S1,2 noted. No murmurs, gallops or rubs.  Pulmonary: Clear to auscultation bilaterally. No wheezes, rales or rhonchi.  Abdominal: Soft, nontender, not distended, bowel sounds normoactive. No guarding or peritoneal signs.  Musculoskeletal: No tenderness to palpation of chest wall.  Neurologic: Alert and oriented. Generalized weakness. Cognitive deficits. Aphasic.  Genitourinary: No gross hematuria  Skin: No obvious rash.  Psychiatric: Pleasant, cooperative.  Vitals Reviewed  Labs Reviewed  Imaging reviewed  Nursing notes reviewed    Imaging  MR-MRA HEAD-W/O   Final Result         Severely motion degraded exam shows no evidence of large vessel occlusion. Aneurysm is not excluded by this exam. Consider repeating when the patient may be better able to remain still.      CAROTID DUPLEX   Final Result      CT-HEAD W/O   Final Result      1.  Subacute  infarct in the left frontal lobe. When compared with the previous MRI dated 8/12/2018 the hemorrhagic component is not well appreciated.   2.  Moderate chronic microvascular ischemic disease.   3.  Age-appropriate cerebral atrophy.      Echocardiogram Comp W/O cont   Final Result      MR-BRAIN-W/O   Final Result      1.  Small area of acute or subacute ischemia in the high LEFT frontal cortex with petechial hemorrhage   2.  Remote RIGHT frontal cortical ischemic injury   3.  Mild atrophy   4.  Advanced white matter changes      MR-MRA NECK-W/O    (Results Pending)           Therefore, she is discharged in fair and stable condition to an inpatient rehabilitation hospital.    The patient met 2-midnight criteria for an inpatient stay at the time of discharge.      FOLLOW UP ITEMS POST DISCHARGE  Speech to follow, possible modified barium swallow and CXR in 1-2 days.    DISCHARGE DIAGNOSES  Principal Problem:    Stroke (cerebrum) (HCC) POA: Yes  Active Problems:    Paroxysmal A-fib (HCC) POA: Unknown    Leukocytosis POA: Yes    Normocytic anemia POA: Yes    Hypertension (Chronic) POA: Yes    Hypothyroidism POA: Yes    Acute kidney injury (HCC) POA: Yes    Dysphagia    FOLLOW UP  Follow up with attending physician at skilled facility upon receipt  Follow up with Dr. Hilliard or neurologist in 1 week for CVA  Follow up with Dr. Casillas or cardiologist in 1 week for PAF  Speech to follow as above    MEDICATIONS ON DISCHARGE     Medication List      START taking these medications      Instructions   aspirin 81 MG Chew chewable tablet  Commonly known as:  ASA   Take 1 Tab by mouth every day.  Dose:  81 mg     polyethylene glycol/lytes Pack  Commonly known as:  MIRALAX   Take 1 Packet by mouth 1 time daily as needed (if sennosides and docusate ineffective after 24 hours).  Dose:  17 g     senna-docusate 8.6-50 MG Tabs  Commonly known as:  PERICOLACE or SENOKOT S   Take 2 Tabs by mouth 2 Times a Day.  Dose:  2 Tab        CHANGE  how you take these medications      Instructions   metoprolol SR 25 MG Tb24  What changed:  · medication strength  · how much to take  Commonly known as:  TOPROL XL   Take 1 Tab by mouth every day.  Dose:  25 mg        CONTINUE taking these medications      Instructions   levothyroxine 100 MCG Tabs  Commonly known as:  SYNTHROID   Take 100 mcg by mouth Every morning on an empty stomach.  Dose:  100 mcg     LIPITOR 40 MG Tabs  Generic drug:  atorvastatin   Take 40 mg by mouth every evening.  Dose:  40 mg     losartan 100 MG Tabs  Commonly known as:  COZAAR   Take 100 mg by mouth every day.  Dose:  100 mg     MELATONIN PO   Take 1 Tab by mouth every bedtime.  Dose:  1 Tab     multivitamin Tabs   Take 1 Tab by mouth every day.  Dose:  1 Tab     naproxen 500 MG Tabs  Commonly known as:  NAPROSYN   Take 500 mg by mouth every day.  Dose:  500 mg        STOP taking these medications    triamterene-hctz 37.5-25 MG Tabs  Commonly known as:  MAXZIDE-25/DYAZIDE            Allergies  Allergies   Allergen Reactions   • Codeine Rash     Rash       DIET  Orders Placed This Encounter   Procedures   • Diet Order Regular     Standing Status:   Standing     Number of Occurrences:   1     Order Specific Question:   Diet:     Answer:   Regular [1]     Order Specific Question:   Texture/Fiber modifications:     Answer:   Dysphagia 2(Pureed/Chopped)specify fluid consistency(question 6) [2]     Order Specific Question:   Consistency/Fluid modifications:     Answer:   Nectar Thick [2]     Order Specific Question:   Miscellaneous modifications:     Answer:   SLP - 1:1 Supervision by Nursing [21]     Order Specific Question:   Miscellaneous modifications:     Answer:   SLP - Deliver to Nursing Station [22]       ACTIVITY  As per skilled facility    LINES, DRAINS, AND WOUNDS  This is an automated list. Peripheral IVs will be removed prior to discharge.  PIV Group Right Antecubital 18g Flexible Catheter (Active)   Line Secured Taped;Transparent  8/14/2018  8:00 AM   Site Condition / Description Assessed;Patent;Clean;Dry;Intact 8/14/2018  8:00 AM   Dressing Type / Description Transparent;Clean;Dry;Intact 8/14/2018  8:00 AM   Dressing Status Observed 8/14/2018  8:00 AM   Saline Locked Yes 8/14/2018  8:00 AM   Infiltration Grading Used by [facility] and [facility] [place] [facility] 0 8/14/2018  8:00 AM   Phlebitis Scale (Used by [facility]) 0 8/14/2018  8:00 AM   Date Primary Tubing Changed 08/11/18 8/11/2018 10:00 PM   NEXT Primary Tubing Change  08/14/18 8/11/2018 10:00 PM                     MENTAL STATUS ON TRANSFER  Level of Consciousness: Alert  Orientation : Unable to Assess  Speech: Expressive Aphasia    CONSULTATIONS  Neurology   Cardiology    PROCEDURES  Ct-head W/o    Result Date: 8/13/2018 8/13/2018 12:05 AM HISTORY/REASON FOR EXAM:  Left MCA hemorrhagic infarct. TECHNIQUE/EXAM DESCRIPTION AND NUMBER OF VIEWS: CT of the head without contrast. The study was performed on a helical multidetector CT scanner. Contiguous 2.5 mm axial sections were obtained from the skull base through the vertex. Up to date radiation dose reduction adjustments have been utilized to meet ALARA standards for radiation dose reduction. COMPARISON:  8/12/2018 FINDINGS: There is subacute infarct in the left frontal lobe. Hemorrhagic component is not well appreciated in this study. Nonspecific T2 low densities are noted throughout the white matter. Mild cerebral volume loss is seen. The ventricles, cortical sulci and the  basal cisterns are prominent consistent with mild cerebral volume loss. There is no extra-axial fluid collection, hemorrhage or mass. The skull bones appear normal. The paranasal sinuses are clear. The extracranial soft tissue including orbits appear grossly normal. Paranasal sinuses in the field of view are unremarkable. Mastoids in the field of view are unremarkable.     1.  Subacute infarct in the left frontal lobe. When compared with the previous MRI dated 8/12/2018 the hemorrhagic  component is not well appreciated. 2.  Moderate chronic microvascular ischemic disease. 3.  Age-appropriate cerebral atrophy.    Mr-brain-w/o    Result Date: 8/12/2018 8/12/2018 8:29 AM HISTORY/REASON FOR EXAM:  Expressive aphasia and facial droop. TECHNIQUE/EXAM DESCRIPTION: MRI of the brain without contrast. T1 sagittal, T2 fast spin-echo axial, T1 coronal, FLAIR coronal, FLAIR axial, Diffusion weighted and Apparent Diffusion Coefficient (ADC map) axial images were obtained of the whole brain. The study was performed on a [employer] Signa 1.5 Khloe MRI scanner. COMPARISON:  Outside head CT 8/11/2018 FINDINGS: There is a small area of restricted diffusion with corresponding cytotoxic edema in the high LEFT frontal cortex. There is some associated gyriform blooming on the gradient sequence. No intrinsically high T1 signal is present. There is a small area of encephalomalacia in the high RIGHT frontal cortex. There are confluent areas of T2 prolongation in the deep and periventricular white matter which are nonspecific but which most likely reflect areas of chronic microangiopathic ischemic change, though this can also be seen with gliosis and demyelinating processes. There is diffuse prominence of the CSF containing spaces. The calvariae are unremarkable.  There are no extra-axial fluid collections.  The ventricular system and basal cisterns are within normal limits.  There are no mass effects or shift of midline structures.  There are no hemorrhagic lesions.  The diffusion  weighted axial images show no evidence of acute cerebral infarction. The brainstem and posterior fossa structures are unremarkable. Vascular flow voids in the vertebrobasilar and carotid arteries, [place], and dural venous sinuses are intact. The paranasal sinuses and mastoids in the field of view are unremarkable.     1.  Small area of acute or subacute ischemia in the high LEFT frontal cortex with petechial hemorrhage 2.  Remote RIGHT  frontal cortical ischemic injury 3.  Mild atrophy 4.  Advanced white matter changes    Mr-mra Head-w/o    Result Date: 2018 6:22 PM HISTORY/REASON FOR EXAM:  Aphasia and confusion TECHNIQUE/EXAM DESCRIPTION AND NUMBER OF VIEWS:  MRA of the [place] without contrast. The study was performed on a [employer] Signa 1.5 Khloe MRI scanner.  MRA of the [place] and the vertebrobasilar junction was performed with 3D time-of-flight technique. COMPARISON: None FINDINGS: Motion artifact degrades the examination and limits evaluation. MRA of the [place] shows no occlusive disease in the central vessels of the anterior circulation.  The posterior circulation shows the vertebrobasilar confluence to be intact.  There is no aneurysm or occlusive lesion.     Severely motion degraded exam shows no evidence of large vessel occlusion. Aneurysm is not excluded by this exam. Consider repeating when the patient may be better able to remain still.    Echocardiogram Comp W/o Cont    Result Date: 2018  Transthoracic Echo Report Echocardiography Laboratory CONCLUSIONS No prior study is available for comparison. Left ventricular ejection fraction is visually estimated to be 65%. Grade II diastolic dysfunction. Moderately dilated left atrium. Estimated right ventricular systolic pressure  is 55 mmHg. FREDDY COTTON Exam Date:         2018                    11:37 Exam Location:     Inpatient Priority:          Routine Ordering Physician:        LEELEE GUTIÉRREZ Referring Physician:       [id number] Lovell Sonographer:               Josi Erickson RDCS Age:    78     Gender:    F MRN:    [id number] :    1940 BSA:    1.67   Ht (in):    65     Wt (lb):    135 Exam Type:     Complete Indications:     TIA ICD Codes:       435.9 CPT Codes:       04678 BP:   119    /   55     HR: Technical Quality:       Fair MEASUREMENTS  (Male / Female)  Normal Values 2D ECHO LV Diastolic Diameter PLAX        4.7 cm                4.2 - 5.9 / 3.9 - 5.3 cm LV Systolic Diameter PLAX         2.6 cm                2.1 - 4.0 cm IVS Diastolic Thickness           0.88 cm               LVPW Diastolic Thickness          0.98 cm               LVOT Diameter                     2 cm                  Estimated LV Ejection Fraction    65 %                  LV Ejection Fraction MOD BP       73.5 %                >= 55  % LV Ejection Fraction MOD 4C       67.1 %                LV Ejection Fraction MOD 2C       78.5 %                DOPPLER AV Peak Velocity                  1.6 m/s               AV Peak Gradient                  10.2 mmHg             AV Mean Gradient                  5.5 mmHg              LVOT Peak Velocity                1.4 m/s               AV Area Cont Eq vti               2.9 cm²               Mitral E Point Velocity           0.95 m/s              Mitral E to A Ratio               1.4                   MV Pressure Half Time             87.9 ms               MV Area PHT                       2.5 cm²               MV Deceleration Time              303 ms                TR Peak Velocity                  345 cm/s              PV Peak Velocity                  1 m/s                 PV Peak Gradient                  4.2 mmHg              RVOT Peak Velocity                0.81 m/s              * Indicates values subject to auto-interpretation LV EF:  65    % FINDINGS Left Ventricle Normal left ventricular size, wall thickness, and systolic function.  Left ventricular ejection fraction is visually estimated to be 65%.  Grade II diastolic dysfunction. Right Ventricle Normal right ventricular size and systolic function. Right Atrium Enlarged right atrium. Left Atrium Moderately dilated left atrium. Left atrial volume index is 44 mL/sq m. Mitral Valve Structurally normal mitral valve. Mild mitral regurgitation. Aortic Valve Aortic sclerosis without stenosis. No aortic  insufficiency. Tricuspid Valve Structurally normal tricuspid valve. Moderate tricuspid regurgitation. Estimated right ventricular systolic pressure  is 55 mmHg. Pulmonic Valve Structurally normal pulmonic valve. Trace pulmonic insufficiency. Pericardium Normal pericardium without effusion. Aorta Ascending aorta not well visualized. Magno Stratton (Electronically Signed) Final Date:     2018                 15:02    Carotid Duplex    Result Date: 2018   Carotid Duplex  Report  Vascular Laboratory  CONCLUSIONS  <50% bilateral ICA stenoses  mild to moderate right subclavian stenosis  nl vertebrals  nl left subclavian  COTTON FREDDY  Exam Date:     2018 08:53  Room #:     Inpatient  Priority:     Routine  Ht (in):             Wt (lb):  Ordering Physician:        LONA FLOYD  Referring Physician:  Sonographer:               Raeann Varela RVT  Study Type:                Complete Bilateral  Technical Quality:         Adequate  Age:    78    Gender:     F  MRN:    [id number]  :    1940      BSA:  Indications:     Aphasia following unspecified cerebrovascular disease  CPT Codes:       67132  ICD Codes:       S06266  History:         Expressive aphasia, facial drooping; No previous duplex on                   file.  Limitations:  Right Brachial BP:              /  Left Brachial BP:             /  RIGHT  Plaque          Plaque         Velocity (cm/s)  Characteristics Composition    Syst/Diast                                 80   / 21      Distal ICA                                 69   / 24      Mid ICA                                 66   / 14      Prox ICA  Irregular      Heterogeneo     98   / 21      Distal CCA                 us                                 102  / 20      Mid CCA                                 61   / 11      Prox CCA                                 97   / 8       ECA  Waveform:   Triphasic          274            SCA  LEFT  Plaque          Plaque          Velocity (cm/s)  CharacteristicsComposition     Syst/Diast                                 115  / 36      Distal ICA                                 95   / 28      Mid ICA  Irregular      Heterogeneo     88   / 13      Prox ICA                 us  Irregular      Heterogeneo     95   / 18      Distal CCA                 us                                 90   / 18      Mid CCA                                 90   / 19      Prox CCA                                 118  / 10      ECA  Waveform:   Triphasic          167            SCA          0.8          ICA/CCA       1.2        Antegrade      Vertebral   Antegrade         75   / 17     cm/s        75    / 17  FINDINGS  Right carotid.  Very mild plaque of the carotid bifurcation. Doppler velocities are normal.  Elevated subclavian artery velocities when compared to the contralateral  side.  Antegrade right vertebral flow.  Left carotid.  Plaque of the bifurcation extending into the internal carotid. Velocities  are consistent with < 50% stenosis of the internal carotid artery.  Subclavian and vertebral artery waveforms are antegrade and waveforms are  normal in character and velocity.  Yohannes López MD  (Electronically Signed)  Final Date:      13 August 2018                   06:08      LABORATORY  Lab Results   Component Value Date    SODIUM 145 08/13/2018    POTASSIUM 3.8 08/13/2018    CHLORIDE 115 (H) 08/13/2018    CO2 26 08/13/2018    GLUCOSE 88 08/13/2018    BUN 14 08/13/2018    CREATININE 0.92 08/13/2018        Lab Results   Component Value Date    WBC 9.5 08/14/2018    HEMOGLOBIN 10.5 (L) 08/14/2018    HEMATOCRIT 33.4 (L) 08/14/2018    PLATELETCT 229 08/14/2018        Total time of the discharge process exceeds 40  minutes.

## 2018-08-14 NOTE — DISCHARGE PLANNING
TCN spoke with patient daughter Arpita to discuss the care teams recommendation for post acute services of Acute rehab vs SNF. Daughter is agreeable to suggestion. TCN following to assist with DC planning.

## 2018-08-14 NOTE — THERAPY
"Physical Therapy Treatment completed.   Bed Mobility:  Supine to Sit: Supervised  Transfers: Sit to Stand: Stand by Assist (from various height surfaces)  Gait: Level Of Assist: Contact Guard Assist with no AD. Pt would benefit from FWW for longer distances     Plan of Care: Will benefit from Physical Therapy 3 times per week  Discharge Recommendations: Equipment: Will Continue to Assess for Equipment Needs. Pt may d/c home if family can provide 24/7 supervision.     See \"Rehab Therapy-Acute\" Patient Summary Report for complete documentation.       "

## 2018-08-14 NOTE — PREADMISSION SCREENING NOTE
Pre-Admission Screening Form    Patient Information:   Name: Johana Weston     MRN: [id number]       : 1940      Age: 78 y.o.   Gender: female      Race: White [7]       Marital Status:  [4]  Family Contact: Sample,Henry Silver        Relationship: Daughter [2]  Son [15]  Home Phone:              Cell Phone: [phone]  Advanced Directives: None  Code Status:  FULL  Current Attending Provider: Garett Borja M.D.  Referring Physician: Dr. Stallings   Physiatrist Consult: Dr. Abbasi    Referral Date: 18  Primary Payor Source:  MEDICARE  Secondary Payor Source:  [facility]    Medical Information:   Date of Admission to Acute Care [place]2018  Room Number: S195/01  Rehabilitation Diagnosis: 01.2 (R) Body Involvement (L) Brain     There is no immunization history on file for this patient.  Allergies   Allergen Reactions   • Codeine Rash     Rash     Past Medical History:   Diagnosis Date   • Anemia    • Hypertension    • Leucocytosis    • Renal disorder      History reviewed. No pertinent surgical history.    History Leading to Admission, Conditions that Caused the Need for Rehab (CMS):     Ramila Chan M.D. Physician Signed Hospital Medicine  H&P Date of Service: 2018  9:19 PM         []Hide copied text  []Jane for attribution information  Hospital Medicine History & Physical Note     Date of Service  2018     Primary Care Physician  No primary care provider on file.     Consultants  None     Code Status  Full     Chief Complaint  Aphasia     History of Presenting Illness  History, review of systems, and physical exam are all limited due to aphasia.  No family members at bedside.  Information obtained from medical records.  78 y.o. female with past medical history of hypertension, hyperlipidemia, and hypothyroidism who was witnessed to be in her usual state of health 2 days ago by her family members.  However, on the day of admission around 7:30 AM 1 of the  family members noted that the patient is unable to talk.  Patient was taken to outside facility where CT of the brain was done and showed small focus of decreased attenuation in the right frontal lobe area that is consistent with lacunar infarct.  No hemorrhage noted.  CTA of the brain was unremarkable.  Patient labs were remarkable for WBCs of 13.4, and creatinine of 1.6.  Patient was transferred here for higher level of care.     Review of Systems  Review of Systems   Unable to perform ROS: Patient nonverbal         Past Medical History  Hypertension  Hyperlipidemia  Hypothyroidism     Surgical History  Unknown past surgical history     Family History  Unknown family history.     Social History  Unknown social history     Allergies        Allergies   Allergen Reactions   • Codeine Rash       Rash         Medications  None         Physical Exam  Blood Pressure : 137/51   Temperature: 37.1 °C (98.7 °F)   Pulse: (!) 57   Respiration: 16   Pulse Oximetry: 94 %      Physical Exam   Constitutional: She appears well-developed. No distress.   HENT:   Head: Normocephalic and atraumatic.   Eyes: Left eye exhibits no discharge. No scleral icterus.   Neck: Neck supple. No JVD present. No tracheal deviation present. No thyromegaly present.   Cardiovascular: Normal rate and regular rhythm.  Exam reveals no friction rub.    Pulmonary/Chest: Breath sounds normal. No respiratory distress. She has no wheezes. She has no rales.   Abdominal: Bowel sounds are normal. She exhibits no distension. There is no tenderness.   Musculoskeletal: She exhibits no tenderness or deformity.   Neurological:   Patient is able to follow commands.  Has expressive aphasia.  No weakness noted on either upper or lower extremities.  Reflexes symmetrical bilaterally.   Skin: Skin is dry. No rash noted. No erythema.   Psychiatric:   Not assessable         Laboratory:      Recent Labs      08/12/18   0050   WBC  11.5*   RBC  3.56*   HEMOGLOBIN  10.5*      HEMATOCRIT  33.1*   MCV  93.0   MCH  29.5   MCHC  31.7*   RDW  51.8*   PLATELETCT  257   MPV  10.3          No results for input(s): ALTSGPT, ASTSGOT, ALKPHOSPHAT, TBILIRUBIN, DBILIRUBIN, GAMMAGT, AMYLASE, LIPASE, ALB, PREALBUMIN, GLUCOSE in the last 72 hours.              No results found for: TROPONINI     Urinalysis:    No results found      Imaging:  Echocardiogram Comp W/O cont    (Results Pending)   Carotid Duplex ([facility] and Rehab Only) - Do not order if CTA - Neck done in ER    (Results Pending)   MR-BRAIN-W/O    (Results Pending)            Assessment/Plan:  I anticipate this patient will require at least two midnights for appropriate medical management, necessitating inpatient admission.         * Stroke (cerebrum) (HCC)- (present on admission)   Assessment & Plan     Not candidate for TPA due to unknown duration of symptoms.  Patient has expressive aphasia with facial drooping.  CT scan of the brain showed small focus of decreased attenuation in the right frontal lobe consistent with lacunar infarct.  No hemorrhage noted.  CTA of the brain was unremarkable.  Will check MRI of the brain.  Carotid Doppler.  Echocardiogram.  Start patient on aspirin and statin.  Will check lipid panel.  PT/OT/speech.  Fall precautions.          Acute kidney injury (HCC)- (present on admission)   Assessment & Plan     Acute versus acute on chronic kidney injury.  Unknown creatinine baseline.  Avoid nephrotoxins.  Renally dose all medications.          Hypothyroidism- (present on admission)   Assessment & Plan     Continue home dose Synthroid.          Hypertension- (present on admission)   Assessment & Plan     Allow permissive hypertension for the first 72 hours.          Normocytic anemia- (present on admission)   Assessment & Plan     Monitor H&H.          Leukocytosis- (present on admission)   Assessment & Plan     Could be stress-induced.  Will check UA.  Will also check pro-calcitonin.                VTE  prophylaxis: Heparin        Melquiades Abbasi M.D. Physician Signed Physical Medicine & Rehab  Consults Date of Service: 8/14/2018 12:35 PM   Consult Orders:   IP CONSULT FOR PHYSIATRY [[id number]] ordered by Van Stallings M.D. at 08/14/18 1217      Expand All Collapse All    []Hide copied text  []Hover for attribution information  Medical chart review completed.      Patient is a 78 y.o. year-old female with a past medical history significant for Anemia, HTN, Hypothyroidism, and CKD? admitted to [facility] on 8/11/2018  7:55 PM with expression aphasia.  Patient was taken to outside hospital where CT head showed a small area of acute lacunar infarct in the right frontal lobe.  Unknown length of time since onset so patient was not a tPA candidate. CTA was reportedly unremarkable.  Patient was started on ASA and plavix.  Per neurology consult NIHSS of 9 with right drift.  MRI brain showed left frontal acute infarct with petechial hemorrhage as well as remote right frontal cortical injury.  Per neurology concern for A fib and may need Loop recorder.  Echo with an EF of 65 on 8/12/18. Repeat CT head on 8/13/18 with subacute infarct in the left frontal lobe without appreciate petechial hemorrhages. Interventional cardiology was did not recommend implantable loop recorder as A fib had not been documented on telemetry.  Since that time A fib has been seen on telemetry and recommending starting AC in 2 weeks.      Per report patient previously living in senior living facility with family members who check in on her.  Patient was last assessed by PT on 8/14/18 and was SBA for transfer and CGA without assistive device (FWW without assist) with recommendation of home with 24/7 supervision. Patient was last evaluated by SLP on 8/14/18 and was recommending FEES for swallow evaluation with dysphagia 1 diet with NTL.   Patient was last evaluated by OT On 8/12/18 and was CGA for ADLs.      PMH:  Past Medical  History        Past Medical History:   Diagnosis Date   • Anemia     • Hypertension     • Leucocytosis     • Renal disorder              PSH:  Past Surgical History   History reviewed. No pertinent surgical history.        FAMILY HISTORY:  Family History   History reviewed. No pertinent family history.        MEDICATIONS:       Current Facility-Administered Medications   Medication Dose   • LORazepam (ATIVAN) injection 1 mg  1 mg   • metoprolol SR (TOPROL XL) tablet 25 mg  25 mg   • aspirin (ASA) chewable tab 81 mg  81 mg   • nicotine (NICODERM) 7 MG/24HR 7 mg  7 mg   • heparin injection 5,000 Units  5,000 Units   • labetalol (NORMODYNE,TRANDATE) injection 10 mg  10 mg     Or   • hydrALAZINE (APRESOLINE) injection 10 mg  10 mg   • atorvastatin (LIPITOR) tablet 40 mg  40 mg   • senna-docusate (PERICOLACE or SENOKOT S) 8.6-50 MG per tablet 2 Tab  2 Tab     And   • polyethylene glycol/lytes (MIRALAX) PACKET 1 Packet  1 Packet     And   • magnesium hydroxide (MILK OF MAGNESIA) suspension 30 mL  30 mL     And   • bisacodyl (DULCOLAX) suppository 10 mg  10 mg   • ondansetron (ZOFRAN) syringe/vial injection 4 mg  4 mg   • ondansetron (ZOFRAN ODT) dispertab 4 mg  4 mg         ALLERGIES:  Codeine     PSYCHOSOCIAL HISTORY:  Living Site:  Assisted living residence  Living With:  self  Caregiver's availability:  Not Applicable  Number of stairs:  Not Applicable  Substance use history:  Smoking        The patient presents  with cognitive deficits and functional deficits in mobility/self-cares/swallowing/speech, and Moderate  de-conditioning. Pre-morbidly, this patient lived in a single level home with None steps to enter,alone and able to care for self  The patient was evaluated by acute care Physical Therapy, Occupational Therapy and Speech Language Pathology; currently requiring contact-guard assistance for mobility and minimal assistance for ADLs, also with ongoing cognitive, speech and swallowing deficits. The patient's  current diet is NPO.         Patient symptoms predominately swallow and cognition.  Last PT note recommending possible home with HH but patient will most likely need 24 hour supervision. Patient may benefit from short stay at inpatient rehabilitation to address diet and ADLs if her family is able to provide 24 hour supervision at discharge.   The patient is   aFair candidate for an acute inpatient rehabilitation program with a coordinated program of care at an intensity and frequency not available at a lower level of care.      Note: This recommendation requires that patient has at least CGA/Minimal Assistance needs in at least two therapy disciplines.  If patient progresses to no longer need CGA/[place] with at least two therapy disciplines they may be more appropriate for Skilled nursing facility versus home with home health.       This recommendation is substantiated by the patient's current medical condition with intervention and assessment of medical issues requiring an acute level of care for patient's safety and maximum outcome. A coordinated program of care will be provided by an interdisciplinary team including physical therapy, occupational therapy, speech language pathology, hospitalist, physiatry, rehab nursing and rehab psychology. Rehab goals include improved cognition, speech and swallowing, mobility, self-care management, strength and conditioning/endurance, pain management, bowel and bladder management, mood and affect, and safety with independent home management including caregiver training. Estimated length of stay is approximately 14 days. Rehab potential: Good. Disposition: to pre-morbid independent living setting with supportive care of patient's family and community resources. We will continue to follow with you in anticipation of discharge to acute inpatient rehabilitation when medically stable to do so at the discretion of the attending physician. Thank you for allowing us to participate in  this patient's care. Please call with any questions regarding this recommendation.     Melquiades Abbasi M.D.           Dr. Casillas consult:  IMPRESSION:  Status post cerebrovascular accident.     Reviewing the chart, there was actually a rhythm strips in the chart   confirming the patient has had PAF.  Her atrial fibrillation, rate is well   controlled, would continue metoprolol that she was taking as an outpatient.    At this time, there is no indication for an implantable loop recorder as   atrial fibrillation has been documented previously on the rhythm strip.    Cardiology will see again should further problems arise.    Dr. Farias consult:  Assessment/Plan:        Acute Left MCA ischemic stroke: in patient with cognitive declining and suspicions of ministrokes in the past.  Embolic source is suspected, as no occlusive disease seeing in the CTA done in OSH.  MRI stat showed Left MCA branch stroke with petechial hemorraghic transformation that does not precludes the continuation of the DVT prophylaxis or ASA  Continue statins.  ECHO pending  Telemetry pending.  AFIB highly suspected, if no found during hospital stay she will need LOOP before discharge  Recommend cardiology consultation now; in preparation     Hemorraghic transformation in the stroke: no symptomatic, recommend continue ASA and the dvt prophylaxis, get CT head tomorrow AM to be certain that there is no bleed signal in the CT      HTN chronic : permissive hypertension for now, stop home HTN medications; cardene only if B/P>220/110  Continue thyroid medication        Yenifer Floyd M.D.  Stroke Director  Clinical Professor, [employer]  Diplomate, Neurology , Vascular Neurology, Neuphysiology    Brain MRI 08-12-18:  1.  Small area of acute or subacute ischemia in the high LEFT frontal cortex with petechial hemorrhage  2.  Remote RIGHT frontal cortical ischemic injury  3.  Mild atrophy  4.  Advanced white matter  "changes    Co-morbidities: See PMH  Potential Risk - Complications: Aphasia, Cognitive Impairment, Contractures, Deep Vein Thrombosis, Dysphagia, Incontinence, Malnutrition, Pain, Perceptual Impairment, Pneumonia, Pressure Ulcer and Urinary Tract Infection  Level of Risk: High    Ongoing Medical Management Needed (Medical/Nursing Needs):   Patient Active Problem List    Diagnosis Date Noted   • Paroxysmal A-fib ([facility]) 08/13/2018     Priority: High   • Stroke (cerebrum) ([facility]) 08/12/2018     Priority: High   • Leukocytosis 08/12/2018   • Normocytic anemia 08/12/2018   • Hypertension 08/12/2018   • Hypothyroidism 08/12/2018   • Acute kidney injury ([facility]) 08/12/2018     A & o with severe expressive aphasia    Current Vital Signs:   Temperature: 37 °C (98.6 °F) Pulse: (!) 55 Respiration: 18 Blood Pressure : 131/57  Weight: 61.3 kg (135 lb 2.3 oz) Height: 165.1 cm (5' 5\")  Pulse Oximetry: 95 % O2 (LPM): 2      Completed Laboratory Reports:  Recent Labs      08/12/18   0050  08/13/18   0435   WBC  11.5*   --    HEMOGLOBIN  10.5*   --    HEMATOCRIT  33.1*   --    PLATELETCT  257   --    SODIUM  143  145   POTASSIUM  3.8  3.8   BUN  27*  14   CREATININE  1.38  0.92   ALBUMIN  3.5   --    GLUCOSE  98  88     Additional Labs: Not Applicable    Prior Living Situation:   Housing / Facility: Unable To Determine At This Time  Lives with - Patient's Self Care Capacity: Adult Children, Alone and Unable to Care For Self  Equipment Owned: Unable to Determine At This Time    Prior Level of Function / Living Situation:   Physical Therapy: Prior Services: None  Housing / Facility: Unable To Determine At This Time  Equipment Owned: Unable to Determine At This Time  Lives with - Patient's Self Care Capacity: Adult Children, Alone and Unable to Care For Self  Bed Mobility: Unable To Determine At This Time  Transfer Status: Unable To Determine At This Time  Ambulation: Unable To Determine At This Time  Current Level of Function:   Level Of " Assist: Contact Guard Assist  Assistive Device: None  Distance (Feet):  (hallway distances >500')  Deviation:  (slight unsteadiness)  Weight Bearing Status: FWB  Skilled Intervention: Postural Facilitation  Comments: Increase in unsteadiness w/fatigue. Pt would benefit from FWW for hallway distances to allow for safety. Pt amb on 2L O2.   Supine to Sit: Supervised  Sit to Supine: Supervised  Scooting: Supervised  Rolling: Supervised  Comments: Did not assess, pt up in the chair upon entry into the room and returned to chair for lunch.   Sit to Stand: Stand by Assist (from various height surfaces)  Bed, Chair, Wheelchair Transfer: Stand by Assist  Transfer Method:  (stand step without device)  Skilled Intervention: Verbal Cuing, Postural Facilitation, Compensatory Strategies  Sitting in Chair: declined chair  Sitting Edge of Bed: 5mins  Standinmins  Occupational Therapy:   Self Feeding: Unable To Determine At This Time  Dressing: Unable To Determine At This Time  Medication Management: Unable To Determine At This Time  Prior Level Of Mobility: Unable to Determine At This Time  Prior Services: None  Housing / Facility: Unable To Determine At This Time  Current Level of Function:   Upper Body Dressing: Supervision  Lower Body Dressing: Contact Guard Assist  Speech Language Pathology:   Assessment : Patient seen for an Aphasia evaluation on this date.  Patient presented with severe to profound expressive deficits.  She only produced minimal spontaneous grunts and brief vocalizations but no spontaneous or purposeful words.  In a writing task, she antonella a poorly formed [place] and wrote simple words to dictation, however, more complex words (greater than 3 letters) resulted in letter additions and substitutions.  Legibility was moderate-severely impaired.  Patient had impaired reading.  Letter identification was inconsistent as well as reading simple words.  She demonstrated increased frustration with reading and speech  tasks.  In the clock drawing task, the patient had number omissions, impaired number sequencing, and incorrect hand placement.  The patient would benefit from continued SLP services to address aphasia and dysphagia during the acute setting as well as intensive therapy during the next level of care.   Problem List: Dysphagia, Aphasia  Diet / Liquid Recommendation: NPO, Pre-Feeding Trials with SLP Only  Comments: Patient seen for reassessment of swallowing function on this date and is currently NPO by RN for concerns of choking on medications.  Patient with improved speech and able to produce some volitional speech.  Presentation of PO included nectars and pudding.  The patient presented with effortful swallow, delayed initiation of swallow trigger up to 4 seconds and noisy swallow.  Reflexive cough x1 with sips of nectars was noted which may be concerning for penetration/aspiration.  Patient attempted but did not produce a volitional cough.  Given expressive aphasia and suspected apraxia, unable to fully assess vocal quality.  Discussed case with MD and received orders for a FEES to further assess pharyngeal swallowing function.    Rehabilitation Prognosis/Potential: Good  Estimated Length of Stay: 10 days    Nursing:   Orientation : Unable to Assess  Continent    Scope/Intensity of Services Recommended:  Physical Therapy: 1 hr / day  5 days / week. Therapeutic Interventions Required: Maximize Endurance, Mobility, Strength and Safety  Occupational Therapy: 1 hr / day 5 days / week. Therapeutic Interventions Required: Maximize Self Care, ADLs, IADLs and Energy Conservation  Speech & Language Pathology: 1 hr / day 5 days / week. Therapeutic Interventions Required: Maximize Cognition, Swallowing and Safety  Rehabilitation Nursin/7. Therapeutic Interventions Required: Monitor Pain, Skin, Vital Signs, Intake and Output, Labs, Safety and Aspiration Risk  Rehabilitation Physician: 3 - 5 days / week. Therapeutic  Interventions Required: Medical Management  Respiratory Care: Pulmonay Toileting. Therapeutic Interventions Required: Pulmonary Toileting, O2 Weaning and Aspiration Risk    Rehabilitation Goals and Plan (Expected frequency & duration of treatment in the IRF):   Return to the Community, Modified Independent Level of Care and Family Able to Provide 24/7 Assistance  Anticipated Date of Rehabilitation Admission: 08-14-18  Patient/Family oriented IRF level of care/facility/plan: Yes  Patient/Family willing to participate in IRF care/facility/plan: Yes  Patient able to tolerate IRF level of care proposed: Yes  Patient has potential to benefit IRF level of care proposed: Yes  Comments: Not Applicable    Special Needs or Precautions - Medical Necessity:  Safety Concerns/Precautions:  Fall Risk / High Risk for Falls, Balance, Cognition and Bed / Chair Alarm  Pain Management  IV Site: Peripheral  Requires Oxygen  Current Medications:    Current Facility-Administered Medications Ordered in Epic   Medication Dose Route Frequency Provider Last Rate Last Dose   • metoprolol SR (TOPROL XL) tablet 25 mg  25 mg Oral Q DAY Mainor Casillas M.D.   25 mg at 08/14/18 0614   • aspirin (ASA) chewable tab 81 mg  81 mg Oral DAILY Yenifer Floyd M.D.   81 mg at 08/14/18 0614   • nicotine (NICODERM) 7 MG/24HR 7 mg  7 mg Transdermal Daily-0600 Van Stallings M.D.   7 mg at 08/14/18 0614   • heparin injection 5,000 Units  5,000 Units Subcutaneous Q12HRS Van Stallings M.D.   5,000 Units at 08/14/18 0614   • labetalol (NORMODYNE,TRANDATE) injection 10 mg  10 mg Intravenous Q4HRS PRN Ramila Chan M.D.        Or   • hydrALAZINE (APRESOLINE) injection 10 mg  10 mg Intravenous Q2HRS PRN Ramila Chan M.D.       • atorvastatin (LIPITOR) tablet 40 mg  40 mg Oral Q EVENING Ramila Chan M.D.   40 mg at 08/13/18 1732   • senna-docusate (PERICOLACE or SENOKOT S) 8.6-50 MG per tablet 2 Tab  2 Tab Oral BID Ramila Chan  M.D.   2 Tab at 08/14/18 0615    And   • polyethylene glycol/lytes (MIRALAX) PACKET 1 Packet  1 Packet Oral QDAY PRELMER Chan M.D.        And   • magnesium hydroxide (MILK OF MAGNESIA) suspension 30 mL  30 mL Oral QDAY JOSEFINA Chan M.D.        And   • bisacodyl (DULCOLAX) suppository 10 mg  10 mg Rectal QDAY JOSEFINA Chan M.D.       • ondansetron (ZOFRAN) syringe/vial injection 4 mg  4 mg Intravenous Q4HRS JOSEFINA Chan M.D.       • ondansetron (ZOFRAN ODT) dispertab 4 mg  4 mg Oral Q4HRS PRELMER Chan M.D.         No current [facility]-ordered outpatient prescriptions on file.     Diet:   DIET ORDERS (Through next 24h)    Start     Ordered    08/14/18 0732  DIET NPO  ALL MEALS     Question:  Restrict to:  Answer:  Strict    08/14/18 0732          Anticipated Discharge Destination / Patient/Family Goal:  Destination: Home with Assistance Support System: Adult kids and grandsons  Anticipated home health services: OT, PT and SLP  Previously used HH service/ provider: Not Applicable  Anticipated DME Needs: Oxygen, Walker and Life Line  Outpatient Services: OT, PT and SLP  Alternative resources to address additional identified needs:     Pre-Screen Completed: 8/14/2018 1:21 PM Kedar Pacheco L.P.N.

## 2018-08-14 NOTE — THERAPY
"Speech Language Therapy dysphagia treatment completed.   Functional Status:  Patient seen for reassessment of swallowing function on this date and is currently NPO by RN for concerns of choking on medications.  Patient with improved speech and able to produce some volitional speech.  Presentation of PO included nectars and pudding.  The patient presented with effortful swallow, delayed initiation of swallow trigger up to 4 seconds and noisy swallow.  Reflexive cough x1 with sips of nectars was noted which may be concerning for penetration/aspiration.  Patient attempted but did not produce a volitional cough.   Recommendations: Given expressive aphasia and suspected apraxia, unable to fully assess vocal quality.  Discussed case with MD and received orders for a FEES to further assess pharyngeal swallowing function.     Plan of Care: Will benefit from Speech Therapy 5 times per week  Post-Acute Therapy: Discharge to a transitional care facility for continued skilled therapy services.    See \"Rehab Therapy-Acute\" Patient Summary Report for complete documentation.     "

## 2018-08-14 NOTE — CONSULTS
Medical chart review completed.     Patient is a 78 y.o. year-old female with a past medical history significant for Anemia, HTN, Hypothyroidism, and CKD? admitted to [facility] on 8/11/2018  7:55 PM with expression aphasia.  Patient was taken to outside hospital where CT head showed a small area of acute lacunar infarct in the right frontal lobe.  Unknown length of time since onset so patient was not a tPA candidate. CTA was reportedly unremarkable.  Patient was started on ASA and plavix.  Per neurology consult NIHSS of 9 with right drift.  MRI brain showed left frontal acute infarct with petechial hemorrhage as well as remote right frontal cortical injury.  Per neurology concern for A fib and may need Loop recorder.  Echo with an EF of 65 on 8/12/18. Repeat CT head on 8/13/18 with subacute infarct in the left frontal lobe without appreciate petechial hemorrhages. Interventional cardiology was did not recommend implantable loop recorder as A fib had not been documented on telemetry.  Since that time A fib has been seen on telemetry and recommending starting AC in 2 weeks.     Per report patient previously living in senior living facility with family members who check in on her.  Patient was last assessed by PT on 8/14/18 and was SBA for transfer and CGA without assistive device (FWW without assist) with recommendation of home with 24/7 supervision. Patient was last evaluated by SLP on 8/14/18 and was recommending FEES for swallow evaluation with dysphagia 1 diet with NTL.   Patient was last evaluated by OT On 8/12/18 and was CGA for ADLs.     PMH:  Past Medical History:   Diagnosis Date   • Anemia    • Hypertension    • Leucocytosis    • Renal disorder        PSH:  History reviewed. No pertinent surgical history.    FAMILY HISTORY:  History reviewed. No pertinent family history.    MEDICATIONS:  Current Facility-Administered Medications   Medication Dose   • LORazepam (ATIVAN) injection 1 mg  1 mg   •  metoprolol SR (TOPROL XL) tablet 25 mg  25 mg   • aspirin (ASA) chewable tab 81 mg  81 mg   • nicotine (NICODERM) 7 MG/24HR 7 mg  7 mg   • heparin injection 5,000 Units  5,000 Units   • labetalol (NORMODYNE,TRANDATE) injection 10 mg  10 mg    Or   • hydrALAZINE (APRESOLINE) injection 10 mg  10 mg   • atorvastatin (LIPITOR) tablet 40 mg  40 mg   • senna-docusate (PERICOLACE or SENOKOT S) 8.6-50 MG per tablet 2 Tab  2 Tab    And   • polyethylene glycol/lytes (MIRALAX) PACKET 1 Packet  1 Packet    And   • magnesium hydroxide (MILK OF MAGNESIA) suspension 30 mL  30 mL    And   • bisacodyl (DULCOLAX) suppository 10 mg  10 mg   • ondansetron (ZOFRAN) syringe/vial injection 4 mg  4 mg   • ondansetron (ZOFRAN ODT) dispertab 4 mg  4 mg       ALLERGIES:  Codeine    PSYCHOSOCIAL HISTORY:  Living Site:  Assisted living residence  Living With:  self  Caregiver's availability:  Not Applicable  Number of stairs:  Not Applicable  Substance use history:  Smoking      The patient presents  with cognitive deficits and functional deficits in mobility/self-cares/swallowing/speech, and Moderate  de-conditioning. Pre-morbidly, this patient lived in a single level home with None steps to enter,alone and able to care for self  The patient was evaluated by acute care Physical Therapy, Occupational Therapy and Speech Language Pathology; currently requiring contact-guard assistance for mobility and minimal assistance for ADLs, also with ongoing cognitive, speech and swallowing deficits. The patient's current diet is NPO.       Patient symptoms predominately swallow and cognition.  Last PT note recommending possible home with HH but patient will most likely need 24 hour supervision. Patient may benefit from short stay at inpatient rehabilitation to address diet and ADLs if her family is able to provide 24 hour supervision at discharge.   The patient is   aFair candidate for an acute inpatient rehabilitation program with a coordinated program of  care at an intensity and frequency not available at a lower level of care.     Note: This recommendation requires that patient has at least CGA/Minimal Assistance needs in at least two therapy disciplines.  If patient progresses to no longer need CGA/[place] with at least two therapy disciplines they may be more appropriate for Skilled nursing facility versus home with home health.      This recommendation is substantiated by the patient's current medical condition with intervention and assessment of medical issues requiring an acute level of care for patient's safety and maximum outcome. A coordinated program of care will be provided by an interdisciplinary team including physical therapy, occupational therapy, speech language pathology, hospitalist, physiatry, rehab nursing and rehab psychology. Rehab goals include improved cognition, speech and swallowing, mobility, self-care management, strength and conditioning/endurance, pain management, bowel and bladder management, mood and affect, and safety with independent home management including caregiver training. Estimated length of stay is approximately 14 days. Rehab potential: Good. Disposition: to pre-morbid independent living setting with supportive care of patient's family and community resources. We will continue to follow with you in anticipation of discharge to acute inpatient rehabilitation when medically stable to do so at the discretion of the attending physician. Thank you for allowing us to participate in this patient's care. Please call with any questions regarding this recommendation.    Melquiades Abbasi M.D.

## 2018-08-14 NOTE — PROGRESS NOTES
[facility] Hospitalist Progress Note    Date of Service: 2018    Chief Complaint  78 y.o. female with past medical history of hypertension, hyperlipidemia, and hypothyroidism who was noticed by family to have walking difficulty, OSH CT of the brain was done and showed small focus of decreased attenuation in the right frontal lobe area that is consistent with lacunar infarct.  No hemorrhage noted.  CTA of the brain was unremarkable.    Interval Problem Update  . Minimally verbal but pleasant today, not agitated.     Consultants/Specialty  Neurology    Disposition  Continued therapies, discharge planning        Review of Systems   Unable to perform ROS: Mental acuity      Physical Exam  Laboratory/Imaging   Hemodynamics  Temp (24hrs), Av.8 °C (98.3 °F), Min:36.7 °C (98.1 °F), Max:37 °C (98.6 °F)   Temperature: 37 °C (98.6 °F)  Pulse  Av.5  Min: 55  Max: 96    Blood Pressure : 131/57      Respiratory      Respiration: 18, Pulse Oximetry: 95 %        RUL Breath Sounds: Clear, RML Breath Sounds: Clear, RLL Breath Sounds: Diminished, JOVI Breath Sounds: Diminished, LLL Breath Sounds: Clear    Fluids  No intake or output data in the 24 hours ending 18 1620    Nutrition  Orders Placed This Encounter   Procedures   • DIET NPO     Standing Status:   Standing     Number of Occurrences:   1     Order Specific Question:   Restrict to:     Answer:   Strict [1]     Physical Exam   Constitutional:   Elderly, frail   HENT:   Head: Normocephalic and atraumatic.   Mouth/Throat: No oropharyngeal exudate.   Eyes: Conjunctivae and EOM are normal. No scleral icterus.   Neck: Normal range of motion. Neck supple.   Cardiovascular: Normal rate and normal heart sounds.    Pulmonary/Chest: Effort normal. She has no wheezes.   Abdominal: Soft. She exhibits no distension.   Neurological:   Moderate aphasia  Generalized weakness  Cognitive deficit   Skin: Skin is warm. No rash noted. She is not diaphoretic.   Psychiatric:    Pleasant   Nursing note and vitals reviewed.      Recent Labs      08/12/18   0050  08/14/18   1554   WBC  11.5*  9.5   RBC  3.56*  3.53*   HEMOGLOBIN  10.5*  10.5*   HEMATOCRIT  33.1*  33.4*   MCV  93.0  94.6   MCH  29.5  29.7   MCHC  31.7*  31.4*   RDW  51.8*  51.9*   PLATELETCT  257  229   MPV  10.3  10.4     Recent Labs      08/12/18   0050  08/13/18   0435   SODIUM  143  145   POTASSIUM  3.8  3.8   CHLORIDE  112  115*   CO2  24  26   GLUCOSE  98  88   BUN  27*  14   CREATININE  1.38  0.92   CALCIUM  8.6  8.5             Recent Labs      08/12/18   0050   TRIGLYCERIDE  67   HDL  39*   LDL  42          Assessment/Plan     * Stroke (cerebrum) (HCC)- (present on admission)   Assessment & Plan    CT of the brain was done and showed small focus of decreased attenuation in the right frontal lobe area that is consistent with lacunar infarct.  No hemorrhage noted.  CTA of the brain was unremarkable. She was deemed outside of the tPA window. She remained globally aphasic. She was transferred here for Neurology service. Repeat CT head showed subtle infarct left frontal lobe. Carotid Dopplers showed <50% bilateral carotid stenosis. MRA head showed no large vessel occlusion although there was a lot of motion artifact. MRA neck PENDING. Echo normal EF, no significant valvular disease. Cardiology consulted. Rhythm strips were reviewed, there was the possibility of paroxysmal atrial fibrillation. Cardiology felt there is no indication for an implantable loop recorder. Neurology felt chronic anticoagulation can be started 2 weeks after discharge given that he had small petechial hemorrhages and were fine with aspirin and statin for now (see their note).        Paroxysmal A-fib (HCC)   Assessment & Plan    Seen on tele  Should start AC 2 weeks out unless another contraindication comes up  For now continue ASA+statin        Acute kidney injury (HCC)- (present on admission)   Assessment & Plan    Acute versus acute on chronic  kidney injury, although this is likely baseline. Monitor.         Hypothyroidism- (present on admission)   Assessment & Plan    Continue home dose Synthroid.        Hypertension- (present on admission)   Assessment & Plan    Well controlled, continue BB        Normocytic anemia- (present on admission)   Assessment & Plan    No e/o acute bleeding, monitor.         Leukocytosis- (present on admission)   Assessment & Plan    Suspect stress-induced/reactive.  UA negative.         I spent 38 minutes, reviewing the chart, notes, vitals, labs, imaging, ordering labs, evaluating Johana Weston for assessment, enacting the plan above. Coordinating care including review of records, pertinent lab data and studies, as well as discussing diagnostic evaluation and work up, planned therapeutic interventions and future disposition of care. Discussed with RNRAHEEL. Time was devoted to counseling and coordinating care including review of records, pertinent lab data and studies, as well as discussing diagnostic evaluation and work up, planned therapeutic interventions and future disposition of care. Where indicated, the assessment and plan reflect discussion of patient with consultants, other healthcare providers, family members, and additional research needed to obtain further information in formulating the plan of care for Johana Weston. This time includes no procedures or overlap with other providers.      Quality-Core Measures   Reviewed items::  Labs reviewed, Medications reviewed and Radiology images reviewed  Petersen catheter::  No Petersen

## 2018-08-14 NOTE — ASSESSMENT & PLAN NOTE
Seen on tele  Should start AC 2 weeks out unless another contraindication comes up  For now continue ASA+statin

## 2018-08-14 NOTE — DISCHARGE PLANNING
PMR order received from Dr. Stallings.  MCR/[employer]ARP is shown for her medical provider.  DX: L CVA.  PLOF: alone ind in an apartment.  Arpita, daughter, is able to assist as needed along with her  and 2 sons.  Case is under review by Dr. Abbasi.  I do appreciate the referral.

## 2018-08-14 NOTE — PROGRESS NOTES
"Assumed care of patient at 0700.  Report received at bedside.  Patient is A&O x 4, severe expressive aphasia with some improvement over the last 24 hours.  Able to answer \"yes\" or \"no\" and give short statements.  Patient made NPO this AM due to difficulty with morning med pass.  Patient instructed on use of incentive spirometer.  "

## 2018-08-14 NOTE — DISCHARGE PLANNING
Agency/Facility Name: [facility]  Spoke To: Roge (Admission)  Outcome:Attempted to follow up, however, no answer, Left Voicemail for Roge in Admissions

## 2018-08-14 NOTE — PROGRESS NOTES
Monitor Summary: SB-SR 54-60, KS .16, QRS .10, QT .36 with PAC and 1.4 SP per strip from monitor room

## 2018-08-14 NOTE — DISCHARGE PLANNING
Dr. Abbasi is recommending [facility] as long as family is able/willing to assist 24/7 upon as well as to inform Arpita that her Mother will be @ [facility] for approximately 10 days. Msg left for Arpita.     Msg placed to Dr. Borja-seeking medical clearance in the case that Dr. Abbasi is agreeable with an admission.

## 2018-08-15 NOTE — PROGRESS NOTES
Report called to Ela DOWNS at rehab. PIV left in place. All belongings with patients. COBRA with transport; copy of ID placed in chart.

## 2018-08-15 NOTE — H&P
Date of Admission: 8/15/2018  3:55 PM  Johana Weston  RH12/02    [facility] Code / Diagnosis to Support: 01.2 (R) Body Involvement (L) Brain   Reason for admission: Aphasia, Right sided weakness     HPI:  Patient is a 78 y.o. year-old female with a past medical history significant for Anemia, HTN, Hypothyroidism, and CKD? admitted to [facility] on 8/11/2018  7:55 PM with expression aphasia.  Patient was taken to outside hospital where CT head showed a small area of acute lacunar infarct in the right frontal lobe.  Unknown length of time since onset so patient was not a tPA candidate. CTA was reportedly unremarkable.  Patient was started on ASA and plavix.  Per neurology consult NIHSS of 9 with right drift.  MRI brain showed left frontal acute infarct with petechial hemorrhage as well as remote right frontal cortical injury.  Per neurology concern for A fib and may need Loop recorder.  Echo with an EF of 65 on 8/12/18. Repeat CT head on 8/13/18 with subacute infarct in the left frontal lobe without appreciate petechial hemorrhages. Interventional cardiology was did not recommend implantable loop recorder as A fib had not been documented on telemetry.  Since that time A fib has been seen on telemetry and recommending starting AC in 2 weeks.      Per report patient previously living in senior living facility with family members who check in on her.  Patient was last assessed by PT on 8/14/18 and was SBA for transfer and CGA without assistive device (FWW without assist) with recommendation of home with 24/7 supervision. Patient was last evaluated by SLP on 8/14/18 and was recommending FEES for swallow evaluation with dysphagia 1 diet with NTL.   Patient was last evaluated by OT On 8/12/18 and was CGA for ADLs.      No family at bedside today. History taken from:   1- the patient: patient was bad historian due to expression aphasia   2- Yes/no questions style, writing words and numbers on paper have been used  On  today's encounter, she reports swallowing deficulties, SOB and cough, she denies constipation, urine incontinence, burning micturition, chest pain, abdominal pain, fever, headaches. She reports b/l  in her hearing recently which been for the last couple of years. She reports normal bowel habits and sleep.  She reports about 40 years 1/2 to 1 PPD smoking history and she quit  A couple of days back. She reports she quit     REVIEW OF SYSTEMS:     Comprehensive 14 point ROS was reviewed and all were negative except as noted elsewhere in this document.     PMH:  Past Medical History:   Diagnosis Date   • Anemia    • Hypertension    • Leucocytosis    • Renal disorder        PSH:  No past surgical history on file.    FAMILY HISTORY:  History reviewed. No pertinent family history.      MEDICATIONS:  Current Facility-Administered Medications   Medication Dose   • Respiratory Care per Protocol     • Pharmacy Consult Request ...Pain Management Review 1 Each  1 Each   • tramadol (ULTRAM) 50 MG tablet 50 mg  50 mg   • acetaminophen (TYLENOL) tablet 650 mg  650 mg   • senna-docusate (PERICOLACE or SENOKOT S) 8.6-50 MG per tablet 2 Tab  2 Tab    And   • polyethylene glycol/lytes (MIRALAX) PACKET 1 Packet  1 Packet    And   • magnesium hydroxide (MILK OF MAGNESIA) suspension 30 mL  30 mL    And   • bisacodyl (DULCOLAX) suppository 10 mg  10 mg   • artificial tears 1.4 % ophthalmic solution 1 Drop  1 Drop   • benzocaine-menthol (CEPACOL) lozenge 1 Lozenge  1 Lozenge   • hydrALAZINE (APRESOLINE) tablet 25 mg  25 mg   • mag hydrox-al hydrox-simeth (MAALOX PLUS ES or MYLANTA DS) suspension 20 mL  20 mL   • ondansetron (ZOFRAN ODT) dispertab 4 mg  4 mg    Or   • ondansetron (ZOFRAN) syringe/vial injection 4 mg  4 mg   • traZODone (DESYREL) tablet 50 mg  50 mg   • sodium chloride (OCEAN) 0.65 % nasal spray 2 Spray  2 Spray   • atorvastatin (LIPITOR) tablet 40 mg  40 mg   • [START ON 2018] aspirin (ASA) chewable tab 81 mg  81  mg   • heparin injection 5,000 Units  5,000 Units   • [START ON 2018] metoprolol SR (TOPROL XL) tablet 25 mg  25 mg   • [START ON 2018] nicotine (NICODERM) 7 MG/24HR 7 mg  7 mg       ALLERGIES:  Codeine    PSYCHOSOCIAL HISTORY:  Prior Living Situation:   Housing / Facility: Unable To Determine At This Time  Lives with - Patient's Self Care Capacity: Adult Children, Alone and Unable to Care For Self  Equipment Owned: Unable to Determine At This Time     Prior Level of Function / Living Situation:   Physical Therapy: Prior Services: None  Housing / Facility: Unable To Determine At This Time  Equipment Owned: Unable to Determine At This Time  Lives with - Patient's Self Care Capacity: Adult Children, Alone and Unable to Care For Self  Bed Mobility: Unable To Determine At This Time  Transfer Status: Unable To Determine At This Time  Ambulation: Unable To Determine At This Time  Current Level of Function:   Level Of Assist: Contact Guard Assist  Assistive Device: None  Distance (Feet):  (hallway distances >500')  Deviation:  (slight unsteadiness)  Weight Bearing Status: FWB  Skilled Intervention: Postural Facilitation  Comments: Increase in unsteadiness w/fatigue. Pt would benefit from FWW for hallway distances to allow for safety. Pt amb on 2L O2.   Supine to Sit: Supervised  Sit to Supine: Supervised  Scooting: Supervised  Rolling: Supervised  Comments: Did not assess, pt up in the chair upon entry into the room and returned to chair for lunch.   Sit to Stand: Stand by Assist (from various height surfaces)  Bed, Chair, Wheelchair Transfer: Stand by Assist  Transfer Method:  (stand step without device)  Skilled Intervention: Verbal Cuing, Postural Facilitation, Compensatory Strategies  Sitting in Chair: declined chair  Sitting Edge of Bed: 5mins  Standinmins  Occupational Therapy:   Self Feeding: Unable To Determine At This Time  Dressing: Unable To Determine At This Time  Medication Management: Unable To  Determine At This Time  Prior Level Of Mobility: Unable to Determine At This Time  Prior Services: None  Housing / Facility: Unable To Determine At This Time  Current Level of Function:   Upper Body Dressing: Supervision  Lower Body Dressing: Contact Guard Assist  Speech Language Pathology:   Assessment : Patient seen for an Aphasia evaluation on this date.  Patient presented with severe to profound expressive deficits.  She only produced minimal spontaneous grunts and brief vocalizations but no spontaneous or purposeful words.  In a writing task, she antonella a poorly formed [place] and wrote simple words to dictation, however, more complex words (greater than 3 letters) resulted in letter additions and substitutions.  Legibility was moderate-severely impaired.  Patient had impaired reading.  Letter identification was inconsistent as well as reading simple words.  She demonstrated increased frustration with reading and speech tasks.  In the clock drawing task, the patient had number omissions, impaired number sequencing, and incorrect hand placement.  The patient would benefit from continued SLP services to address aphasia and dysphagia during the acute setting as well as intensive therapy during the next level of care.   Problem List: Dysphagia, Aphasia  Diet / Liquid Recommendation: NPO, Pre-Feeding Trials with SLP Only  Comments: Patient seen for reassessment of swallowing function on this date and is currently NPO by RN for concerns of choking on medications.  Patient with improved speech and able to produce some volitional speech.  Presentation of PO included nectars and pudding.  The patient presented with effortful swallow, delayed initiation of swallow trigger up to 4 seconds and noisy swallow.  Reflexive cough x1 with sips of nectars was noted which may be concerning for penetration/aspiration.  Patient attempted but did not produce a volitional cough.  Given expressive aphasia and suspected apraxia, unable to  "fully assess vocal quality.  Discussed case with MD and received orders for a FEES to further assess pharyngeal swallowing function.    Rehabilitation Prognosis/Potential: Good  Estimated Length of Stay: 10 days       CURRENT LEVEL OF FUNCTION:   Same as level of function prior to admission to [facility]    PHYSICAL EXAM:     VITAL SIGNS:   height is 1.626 m (5' 4\") and weight is 61 kg (134 lb 8 oz). Her temperature is 36.3 °C (97.4 °F). Her blood pressure is 130/74 and her pulse is 65. Her respiration is 18 and oxygen saturation is 95%.     GENERAL: No apparent distress, pleasant, looks in good mood, IV line on her right arm.  HEENT: Atraumatic, normocephalic, lost all her teeth, the patient can follow verbal instructions. Take her long time to say some small phrases   CARDIAC: Regular rate and rhythm, normal S1, S2, there is no murmurs  LUNGS: Clear to auscultation   ABDOMINAL: there is no abd distention, pain, scars, + bowels sounds    EXTREMITIES: mild small muscle atrophy on her hands, normal gait, intact peripheral pulse.    NEURO:    Mental status: Unable to determine  Speech: motor aphasia     CRANIAL NERVES:  2,3: visual acuity grossly intact, PERRL  3,4,6: EOMI bilaterally, no nystagmus or diplopia  7: there is mild facial asymmetry, her right nasolabial fold looks mildly diminished  8: hearing grossly intact  9,10: symmetric palate elevation  11: SCM/Trapezius strength 5/5 bilaterally  12: tongue protrudes midline    Motor:  5/5  BUE although weaker on dominant right and 4+ in hand intrinsics  Sensory: intact to light touch through out    DTRs: 2+ in bilateral biceps and patellar tendons    RADIOLOGY:                               CT 8/13/18  Impression       1.  Subacute infarct in the left frontal lobe. When compared with the previous MRI dated 8/12/2018 the hemorrhagic component is not well appreciated.  2.  Moderate chronic microvascular ischemic disease.  3.  Age-appropriate " cerebral atrophy.                 Results for orders placed during the hospital encounter of 08/11/18   MR-BRAIN-W/O    Impression 1.  Small area of acute or subacute ischemia in the high LEFT frontal cortex with petechial hemorrhage  2.  Remote RIGHT frontal cortical ischemic injury  3.  Mild atrophy  4.  Advanced white matter changes                                                     Results for orders placed during the hospital encounter of 08/11/18   MR-MRA NECK-W/O    Impression Grossly unremarkable limited MR angiogram of the carotid arteries and vertebral basilar system.        LABS:  Recent Labs      08/13/18   0435   SODIUM  145   POTASSIUM  3.8   CHLORIDE  115*   CO2  26   GLUCOSE  88   BUN  14   CREATININE  0.92   CALCIUM  8.5     Recent Labs      08/14/18   1554   WBC  9.5   RBC  3.53*   HEMOGLOBIN  10.5*   HEMATOCRIT  33.4*   MCV  94.6   MCH  29.7   MCHC  31.4*   RDW  51.9*   PLATELETCT  229   MPV  10.4             PRIMARY REHAB DIAGNOSIS:    This patient is a 78 y.o. female admitted for acute inpatient rehabilitation with aphasia as complication of  Left MCA CVA    IMPAIRMENTS:   Aphasia  Mobility  ADLs    SECONDARY DIAGNOSIS/MEDICAL CO-MORBIDITIES AFFECTING FUNCTION:  hypertension  hypothyrodism  Anemia    RELEVANT CHANGES SINCE PREADMISSION EVALUATION:    Status unchanged    The patient's rehabilitation potential is Good  The patient's medical prognosis is good    PLAN:   Discussion and Recommendations:   1. The patient requires an acute inpatient rehabilitation program with a coordinated program of care at an intensity and frequency not available at a lower level of care. This recommendation is substantiated by the patient's medical physicians who recommend that the patient's intervention and assessment of medical issues needs to be done at an acute level of care for patient's safety and maximum outcome.   2. A coordinated program of care will be supplied by an interdisciplinary team of physical  therapy, occupational therapy, rehab physician, rehab nursing, and, if needed, speech therapy and rehab psychology. Rehab team presents a patient-specific rehabilitation and education program concentrating on prevention of future problems related to accessibility, mobility, skin, bowel, bladder, sexuality, and psychosocial and medical/surgical problems.   3. Need for Rehabilitation Physician: The rehab physician will be evaluating the patient on a multi-weekly basis to help coordinate the program of care. The rehab physician communicates between medical physicians, therapists, and nurses to maximize the patient's potential outcome. Specific areas in which the rehab physician will be providing daily assessment include the following:   A. Assessing the patient's heart rate and blood pressure response (vitals monitoring) to activity and making adjustments in medications or conservative measures as needed.   B. The rehab physician will be assessing the frequency at which the program can be increased to allow the patient to reach optimal functional outcome.   C. The rehab physician will also provide assessments in daily skin care, especially in light of patient's impairments in mobility.   D. The rehab physician will provide special expertise in understanding how to work with functional impairment and recommend appropriate interventions, compensatory techniques, and education that will facilitate the patient's outcome.   4. Rehab R.N.   The rehab RN will be working with patient to carry over in room mobility and activities of daily living when the patient is not in 3 hours of skilled therapy. Rehab nursing will be working in conjunction with rehab physician to address all the medical issues above and continue to assess laboratory work and discuss abnormalities with the treating physicians, assess vitals, and response to activity, and discuss and report abnormalities with the rehab physician. Rehab RN will also continue  daily skin care, supervise bladder/bowel program, instruct in medication administration, and ensure patient safety.   5. Rehab Therapy: Therapies to treat at intensity and frequency of (may change after completion of evaluation by all therapeutic disciplines):       PT:  Physical therapy to address mobility, transfer, gait training and evaluation for adaptive equipment needs 1 hour/day at least 5 days/week for the duration of the ELOS (see below)       OT:  Occupational therapy to address ADLs, self-care, home management training, functional mobility/transfers and assistive device evaluation, and community re-integration 1 hour/day at least 5 days/week for the duration of the ELOS (see below).        ST/Dysphagia:  Speech therapy to address speech, language, and cognitive deficits as well as swallowing difficulties with retraining/dysphagia management and community re-integration with comprehension, expression, cognitive training 1 hour/day at least 5 days/week for the duration of the ELOS (see below).     Medical management / Rehabilitation Issues/ Adverse Potential as part of rehabilitation plan     REHABILITATION ISSUES/ADVERSE POTENTIAL:  1.  CVA (Cerebrovascular Accident): Cont ASA for secondary prophylaxis as well as lipid and blood pressure management. Patient demonstrates functional deficits in strength, balance, coordination, and ADL's. Patient is admitted to [facility] for comprehensive rehabilitation therapy as described below.   Rehabilitation nursing monitors bowel and bladder control, educates on medication administration, co-morbidities and monitors patient safety.    2.  Neurostimulants: None at this time but continue to assess daily for need to initiate should status change.    3.  DVT prophylaxis:  Patient is on Heparin 5,000 U S/C for anticoagulation upon transfer. Encourage OOB. Monitor daily for signs and symptoms of DVT including but not limited to swelling and pain to  prevent the development of DVT that may interfere with therapies.    4.  GI prophylaxis:  On prilosec to prevent gastritis/dyspepsia which may interfere with therapies.    5.  Pain: No issues with pain currently    6.  Nutrition/Dysphagia: Dietician monitors nutrient intake, recommend supplements prn and provide nutrition education to pt/family to promote optimal nutrition for wound healing/recovery.     7.  Bladder/bowel:  Start bowel and bladder program as described below, to prevent constipation, urinary retention (which may lead to UTI), and urinary incontinence (which will impact upon pt's functional independence).   - TV Q3h while awake with post void bladder scans, I&O cath for PVRs >400  - up to commode after meal     8.  Skin/dermal ulcer prophylaxis: Monitor for new skin conditions with q.2 h. turns as required to prevent the development of skin breakdown.     9.  Cognition/Behavior:  Psychologist Dr. Everett provides adjustment counseling to illness and psychosocial barriers that may be potential barriers to rehabilitation.     10. Respiratory therapy: RT performs O2 management prn, breathing retraining, pulmonary hygiene and bronchospasm management prn to optimize participation in therapies.     MEDICAL CO-MORBIDITIES/ADVERSE POTENTIAL AFFECTING FUNCTION:  CVA - Patient with left frontal CVA.  Patient started on ASA and Statin  -Continue ASA 81 mg  -PT and OT for mobility and ADLs  -SLP for cognition, speech and swallow. Currently on Dysphagia 1 diet    HTN/A fib - Patient on metoprolol. Previously on Losartan 100 mg daily. Per cardiology no need for loop recorder  -Continue Metoprolol 25 mg daily  -Consider AC 2 weeks post-stroke    Smoking - Patient with history of smoking. Will need counselling  -Continue Nicotine patch     Hypothyroidism - Patient previously on 100 mcg. Was not restarted at [facility]. Will restart and check admission    DVT Ppx - Patient transferred on 5000 U q12H    Pt was seen today for  80 min, and entire time spent in face-to-face contact was >50% in counseling and coordination of care as detailed in A/P above.        GOALS/EXPECTED LEVEL OF FUNCTION BASED ON CURRENT MEDICAL AND FUNCTIONAL STATUS (may change based on patient's medical status and rate of impairment recovery):  Mobility: Phong  ADLs: Phong  Cognition: Supervs  Swallow: Dysphagia 2 or 3     DISPOSITION: Discharge to pre-morbid independent living setting with the supportive care of patient's family.    ELOS: 10-14 days    This note adapted from Dr. Feliz, Geriatrics Fellow, from his H&P, changes made to above. Patient seen in conjunction with Fellow staff and assessment and plan were developed from this interviewer.     -----------Non-Face-to-Face------------  Prolonged non-face-to-face time was spent on 8/14/18 from 1225 to 1313 by this reviewer.  This time included medical chart review, medication review, and decision making for the appropriateness of transfer to inpatient rehabilitation.  Please see PM&R Chart Review Consult from 8/14/18 by this provider for detailed summation of the medical chart and the reasoning for current recommendations. In addition to the above, time was spent coordinating care with case management as well as transitional care coordination team in the acceptance and transfer of the patient to the inpatient rehabilitation facility setting.

## 2018-08-15 NOTE — PROGRESS NOTES
"Received bedside report and assumed patient care at 1900. Pt is alert; difficulty obtaining orientation status due to severe expressive aphasia. Pt able to answer \"yes\" and \"no\" questions and speak short statements. Pt denies pain at this time. Telemetry monitor in place. Per telemetry monitoring, pt has been running atrial flutter. Pt remains NPO until FEES study done in AM. Bed locked in lowest position, bed alarm on, call light within reach, hourly rounding in place.        "

## 2018-08-15 NOTE — PROGRESS NOTES
Admit to [facility] from [facility] via [facility].  Dr. Abbasi to follow for diagnosis of Left Acute Ischemic MCA stroke and hypertension.    Initial assessment initiated. Orientation to [facility] process, safety in room, bathroom, and call light.    Client/family goal strengthening and to return home with family.    Dr. Abbasi notified at 1400.    The Rehabilitation Interdisciplinary Plan Of Care(s) intitiated are as follows:  Impaired Physical Mobility, Impaired Swallowing, Impaired Thought Process and Impaired Verbal Communication.    Patient oriented to facility and room.  Education binder given to patient and explained.

## 2018-08-15 NOTE — THERAPY
"Occupational Therapy Treatment completed with focus on ADLs and ADL transfers.  Functional Status:    Sup><sit: SBA  Scooting: Min A  Sit><stand: SBA  LB dressing: CGA  UB dressing: supv  Sinkside grooming: SBA, frequent breaks - had to sit down to complete after approx. 8 mins.    Plan of Care: Will benefit from Occupational Therapy 3 times per week  Discharge Recommendations:  Equipment Will Continue to Assess for Equipment Needs. Post-acute therapy Discharge to a transitional care facility for continued skilled therapy services.    See \"Rehab Therapy-Acute\" Patient Summary Report for complete documentation.     Pt seen today for OT treatment with focus on functional mobility and ADL. Pt appears to have improved compared to performance at time of eval. Pt still presenting with expressive aphasia and has difficulty making needs known, even when given yes/no options. Pt appears to understand, however, and follows directions well. Pt presenting with decreased activity tolerance during functional activity and required frequent breaks. Will continue working with pt in this setting.   "

## 2018-08-15 NOTE — DISCHARGE PLANNING
TCC spoke with Arpita, daughter and she states that Johana will have 24/7 superision between the help of family, friends & neighbors.  Case is under review by Dr. Abbasi.  Msg placed to Dr. Borja-seeking medical clearance.

## 2018-08-15 NOTE — PROGRESS NOTES
Assumed care of pt. A/ox3, expressive aphasia however able to say one word sentences; answered all orientation questions except year. Tele monitor in place. 1 assist to chair. Dysphagia 2 with NTL, 1:1 feed. PIV in right AC 18g, saline locked. POC RIRF at 1300 today. Bed alarm on. Call light in reach, bed in low position, will continue hourly rounding.

## 2018-08-15 NOTE — DISCHARGE INSTRUCTIONS
Discharge Instructions    Discharged to other by [facility] with escort. Discharged via wheelchair, hospital escort: Yes.  Special equipment needed: Not Applicable    Be sure to schedule a follow-up appointment with your primary care doctor or any specialists as instructed.     Discharge Plan:   Influenza Vaccine Indication: Not indicated: Previously immunized this influenza season and > 8 years of age    I understand that a diet low in cholesterol, fat, and sodium is recommended for good health. Unless I have been given specific instructions below for another diet, I accept this instruction as my diet prescription.   Other diet: dysphagia 2 with NTL; 1:1 feed; liquids by spoon only!    Special Instructions:     Stroke/CVA/TIA/Hemorrhagic Ischemia Discharge Instructions  You have had a stroke. Your risk factors have been identified as follows:  Age - Over 55  High blood pressure  High Cholesterol and lipids  It is important that you reduce your risk factors to avoid another stroke in the future. Here are some general guidelines to follow:  · Eat healthy - avoid food high in fat.  · Get regular exercise.  · Maintain a healthy weight.  · Avoid smoking.  · Avoid alcohol and illegal drug use.  · Take your medications as directed.  For more information regarding risk factors, refer to pages 17-19 in your Stroke Patient Education Guide. Stroke Education Guide was given to patient.    Warning signs of a stroke include (which can also be found on page 3 of your Stroke Patient Education Guide):  · Sudden numbness of weakness of the face, arm or leg (especially on one side of the body).  · Sudden confusion, trouble speaking or understanding.  · Sudden trouble seeing in one or both eyes.  · Sudden trouble walking, dizziness, loss of balance or coordination.  · Sudden severe headache with no known cause.  It is very important to get treatment quickly when a stroke occurs. If you experience any of the above warning signs, call  [phone] immediately.     Some patients who have had a stroke will be going home on a blood thinner medication called Warfarin (Coumadin).  This medication requires very close monitoring and follow up.  This follow up can be provided by either your Primary Care Physician or by [facility] Outpatient Anticoagulation Service.  The Outpatient Anticoagulation Service is located at the [facility] at [facility] ([facility]).  If you do not know when your follow up appointment is scheduled, call [phone] to verify your appointment time.      · Is patient discharged on Warfarin / Coumadin?   No     Depression / Suicide Risk    As you are discharged from this [facility], it is important to learn how to keep safe from harming yourself.    Recognize the warning signs:  · Abrupt changes in personality, positive or negative- including increase in energy   · Giving away possessions  · Change in eating patterns- significant weight changes-  positive or negative  · Change in sleeping patterns- unable to sleep or sleeping all the time   · Unwillingness or inability to communicate  · Depression  · Unusual sadness, discouragement and loneliness  · Talk of wanting to die  · Neglect of personal appearance   · Rebelliousness- reckless behavior  · Withdrawal from people/activities they love  · Confusion- inability to concentrate     If you or a loved one observes any of these behaviors or has concerns about self-harm, here's what you can do:  · Talk about it- your feelings and reasons for harming yourself  · Remove any means that you might use to hurt yourself (examples: pills, rope, extension cords, firearm)  · Get professional help from the community (Mental Health, Substance Abuse, psychological counseling)  · Do not be alone:Call your Safe Contact- someone whom you trust who will be there for you.  · Call your local CRISIS HOTLINE 889-8313 or 185-791-1975  · Call your  local Children's Mobile Crisis Response Team [place] (497) 837-6229 or [email or web address]  · Call the toll free National Suicide Prevention Hotlines   · National Suicide Prevention Lifeline 277-023-VAXV (0699)  · National Hope Line Network 800-SUICIDE (669-2362)

## 2018-08-15 NOTE — THERAPY
"Speech Language Therapy FEES completed.  Functional Status: FEES procedure explained, the patient agreed to proceed and tolerated well.  Presentation of PO included ice chips, nectars, purees, pudding, soft solids, and thin liquids.  The patient presented with mild-moderate pharyngeal dysphagia as seen by rapid uncontrolled premature spillage to the valleculae and pyriform sinuses with thins and nectars.  Trace vallecular and pyriform sinus residue noted with thins, nectars, and pudding but cleared with cues to a double swallow.  Deep penetration to the vocal cords was observed before the swallow with nectars and thins.  Aspiration was suspected during the swallow with nectars and thins as seen by blue on the first tracheal ring and coughing blue. Multiple swallowing strategies were trialed.  The patient had improved bolus control with 3/4 teaspoons of nectars when utilizing a chin tuck.  However, she continued to have deep penetration/aspiration with cup sips and a chin tuck. Discussed case at length with MD.  At this time, recommend initiation of dysphagia II/nectars diet.  All nectars to be via teaspoon and the patient MUST utilize a chin tuck 100% of the time with liquids!  1:1 supervision with all meals. Recommend follow up CXR in 48 hours and the patient may benefit from a Modified Barium Swallow study.  SLP following closely.    Recommendations - Diet: Diet / Liquid Recommendation: NPO, Pre-Feeding Trials with SLP Only                          Strategies: Strict 1:1 feeding , No Straws and Head of Bed at 90 Degrees                          Medication Administration: Medication Administration : Crush all Medications in Puree  Plan of Care: Will benefit from Speech Therapy 5 times per week  Post-Acute Therapy: Discharge to a transitional care facility for continued skilled therapy services.    See \"Rehab Therapy-Acute\" Patient Summary Report for complete documentation.   "

## 2018-08-15 NOTE — DISCHARGE PLANNING
Anticipated Discharge Disposition: IRF    Action: LSW received message from Kedar Pacheco from [facility].  Patient to d/c at 1300 to [facility].    Barriers to Discharge: None    Plan: LSW to continue to assist with d/c as needed.

## 2018-08-15 NOTE — CARE PLAN
Problem: Communication  Goal: The ability to communicate needs accurately and effectively will improve    Intervention: Develop alternate methods of communication with patient and significant other/support system  Pt can answer simple questions, yes/no      Problem: Safety  Goal: Will remain free from injury    Intervention: Provide assistance with mobility  Hand held assist with ambulation

## 2018-08-15 NOTE — DISCHARGE PLANNING
Dr. Abbasi has accepted.  Transport has been arranged for 1300.  Dr. Borja and Arpita, daughter are aware.  [facility] left for Jose Armando, [address].

## 2018-08-15 NOTE — PROGRESS NOTES
Monitor Summary: SB 49-58, AZ .12, QRS .08, QT .40 with HR as low as 41 per strip from monitor room

## 2018-08-15 NOTE — FLOWSHEET NOTE
08/15/18 7305   Type of Assessment   Assessment Yes  (mostly from chart, pt unable to answer questions)   Patient History   Pulmonary Diagnosis no   Home O2 No   Home Treatments/Frequency No   COPD Risk Screening   Do you have a history of COPD? No   Smoking History   Have you ever smoked Yes   Have you smoked in the last 12 months Yes   Have you quit smoking No   Smoking Cessation Offered (not yet)   Level Of Consciousness   Level of Consciousness Alert   Respiratory WDL   Respiratory (WDL) X   Chest Exam   Respiration 18   Pulse 65   Breath Sounds   RML Breath Sounds Diminished   RLL Breath Sounds Diminished   LLL Breath Sounds Diminished   Secretions   Cough Congested;Non Productive   How Sputum Obtained Spontaneous   Oximetry   #Pulse Oximetry (Single Determination) Yes   Oxygen   Pulse Oximetry 95 %   O2 Daily Delivery Respiratory  Room Air with O2 Available

## 2018-08-16 NOTE — CARE PLAN
Problem: Safety  Goal: Will remain free from injury  Patient educated on the importance of using call light for assistance, patient verbalized understanding. Patient uses call light appropriately, does not attempt to self transfer. Call light and belongings within reach, bed in lowest and locked position, bed rails up x3 for safety, and non skid socks on. Hourly rounding in place.    Problem: Bowel/Gastric:  Goal: Normal bowel function is maintained or improved  Last BM 8/15/18, denies s/s of constipation. Patient refused senna tonight. Bowel sounds normoactive in all 4 quadrants, abdomen soft and non-tender, no distention noted.

## 2018-08-16 NOTE — CARE PLAN
Problem: Other Problem (see comments)  Goal: Other Goal  Monitor/Evaluation: Monitor PO intake, weight, labs, medication adjustments, skin integrity, GI function, vitals, I/Os, and overall hydration status.  Adjust nutritional POC pending clinical outcomes.   RD following weekly.    Goal: Maintain >/= 50% adequate oral nutrient/fluid intake to promote nutrition optimization/healing.   Outcome: NOT MET

## 2018-08-16 NOTE — CONSULTS
DATE OF SERVICE:  08/16/2018    REQUESTING PHYSICIAN:  Hayden Abbasi MD    CHIEF COMPLAINT / REASON FOR CONSULTATION:   Hypertension  Recent Tachycardia/? Afib  Leukocytosis  Elevated TSH    HISTORY OF PRESENT ILLNESS:  This is a 78 y.o. year-old female with a past medical history significant for HTN, Hypothyroidism, and ? CKD who was admitted to [facility] on 8/11/2018 with expression aphasia.  Patient was taken to outside hospital where CT head showed a small area of acute lacunar infarct in the right frontal lobe.  Unknown length of time since onset so patient was not a tPA candidate. CTA was reportedly unremarkable.  Patient was started on ASA and plavix.  MRI brain showed left frontal acute infarct with petechial hemorrhage as well as remote right frontal cortical injury.  Per neurology concern for A fib and may need Loop recorder.  Echo showed an EF of 65 on 8/12/18. Repeat CT head on 8/13/18 showed subacute infarct in the left frontal lobe without appreciate petechial hemorrhages.  Interventional cardiology did not recommend implantable loop recorder as A-fib had not been documented on telemetry.  Since that time A fib has been seen on telemetry and recommending starting anticoagulation in 2 weeks.       Because of the patient's weakness and debility, Rehab was consulted, evaluated the patient, and he was deemed a good Rehab candidate.  The patient was transferred over to the Rehab facility on 08/15/2018.      The patient denies fever, chills, nausea, vomiting, headaches, blurry vision, or chest pain.    Because of these issues, Internal Medicine was consulted to help evaluate and manage the patient.    REVIEW OF SYSTEMS: All review of systems are negative pre AMA and CMS criteria   except for that stated in the HPI.    PAST MEDICAL HISTORY:  Past Medical History:   Diagnosis Date   • Anemia    • Hypertension    • Leucocytosis    • Renal disorder        PAST SURGICAL HISTORY:  Past Surgical  History:   Procedure Laterality Date   • GYN SURGERY      hysterectomy   • OTHER ORTHOPEDIC SURGERY      carpal tunnel surgery       Allergies   Allergen Reactions   • Codeine Rash     Rash       CURRENT MEDICATIONS:    Current Facility-Administered Medications:   •  losartan  •  Respiratory Care per Protocol  •  Pharmacy Consult Request  •  tramadol  •  acetaminophen  •  senna-docusate **AND** polyethylene glycol/lytes **AND** magnesium hydroxide **AND** bisacodyl  •  artificial tears  •  benzocaine-menthol  •  hydrALAZINE  •  mag hydrox-al hydrox-simeth  •  ondansetron **OR** ondansetron  •  traZODone  •  sodium chloride  •  atorvastatin  •  aspirin  •  heparin  •  nicotine  •  levothyroxine  •  metoprolol SR    Social History     Social History   • Marital status:      Spouse name: N/A   • Number of children: N/A   • Years of education: N/A     Social History Main Topics   • Smoking status: Current Every Day Smoker     Packs/day: 1.00     Types: Cigarettes   • Smokeless tobacco: Never Used   • Alcohol use No   • Drug use: No   • Sexual activity: Not on file     Other Topics Concern   • Not on file     Social History Narrative   • No narrative on file       FAMILY HISTORY:  was reviewed and is not pertinent to this consultation.    PHYSICAL EXAMINATION:  VITAL SIGNS:  Temp is 98.4, blood pressure is 130//74, heart rate is 60-65, respiratory rate is 18.  GENERAL:  Patient was lying in bed in no distress.  HEENT:  Pupils were equal, round and reactive to light and accomodation.  Oral mucosa was pink and moist.  NECK:  Soft.  Supple.  No JVD.  HEART:  Regular rate and rhythm.  Normal S1 and S2.  No murmurs were appreciated.  LUNGS:  There was noted a few LLL crackles.  ABDOMEN:  Soft, non tender, non distended.  Bowels sound were positive in all four quadrants.  EXTREMITIES:  No clubbing, cyanosis.  There was no lower extremity edema.  NEUROLOGIC:  Cranial nerves two through twelve were grossly intact.   There was noted aphasia.    LABS:  Lab Results   Component Value Date/Time    SODIUM 144 08/16/2018 05:28 AM    POTASSIUM 3.7 08/16/2018 05:28 AM    CHLORIDE 111 08/16/2018 05:28 AM    CO2 25 08/16/2018 05:28 AM    GLUCOSE 92 08/16/2018 05:28 AM    BUN 11 08/16/2018 05:28 AM    CREATININE 0.81 08/16/2018 05:28 AM      Lab Results   Component Value Date/Time    WBC 11.5 (H) 08/16/2018 05:28 AM    RBC 3.48 (L) 08/16/2018 05:28 AM    HEMOGLOBIN 10.4 (L) 08/16/2018 05:28 AM    HEMATOCRIT 32.0 (L) 08/16/2018 05:28 AM    MCV 92.0 08/16/2018 05:28 AM    MCH 29.9 08/16/2018 05:28 AM    MCHC 32.5 (L) 08/16/2018 05:28 AM    MPV 10.7 08/16/2018 05:28 AM    NEUTSPOLYS 40.80 (L) 08/16/2018 05:28 AM    LYMPHOCYTES 41.50 (H) 08/16/2018 05:28 AM    MONOCYTES 5.70 08/16/2018 05:28 AM    EOSINOPHILS 11.30 (H) 08/16/2018 05:28 AM    BASOPHILS 0.40 08/16/2018 05:28 AM      No results found for: PROTHROMBTM, INR     ASSESSMENT:  1.  Hypertension  2.  Recent Tachy/?Afib  3.  Leukocytosis  4.  Elevated TSH   5.  For the other assessment, please see the past medical history above.    PLAN:  For the patient's hypertension and recent tachycardia/? afib, she does have a history of hypertension but no documentation of tachycardia or afib.  At [facility], Cardiology evaluated the patient and suggested there was a possibility of afib but did not feel the patient required a loop recorder (apparently there was no good documentation).  The patient was maintained on Toprol XL.  Her heart rate is well controlled but her blood pressure is slightly elevated.  It appears the patient was on Cozaar at home of 100 mg daily.  Will start Cozaar 25 mg daily.  I will continue to monitor the patient's blood pressure and heart rate while in the hospital and adjust the medications accordingly.    For the patient's leukocytosis, her wbc's stephy up from 9.5 on 8/14 to 11.5 on 8/16.  She has been afebrile.  According to the discharge summary, the speech therapist saw her  and noted she may be aspirating thin liquids. They mentioned she can go on dysphagia, thick liquids with 1:1 feeding.  I will get a urine analysis and CXR for further evluation.    For the patient's elevated TSH, she does have a history of hypo-Th and is on Synthroid.  Her FT4 is wnl.  THis is likely a subclinical picture.  Suggest repeat TFTs when stable as an out patient.    This case has been discussed with the attending Physiatrist.    Thank you for the consultation.  Will follow the patient with you.

## 2018-08-16 NOTE — PROGRESS NOTES
Received shift report and assumed care of patient. Patient awake, calm and stable, currently positioned in bed for comfort and safety, personal items within reach at bedside,  call light within reach. POC discussed. Denies pain or discomfort at this time.

## 2018-08-16 NOTE — PROGRESS NOTES
1915 Received shift report and assumed care of patient. Patient awake and resting in bed. All needs met at this time. Call light and belongings within reach.

## 2018-08-16 NOTE — THERAPY
Occupational Therapy Initial Plan of Care Note    1) Assessment:  Patient is 78 y.o. female with a diagnosis of CVA w/ (R) Body Involvement (L) Brain .  Additional factors influencing patient status / progress (ie: cognitive factors, co-morbidities, social support, etc): Expressive aphasia, R sided weakness and c/o blurred vision with deficits in convergence and divergence. Pt presents to [facility] below baseline for ADLs and IADLs.  Long term and short term goals have been discussed with patient and they are in agreement.  2) Plan:  Recommend Occupational Therapy  minutes per day 5-7 days per week for 2-3 weeks for the following treatments:  OT E Stim Attended, OT Group Therapy, OT Self Care/ADL, OT Cognitive Skill Dev, OT Community Reintegration, OT Manual Ther Technique, OT Neuro Re-Ed/Balance, OT Sensory Int Techniques, OT Therapeutic Activity, OT Evaluation and OT Therapeutic Exercise.  3) Goals:  Please refer to care plan for goals.

## 2018-08-16 NOTE — THERAPY
Physical Therapy Initial Plan of Care Note    1) Assessment: Patient is 78 y.o. female with a diagnosis of L MCA CVA.  Additional factors influencing patient status / progress (ie: cognitive factors, co-morbidities, social support, etc): PMH includes HTN, hypothyroidism, supportive adult children.  Long term and short term goals have been discussed with patient and they are in agreement.  2) Plan: Recommend Physical Therapy 30-60 minutes per day 5-7 days per week for 2-3 weeks for the following treatments:  PT Group Therapy, PT Gait Training, PT Therapeutic Exercises, PT Neuro Re-Ed/Balance, PT Therapeutic Activity and PT Evaluation.  3) Goals:  Please refer to care plan for goals.

## 2018-08-16 NOTE — PROGRESS NOTES
"Rehab Progress Note     Encounter Date: 8/16/2018    CC: Aphasia  Dysarthria     Interval Events (Subjective)  Patient is a 78 y.o. year-old female with a past medical history significant for Anemia, HTN, Hypothyroidism, CKD?  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 She also has old lacunar infarct in the right frontal lobe.  Today, she showed significant speech improvement. She now able to give more information about herself like her previous job. She still facing hard time to make long sentences and fluency. She denies SOB, cough, chest pain, headache, abdominal pain, limb weakness, constipation or burning mictuation       Objective:  VITAL SIGNS: /74   Pulse 60   Temp 36.9 °C (98.4 °F)   Resp 18   Ht 1.626 m (5' 4\")   Wt 61 kg (134 lb 8 oz)   SpO2 90%   Breastfeeding? No   BMI 23.09 kg/m²   Gen: NAD  Psych: Mood and affect appropriate  CV: RRR, no edema  Resp: CTAB, no upper airway sounds  Abd: NTND  Neuro: Dysarthric speech, must be given significant time to respond, able to propel wheelchair with BUE    Recent Results (from the past 72 hour(s))   CBC WITHOUT DIFFERENTIAL    Collection Time: 08/14/18  3:54 PM   Result Value Ref Range    WBC 9.5 4.8 - 10.8 K/uL    RBC 3.53 (L) 4.20 - 5.40 M/uL    Hemoglobin 10.5 (L) 12.0 - 16.0 g/dL    Hematocrit 33.4 (L) 37.0 - 47.0 %    MCV 94.6 81.4 - 97.8 fL    MCH 29.7 27.0 - 33.0 pg    MCHC 31.4 (L) 33.6 - 35.0 g/dL    RDW 51.9 (H) 35.9 - 50.0 fL    Platelet Count 229 164 - 446 K/uL    MPV 10.4 9.0 - 12.9 fL   CBC with Differential    Collection Time: 08/16/18  5:28 AM   Result Value Ref Range    WBC 11.5 (H) 4.8 - 10.8 K/uL    RBC 3.48 (L) 4.20 - 5.40 M/uL    Hemoglobin 10.4 (L) 12.0 - 16.0 g/dL    Hematocrit 32.0 (L) 37.0 - 47.0 %    MCV 92.0 81.4 - 97.8 fL    MCH 29.9 27.0 - 33.0 pg    MCHC 32.5 (L) 33.6 - 35.0 g/dL    RDW 49.7 35.9 - 50.0 " fL    Platelet Count 238 164 - 446 K/uL    MPV 10.7 9.0 - 12.9 fL    Neutrophils-Polys 40.80 (L) 44.00 - 72.00 %    Lymphocytes 41.50 (H) 22.00 - 41.00 %    Monocytes 5.70 0.00 - 13.40 %    Eosinophils 11.30 (H) 0.00 - 6.90 %    Basophils 0.40 0.00 - 1.80 %    Immature Granulocytes 0.30 0.00 - 0.90 %    Nucleated RBC 0.00 /100 WBC    Neutrophils (Absolute) 4.69 2.00 - 7.15 K/uL    Lymphs (Absolute) 4.77 1.00 - 4.80 K/uL    Monos (Absolute) 0.65 0.00 - 0.85 K/uL    Eos (Absolute) 1.30 (H) 0.00 - 0.51 K/uL    Baso (Absolute) 0.05 0.00 - 0.12 K/uL    Immature Granulocytes (abs) 0.03 0.00 - 0.11 K/uL    NRBC (Absolute) 0.00 K/uL   Comp Metabolic Panel (CMP)    Collection Time: 08/16/18  5:28 AM   Result Value Ref Range    Sodium 144 135 - 145 mmol/L    Potassium 3.7 3.6 - 5.5 mmol/L    Chloride 111 96 - 112 mmol/L    Co2 25 20 - 33 mmol/L    Anion Gap 8.0 0.0 - 11.9    Glucose 92 65 - 99 mg/dL    Bun 11 8 - 22 mg/dL    Creatinine 0.81 0.50 - 1.40 mg/dL    Calcium 9.0 8.5 - 10.5 mg/dL    AST(SGOT) 17 12 - 45 U/L    ALT(SGPT) 11 2 - 50 U/L    Alkaline Phosphatase 78 30 - 99 U/L    Total Bilirubin 0.3 0.1 - 1.5 mg/dL    Albumin 3.2 3.2 - 4.9 g/dL    Total Protein 5.9 (L) 6.0 - 8.2 g/dL    Globulin 2.7 1.9 - 3.5 g/dL    A-G Ratio 1.2 g/dL   Lipid Profile (Lipid Panel)    Collection Time: 08/16/18  5:28 AM   Result Value Ref Range    Cholesterol,Tot 93 (L) 100 - 199 mg/dL    Triglycerides 80 0 - 149 mg/dL    HDL 41 >=40 mg/dL    LDL 36 <100 mg/dL   TSH with Reflex to FT4    Collection Time: 08/16/18  5:28 AM   Result Value Ref Range    TSH 5.880 (H) 0.380 - 5.330 uIU/mL   Vitamin D, 25-hydroxy (blood)    Collection Time: 08/16/18  5:28 AM   Result Value Ref Range    25-Hydroxy   Vitamin D 25 40 30 - 100 ng/mL   ESTIMATED GFR    Collection Time: 08/16/18  5:28 AM   Result Value Ref Range    GFR If African American >60 >60 mL/min/1.73 m 2    GFR If Non African American >60 >60 mL/min/1.73 m 2   FREE THYROXINE    Collection  Time: 08/16/18  5:28 AM   Result Value Ref Range    Free T-4 1.20 0.53 - 1.43 ng/dL       Current Facility-Administered Medications   Medication Frequency   • losartan (COZAAR) tablet 25 mg Q DAY   • Respiratory Care per Protocol Continuous RT   • Pharmacy Consult Request ...Pain Management Review 1 Each PRN   • tramadol (ULTRAM) 50 MG tablet 50 mg Q4HRS PRN   • acetaminophen (TYLENOL) tablet 650 mg Q4HRS PRN   • senna-docusate (PERICOLACE or SENOKOT S) 8.6-50 MG per tablet 2 Tab BID    And   • polyethylene glycol/lytes (MIRALAX) PACKET 1 Packet QDAY PRN    And   • magnesium hydroxide (MILK OF MAGNESIA) suspension 30 mL QDAY PRN    And   • bisacodyl (DULCOLAX) suppository 10 mg QDAY PRN   • artificial tears 1.4 % ophthalmic solution 1 Drop PRN   • benzocaine-menthol (CEPACOL) lozenge 1 Lozenge Q2HRS PRN   • hydrALAZINE (APRESOLINE) tablet 25 mg Q8HRS PRN   • mag hydrox-al hydrox-simeth (MAALOX PLUS ES or MYLANTA DS) suspension 20 mL Q2HRS PRN   • ondansetron (ZOFRAN ODT) dispertab 4 mg 4X/DAY PRN    Or   • ondansetron (ZOFRAN) syringe/vial injection 4 mg 4X/DAY PRN   • traZODone (DESYREL) tablet 50 mg QHS PRN   • sodium chloride (OCEAN) 0.65 % nasal spray 2 Spray PRN   • atorvastatin (LIPITOR) tablet 40 mg Q EVENING   • aspirin (ASA) chewable tab 81 mg DAILY   • heparin injection 5,000 Units Q12HRS   • nicotine (NICODERM) 7 MG/24HR 7 mg Daily-0600   • levothyroxine (SYNTHROID) tablet 100 mcg AM ES   • metoprolol SR (TOPROL XL) tablet 25 mg DAILY       Orders Placed This Encounter   Procedures   • Diet Order Regular     Standing Status:   Standing     Number of Occurrences:   1     Order Specific Question:   Diet:     Answer:   Regular [1]     Order Specific Question:   Texture/Fiber modifications:     Answer:   Dysphagia 2(Pureed/Chopped)specify fluid consistency(question 6) [2]     Order Specific Question:   Consistency/Fluid modifications:     Answer:   Nectar Thick [2]     Order Specific Question:   Miscellaneous  modifications:     Answer:   SLP - 1:1 Supervision by Nursing [21]     Order Specific Question:   Miscellaneous modifications:     Answer:   SLP - Deliver to Nursing Station [22]       Assessment:  Active Hospital Problems    Diagnosis   • *Stroke (cerebrum) (HCC)   • Paroxysmal A-fib (HCC)   • Hypertension   • Leukocytosis   • Normocytic anemia   • Hypothyroidism   • Acute kidney injury (HCC)       Medical Decision Making and Plan:  CVA - Patient with left frontal CVA.  Patient started on ASA and Statin  -Continue ASA 81 mg  -PT and OT for mobility and ADLs  -SLP for cognition, speech and swallow. Currently on Dysphagia 1 diet. Per discussion with SLP MBSS tomorrow     HTN/A fib - Patient on metoprolol. Previously on Losartan 100 mg daily. Per cardiology no need for loop recorder  -Continue Metoprolol 25 mg daily. Per Hospitalist restart Losartan 25 mg daily  -Consider AC 2 weeks post-stroke     Smoking - Patient with history of smoking. Will need counselling  -Continue Nicotine patch      Hypothyroidism - Patient previously on 100 mcg. Was not restarted at [facility]. Will restart and check admission     DVT Ppx - Patient transferred on 5000 U q12H     Total time:  30 minutes.  I spent greater than 50% of the time for patient care and coordination on this date, including unit/floor time, and face-to-face time with the patient as per assessment and plan above.  Discussed admission labs and UA/CXR. Discussed case with Hospitalist.     Melquiades Abbasi M.D.

## 2018-08-16 NOTE — THERAPY
Speech Therapy Initial Plan of Care Note    1) Assessment:  Patient is 78 y.o. female with a diagnosis of Left CVA with right side weakness.  Additional factors influencing patient status / progress (ie: cognitive factors, co-morbidities, social support, etc): Patient has supportive family living near by.  Displays dysphagia and anomic aphasia with mild dysarthria.  Long term and short term goals have been discussed with patient and they are in agreement.  2) Plan:  Recommend Speech Therapy 30-60 minutes per day 5-6 days per week for 3 weeks for the following treatments:  SLP Speech Language Treatment, SLP Swallowing Dysfunction Treatment, SLP Video Swallow Evaluation, SLP Self Care / ADL Training , SLP Cognitive Skill Development and SLP Group Treatment.  3) Goals:  Please refer to care plan for goals.

## 2018-08-16 NOTE — REHAB-DIETARY IDT TEAM NOTE
Dietary   Nutrition       Dietary Problems           Problem: Other Problem (see comments)     Description: Diagnosis: Swallowing difficulty r/t left frontal lobe subacute infarct as evidenced by FEES examination and ST recommendation for dysphagia diet with thickened liquids.     Goal: Other Goal     Description: Monitor/Evaluation: Monitor PO intake, weight, labs, medication adjustments, skin integrity, GI function, vitals, I/Os, and overall hydration status.  Adjust nutritional POC pending clinical outcomes.   RD following weekly.    Goal: Maintain >/= 50% adequate oral nutrient/fluid intake to promote nutrition optimization/healing.              Nutrition services: Day 0 of admit.  Johana Weston is a 78 y.o. 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     Consult received for supplements.  Pt seen today in her room today to introduce this writer and discuss current appetite. Pt able to make her needs known although struggles to form responses. With time and encouragement pt was able to provide the following information. She notes that her appetite is not at her baseline and quantifies her appetite as 'less than 5 out of 10'. She indicates it has been this was for awhile. She confirms wt loss based on the fact that her clothing has been fitting more loosely. She is interested in adding Boost Plus Vanilla 2x daily to encourage better PO intake. She normally drinks a few Boost per week at home. She notes a stable UBW of 135# which is consistent with [facility] admit wt on 8/11 of 135# (61.3kg) by bed scale. Current wt is stable at 134.5# (61kg). Pt notes that she was NPO 'a lot' during prior admission. Per review of chart pt was NPO off and on throughout her acute stay. PO intake was 50-75% of all meal records, however. Pt noted to have FEES completed today prior to transfer and was found appropriate for a D2 / NTL diet d/t concern of aspiration of NTL/thins. Diet and menu  "explained to the pt and she verbalized understanding. The pt was encouraged to continue good PO intake as she is able and to communicate with nutrition services if she would like to amend her preferences moving forward. The pt verbalized that she would.     Assessment:  Height: 162.6 cm (5' 4\")  Weight: 61 kg (134 lb 8 oz)  Body mass index is 23.09 kg/m² - WNL  Diet/Intake: Regular / D2 / NTL, 1:1 supervision by nursing. No PO records on file yet.     Evaluation:   1. Labs: None new today  2. Meds: Lipitor, Heparin, Pericolace, (PRN: Hydralazine, Zofran, Miralax, MOM, Dulcolax, Tramadol)  3. Fluids: None currently in MAR  4. Skin: Intact - no skin breakdown noted  5. GI/: last BM 8/15 (small, soft), UOP yellow per chart  6. I/O's: None recorded yet today     Diagnosis: Swallowing difficulty r/t left frontal lobe subacute infarct as evidenced by FEES examination and ST recommendation for dysphagia diet with thickened liquids.     Intervention/ Recommendations/POC:  1. Continue current diet. Boost Plus Vanilla with breakfast and for HS snack daily to be more like home meal routine.  2.Encourage adequate PO/fluid intake.  3. Nutrition rep to see regarding food prefs/ honor within dietary restrictions (if indicated)     Monitor/Evaluation: Monitor PO intake, weight, labs, medication adjustments, skin integrity, GI function, vitals, I/Os, and overall hydration status.  Adjust nutritional POC pending clinical outcomes.    RD following weekly.     Goal: Maintain >/= 50% adequate oral nutrient/fluid intake to promote nutrition optimization/healing.     Section completed by:  Bonita Huitron R.D.     "

## 2018-08-16 NOTE — DIETARY
"Nutrition services: Day 0 of admit.  Johana Weston is a 78 y.o. 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    Consult received for supplements.  Pt seen today in her room today to introduce this writer and discuss current appetite. Pt able to make her needs known although struggles to form responses. With time and encouragement pt was able to provide the following information. She notes that her appetite is not at her baseline and quantifies her appetite as 'less than 5 out of 10'. She indicates it has been this was for awhile. She confirms wt loss based on the fact that her clothing has been fitting more loosely. She is interested in adding Boost Plus Vanilla 2x daily to encourage better PO intake. She normally drinks a few Boost per week at home. She notes a stable UBW of 135# which is consistent with [facility] admit wt on 8/11 of 135# (61.3kg) by bed scale. Current wt is stable at 134.5# (61kg). Pt notes that she was NPO 'a lot' during prior admission. Per review of chart pt was NPO off and on throughout her acute stay. PO intake was 50-75% of all meal records, however. Pt noted to have FEES completed today prior to transfer and was found appropriate for a D2 / NTL diet d/t concern of aspiration of NTL/thins. Diet and menu explained to the pt and she verbalized understanding. The pt was encouraged to continue good PO intake as she is able and to communicate with nutrition services if she would like to amend her preferences moving forward. The pt verbalized that she would.    Assessment:  Height: 162.6 cm (5' 4\")  Weight: 61 kg (134 lb 8 oz)  Body mass index is 23.09 kg/m² - WNL  Diet/Intake: Regular / D2 / NTL, 1:1 supervision by nursing. No PO records on file yet.    Evaluation:   Labs: None new today  Meds: Lipitor, Heparin, Pericolace, (PRN: Hydralazine, Zofran, Miralax, MOM, Dulcolax, Tramadol)  Fluids: None currently in MAR  Skin: Intact - no skin breakdown " noted  GI/: last BM 8/15 (small, soft), UOP yellow per chart  I/O's: None recorded yet today    Diagnosis: Swallowing difficulty r/t left frontal lobe subacute infarct as evidenced by FEES examination and ST recommendation for dysphagia diet with thickened liquids.    Intervention/ Recommendations/POC:  1. Continue current diet. Boost Plus Vanilla with breakfast and for HS snack daily to be more like home meal routine.  2.Encourage adequate PO/fluid intake.  3. Nutrition rep to see regarding food prefs/ honor within dietary restrictions (if indicated)     Monitor/Evaluation: Monitor PO intake, weight, labs, medication adjustments, skin integrity, GI function, vitals, I/Os, and overall hydration status.  Adjust nutritional POC pending clinical outcomes.    RD following weekly.    Goal: Maintain >/= 50% adequate oral nutrient/fluid intake to promote nutrition optimization/healing.

## 2018-08-17 NOTE — IDT DISCHARGE PLANNING
CASE MANAGEMENT INITIAL ASSESSMENT    Admit Date:  8/15/2018     CM/SW met with patient and her son Gato Mcdowell to introduce self and to discuss role of case management / discharge planning / team conference. Patient with verbal deficits, son Gato answered most of assessment questions.  Patient is a  78 year old  female transferred to acute rehab from [facility] (8/11-15/2018) where she was treated for Stroke with aphagia and right-sided weakness.    Diagnosis: 01.2 (R) Body Involvement (L) Brain  Acute ischemic left MCA stroke ([facility])    Co-morbidities:   Patient Active Problem List    Diagnosis Date Noted   • Paroxysmal A-fib ([facility]) 08/13/2018     Priority: High   • Stroke (cerebrum) ([facility]) 08/12/2018     Priority: High   • Leukocytosis 08/12/2018   • Normocytic anemia 08/12/2018   • Hypertension 08/12/2018   • Hypothyroidism 08/12/2018   • Acute kidney injury ([facility]) 08/12/2018     Prior Living Situation:  H[facility] / Facility[facility] [address] [facility] independent living with emergency call system, but no meals or attendant care, no stairs  Lives with - Patient's Self Care Capacity: Alone and Able to Care For Self, with local sons and daughter checking on patient regularly.    Prior Level of Function:  Medication Management: Independent  Finances: Independent  Home Management: Independent  Shopping: Independent  Prior Level Of Mobility: Independent With Device in Community, Independent Without Device in Home  Driving / Transportation: Driving Independent  Pt. Description Of Current ADL Function: Communication Deficits    Support Systems:  Primary : DaughterArpita - phone [phone]  Other support systems: SonHenry - phone [phone]                                          SonGato - phone [phone]  Advance Directives: No    Previous Services Utilized:   Equipment Owned: 4-Wheel Walker  Prior Services:  Home-Independent, some Housekeeping / Homemaker Services, may have used [employer].    Other Information:  Occupation (Pre-Hospital Vocational): Retired Due To Age  Pharmacy: [facility] in [place]  Primary Payor Source: Medicare  Secondary Payor Source: Supplemental Insurance - [facility]  Primary Care Practitioner : Carlos Enrique Amin MD    Additional Case Management Questions:  Have you ever received case management services for yourself or a family member? No    Do you feel you have an an understanding of of what services  provide? Son understood after CM letter and verbal explanation.    Do you have any additional questions regarding case management?  No     Patient / Family Goal:  Patient / Family Goal: To return to [facility] with support from adult children    Plan:  1. Continue to follow patient through hospitalization and provide discharge planning in collaboration with patient, family, physicians and ancillary services.     2. Utilize community resources to ensure a safe discharge.

## 2018-08-17 NOTE — PROGRESS NOTES
Hospital Medicine Progress Note, Adult, Complex               Author: Yohannes Wolff Date & Time created: 8/17/2018  10:16 AM     Interval History:  No significant events or changes since last visit  Patient denies SOB, cough, or diarrhea  Patient slept ok last night    Chief Complaint:  Hypertension  Recent Tachycardia/afib  Leukocytosis    Review of Systems:  Review of Systems   Unable to perform ROS: Medical condition       Physical Exam:  Physical Exam   Constitutional: She is oriented to person, place, and time. She appears well-nourished.   HENT:   Head: Atraumatic.   Eyes: Pupils are equal, round, and reactive to light. Conjunctivae are normal.   Neck: Normal range of motion. Neck supple.   Cardiovascular: Normal rate, regular rhythm, S1 normal and S2 normal.    Pulmonary/Chest: Effort normal. She has rales.   Has a few basilar crackles.   Abdominal: Soft. Bowel sounds are normal. Hernia confirmed negative in the right inguinal area and confirmed negative in the left inguinal area.   Neurological: She is alert and oriented to person, place, and time. No sensory deficit.   Has aphasia.   Skin: Skin is warm and dry. No cyanosis.   Psychiatric: She has a normal mood and affect. Her behavior is normal.   Nursing note and vitals reviewed.      Labs:        Invalid input(s): [id number]      Recent Labs      08/16/18   0528   SODIUM  144   POTASSIUM  3.7   CHLORIDE  111   CO2  25   BUN  11   CREATININE  0.81   CALCIUM  9.0     Recent Labs      08/16/18   0528   ALTSGPT  11   ASTSGOT  17   ALKPHOSPHAT  78   TBILIRUBIN  0.3   GLUCOSE  92     Recent Labs      08/14/18   1554  08/16/18   0528   RBC  3.53*  3.48*   HEMOGLOBIN  10.5*  10.4*   HEMATOCRIT  33.4*  32.0*   PLATELETCT  229  238     Recent Labs      08/14/18   1554  08/16/18   0528   WBC  9.5  11.5*   NEUTSPOLYS   --   40.80*   LYMPHOCYTES   --   41.50*   MONOCYTES   --   5.70   EOSINOPHILS   --   11.30*   BASOPHILS   --   0.40   ASTSGOT   --   17   ALTSGPT    --   11   ALKPHOSPHAT   --   78   TBILIRUBIN   --   0.3           Hemodynamics:  Temp (24hrs), Av.7 °C (98 °F), Min:36.6 °C (97.9 °F), Max:36.7 °C (98.1 °F)  Temperature: 36.6 °C (97.9 °F)  Pulse  Av.1  Min: 56  Max: 65   Blood Pressure : 137/74     Respiratory:    Respiration: 17, Pulse Oximetry: 93 %        RML Breath Sounds: Clear, RLL Breath Sounds: Clear, LLL Breath Sounds: Clear  Fluids:    Intake/Output Summary (Last 24 hours) at 18 1016  Last data filed at 18 0901   Gross per 24 hour   Intake              660 ml   Output                0 ml   Net              660 ml        GI/Nutrition:  Orders Placed This Encounter   Procedures   • Diet Order Regular     Standing Status:   Standing     Number of Occurrences:   1     Order Specific Question:   Diet:     Answer:   Regular [1]     Order Specific Question:   Texture/Fiber modifications:     Answer:   Dysphagia 2(Pureed/Chopped)specify fluid consistency(question 6) [2]     Order Specific Question:   Consistency/Fluid modifications:     Answer:   Nectar Thick [2]     Order Specific Question:   Miscellaneous modifications:     Answer:   SLP - 1:1 Supervision by Nursing [21]     Order Specific Question:   Miscellaneous modifications:     Answer:   SLP - Deliver to Nursing Station [22]     Medical Decision Making, by Problem:  Active Hospital Problems    Diagnosis   • *Stroke (cerebrum) (HCC) [I63.9]   • Paroxysmal A-fib (HCC) [I48.0]   • Hypertension [I10]   • Leukocytosis [D72.829]   • Normocytic anemia [D64.9]   • Hypothyroidism [E03.9]   • Acute kidney injury (HCC) [N17.9]     *  S/P CVA  Involving the left frontal area with petechial hemorrhage  With aphasia    *  Hypertension  BP: ok  On Cozaar: 25 mg daily ()  On Toprol XL: 25 mg daily  Cont to monitor    *  ? Afib  No good documentation seen at [facility]  HR: ok but dipped lower this am to 56  On Toprol XL: 25 mg daily  Cont to monitor for now    *  Leukocytosis  WBC's: 9.5 () -->  11.5 (8/16)  Denies cough  Has been afebrile  CXR (8/16):   U/A: pending  Martínez start Levaquin and Flagyl (8/17)  Note: the speech therapist at [facility] saw her and noted she may be aspirating thin liquids    *  Hypo-Th  TSH: 5.88  FT4: 1.20  Suggest repeat TFTs when stable as an out patient    *  ? CKD  *  Hysterectomy  *  Carpal Tunnel Surgery      Quality-Core Measures   Reviewed items::  Labs reviewed and Medications reviewed

## 2018-08-17 NOTE — PROGRESS NOTES
Received shift report and assumed care of patient.  Patient awake, calm and stable, sitting up into wheelchair. call light within reach.  Denies pain or discomfort at this time.  Will continue to monitor.

## 2018-08-17 NOTE — CARE PLAN
Problem: Communication  Goal: The ability to communicate needs accurately and effectively will improve  Pt has expressive aphasia, pt able to make needs known, uses communication board.     Problem: Venous Thromboembolism (VTW)/Deep Vein Thrombosis (DVT) Prevention:  Goal: Patient will participate in Venous Thrombosis (VTE)/Deep Vein Thrombosis (DVT)Prevention Measures  {t is on unfractionated heparin for DVTs. No s/s of DVTs/

## 2018-08-17 NOTE — CARE PLAN
Problem: Communication  Goal: The ability to communicate needs accurately and effectively will improve  Patient is alert and oriented x 3.  Disoriented to time.  She has expressive aphasia and has a difficult time expressing her needs.  She is able to make needs known given extra time to respond.      Problem: Other Problem (see comments)  Goal: Other Goal  Monitor/Evaluation: Monitor PO intake, weight, labs, medication adjustments, skin integrity, GI function, vitals, I/Os, and overall hydration status.  Adjust nutritional POC pending clinical outcomes.   RD following weekly.    Goal: Maintain >/= 50% adequate oral nutrient/fluid intake to promote nutrition optimization/healing.    Labs monitored and reported to MD.  Orders received for chest xray and UA.  Results pending.

## 2018-08-17 NOTE — REHAB-PHARMACY IDT TEAM NOTE
Pharmacy   Pharmacy  Antibiotics/Antifungals/Antivirals:  Medication:      Active Orders     Ordered     Ordering Provider       Fri Aug 17, 2018 10:28 AM    08/17/18 1028  levoFLOXacin (LEVAQUIN) tablet 500 mg  DAILY      Yohannes Wolff M.D.    08/17/18 1028  metroNIDAZOLE (FLAGYL) tablet 500 mg  EVERY 8 HOURS      Yohannes Wolff M.D.        Route:         PO, PO  Stop Date:  Both 8/22/18  Reason: Leukocytosis, WBC's: 9.5 (8/14) --> 11.5 (8/16), Denies cough, Has been afebrile, CXR (8/16): U/A: pending, Martínez start Levaquin and Flagyl (8/17), Note: the speech therapist at [facility] saw her and noted she may be aspirating thin liquids     Antihypertensives/Cardiac:  Medication:    Orders (72h ago through future)    Start     Ordered    08/16/18 1545  losartan (COZAAR) tablet 25 mg  Q DAY      08/16/18 1525    08/16/18 0900  metoprolol SR (TOPROL XL) tablet 25 mg  DAILY      08/15/18 1749    08/16/18 0600  metoprolol SR (TOPROL XL) tablet 25 mg  Q DAY,   Status:  Discontinued      08/15/18 1349    08/15/18 2100  atorvastatin (LIPITOR) tablet 40 mg  EVERY EVENING      08/15/18 1349    08/15/18 1349  hydrALAZINE (APRESOLINE) tablet 25 mg  EVERY 8 HOURS PRN      08/15/18 1349        Patient Vitals for the past 24 hrs:   BP Pulse   08/17/18 0620 137/74 (!) 56   08/17/18 0511 104/65 -   08/16/18 1854 128/67 60   08/16/18 1736 132/80 -   08/16/18 1500 115/74 60       Anticoagulation:  Medication: Aspirin, Heparin    Other key medications: A review of the medication list reveals no issues at this time. Patient is currently on antihypertensive(s). Recommend home blood pressure monitoring/recording if antihypertensive(s) regimen(s) continue.    Section completed by: Daniel Kumar [facility]

## 2018-08-17 NOTE — REHAB-CM IDT TEAM NOTE
Case Management    DC Planning  DC destination/dispostion:  To return to [place] independent senior living apartment with no stairs where she lives alone; has support from 3 adult children also living in [place].    Referrals: Possible home health services    DC Needs: DME-TBD, patient has 4WW, SPC; appointment with PCP in [place], Carlos Enrique Amin MD    Barriers to discharge:  Lives alone, cognitive deficits, expressive aphasia    Strengths: Supportive family, independent PLOF    Section completed by:  Sumaya Saldana CM MSW

## 2018-08-17 NOTE — CARE PLAN
Problem: Communication  Goal: The ability to communicate needs accurately and effectively will improve  Outcome: PROGRESSING AS EXPECTED  Patient is aphasia but able to make needs known. Communication by bedside.     Problem: Infection  Goal: Will remain free from infection  Outcome: PROGRESSING SLOWER THAN EXPECTED  Patient has elevated WBC. Flagyl and Levaquin ordered by Dr Wolff. Patient received first dose of antibiotic. No adverse reaction noted.

## 2018-08-17 NOTE — PROGRESS NOTES
I spoke with mayo stratton, re pt condition and treatment with antibiotic for elevated WBC and possible pneumonitis with verbalized understanding. She asked that I speak with pt to clarify this. I did speak with pt re need for antibiotic and provided stroke education with verbal understanding but will reinforce,

## 2018-08-17 NOTE — PROGRESS NOTES
"Rehab Progress Note     Encounter Date: 8/17/2018    CC: Aphasia  Dysarthria     Interval Events (Subjective)   Today, she showed steady speech improvement. She still facing hard time to make long sentences and fluency and remembering some words. She reports normal swallowing. She denies SOB, cough, chest pain, headache, abdominal pain, limb weakness, constipation or burning micturation.        Objective:  VITAL SIGNS: /74   Pulse (!) 56   Temp 36.6 °C (97.9 °F)   Resp 17   Ht 1.626 m (5' 4\")   Wt 61 kg (134 lb 8 oz)   SpO2 93%   Breastfeeding? No   BMI 23.09 kg/m²   Gen: NAD  Psych: Mood and affect appropriate  CV: RRR, no edema, intact peripheral pulse  Resp:b/l fine crepitations   Abd: NTND  Neuro: Dysarthric speech, must be given significant time to respond,mild atrophy of her hands muscles  Unchanged from 8/16/18    Recent Results (from the past 72 hour(s))   CBC WITHOUT DIFFERENTIAL    Collection Time: 08/14/18  3:54 PM   Result Value Ref Range    WBC 9.5 4.8 - 10.8 K/uL    RBC 3.53 (L) 4.20 - 5.40 M/uL    Hemoglobin 10.5 (L) 12.0 - 16.0 g/dL    Hematocrit 33.4 (L) 37.0 - 47.0 %    MCV 94.6 81.4 - 97.8 fL    MCH 29.7 27.0 - 33.0 pg    MCHC 31.4 (L) 33.6 - 35.0 g/dL    RDW 51.9 (H) 35.9 - 50.0 fL    Platelet Count 229 164 - 446 K/uL    MPV 10.4 9.0 - 12.9 fL   CBC with Differential    Collection Time: 08/16/18  5:28 AM   Result Value Ref Range    WBC 11.5 (H) 4.8 - 10.8 K/uL    RBC 3.48 (L) 4.20 - 5.40 M/uL    Hemoglobin 10.4 (L) 12.0 - 16.0 g/dL    Hematocrit 32.0 (L) 37.0 - 47.0 %    MCV 92.0 81.4 - 97.8 fL    MCH 29.9 27.0 - 33.0 pg    MCHC 32.5 (L) 33.6 - 35.0 g/dL    RDW 49.7 35.9 - 50.0 fL    Platelet Count 238 164 - 446 K/uL    MPV 10.7 9.0 - 12.9 fL    Neutrophils-Polys 40.80 (L) 44.00 - 72.00 %    Lymphocytes 41.50 (H) 22.00 - 41.00 %    Monocytes 5.70 0.00 - 13.40 %    Eosinophils 11.30 (H) 0.00 - 6.90 %    Basophils 0.40 0.00 - 1.80 %    Immature Granulocytes 0.30 0.00 - 0.90 %    " Nucleated RBC 0.00 /100 WBC    Neutrophils (Absolute) 4.69 2.00 - 7.15 K/uL    Lymphs (Absolute) 4.77 1.00 - 4.80 K/uL    Monos (Absolute) 0.65 0.00 - 0.85 K/uL    Eos (Absolute) 1.30 (H) 0.00 - 0.51 K/uL    Baso (Absolute) 0.05 0.00 - 0.12 K/uL    Immature Granulocytes (abs) 0.03 0.00 - 0.11 K/uL    NRBC (Absolute) 0.00 K/uL   Comp Metabolic Panel (CMP)    Collection Time: 08/16/18  5:28 AM   Result Value Ref Range    Sodium 144 135 - 145 mmol/L    Potassium 3.7 3.6 - 5.5 mmol/L    Chloride 111 96 - 112 mmol/L    Co2 25 20 - 33 mmol/L    Anion Gap 8.0 0.0 - 11.9    Glucose 92 65 - 99 mg/dL    Bun 11 8 - 22 mg/dL    Creatinine 0.81 0.50 - 1.40 mg/dL    Calcium 9.0 8.5 - 10.5 mg/dL    AST(SGOT) 17 12 - 45 U/L    ALT(SGPT) 11 2 - 50 U/L    Alkaline Phosphatase 78 30 - 99 U/L    Total Bilirubin 0.3 0.1 - 1.5 mg/dL    Albumin 3.2 3.2 - 4.9 g/dL    Total Protein 5.9 (L) 6.0 - 8.2 g/dL    Globulin 2.7 1.9 - 3.5 g/dL    A-G Ratio 1.2 g/dL   HEMOGLOBIN A1C    Collection Time: 08/16/18  5:28 AM   Result Value Ref Range    Glycohemoglobin 6.1 (H) 0.0 - 5.6 %    Est Avg Glucose 128 mg/dL   Lipid Profile (Lipid Panel)    Collection Time: 08/16/18  5:28 AM   Result Value Ref Range    Cholesterol,Tot 93 (L) 100 - 199 mg/dL    Triglycerides 80 0 - 149 mg/dL    HDL 41 >=40 mg/dL    LDL 36 <100 mg/dL   TSH with Reflex to FT4    Collection Time: 08/16/18  5:28 AM   Result Value Ref Range    TSH 5.880 (H) 0.380 - 5.330 uIU/mL   Vitamin D, 25-hydroxy (blood)    Collection Time: 08/16/18  5:28 AM   Result Value Ref Range    25-Hydroxy   Vitamin D 25 40 30 - 100 ng/mL   ESTIMATED GFR    Collection Time: 08/16/18  5:28 AM   Result Value Ref Range    GFR If African American >60 >60 mL/min/1.73 m 2    GFR If Non African American >60 >60 mL/min/1.73 m 2   FREE THYROXINE    Collection Time: 08/16/18  5:28 AM   Result Value Ref Range    Free T-4 1.20 0.53 - 1.43 ng/dL       Current Facility-Administered Medications   Medication Frequency   •  metroNIDAZOLE (FLAGYL) tablet 500 mg Q8HRS   • levoFLOXacin (LEVAQUIN) tablet 500 mg DAILY   • losartan (COZAAR) tablet 25 mg Q DAY   • Respiratory Care per Protocol Continuous RT   • Pharmacy Consult Request ...Pain Management Review 1 Each PRN   • tramadol (ULTRAM) 50 MG tablet 50 mg Q4HRS PRN   • acetaminophen (TYLENOL) tablet 650 mg Q4HRS PRN   • senna-docusate (PERICOLACE or SENOKOT S) 8.6-50 MG per tablet 2 Tab BID    And   • polyethylene glycol/lytes (MIRALAX) PACKET 1 Packet QDAY PRN    And   • magnesium hydroxide (MILK OF MAGNESIA) suspension 30 mL QDAY PRN    And   • bisacodyl (DULCOLAX) suppository 10 mg QDAY PRN   • artificial tears 1.4 % ophthalmic solution 1 Drop PRN   • benzocaine-menthol (CEPACOL) lozenge 1 Lozenge Q2HRS PRN   • hydrALAZINE (APRESOLINE) tablet 25 mg Q8HRS PRN   • mag hydrox-al hydrox-simeth (MAALOX PLUS ES or MYLANTA DS) suspension 20 mL Q2HRS PRN   • ondansetron (ZOFRAN ODT) dispertab 4 mg 4X/DAY PRN    Or   • ondansetron (ZOFRAN) syringe/vial injection 4 mg 4X/DAY PRN   • traZODone (DESYREL) tablet 50 mg QHS PRN   • sodium chloride (OCEAN) 0.65 % nasal spray 2 Spray PRN   • atorvastatin (LIPITOR) tablet 40 mg Q EVENING   • aspirin (ASA) chewable tab 81 mg DAILY   • heparin injection 5,000 Units Q12HRS   • nicotine (NICODERM) 7 MG/24HR 7 mg Daily-0600   • levothyroxine (SYNTHROID) tablet 100 mcg AM ES   • metoprolol SR (TOPROL XL) tablet 25 mg DAILY       Orders Placed This Encounter   Procedures   • Diet Order Regular     Standing Status:   Standing     Number of Occurrences:   1     Order Specific Question:   Diet:     Answer:   Regular [1]     Order Specific Question:   Texture/Fiber modifications:     Answer:   Dysphagia 2(Pureed/Chopped)specify fluid consistency(question 6) [2]     Order Specific Question:   Consistency/Fluid modifications:     Answer:   Nectar Thick [2]     Order Specific Question:   Miscellaneous modifications:     Answer:   SLP - 1:1 Supervision by Nursing  [21]     Order Specific Question:   Miscellaneous modifications:     Answer:   SLP - Deliver to Nursing Station [22]       Assessment:  Active Hospital Problems    Diagnosis   • *Stroke (cerebrum) (HCC)   • Paroxysmal A-fib (HCC)   • Hypertension   • Leukocytosis   • Normocytic anemia   • Hypothyroidism   • Acute kidney injury (HCC)       Medical Decision Making and Plan:  CVA - Patient with left frontal CVA.  Patient started on ASA and Statin  -Continue ASA 81 mg  -PT and OT for mobility and ADLs  -SLP for cognition, speech and swallow. On dysphagia diet with thickened liquids. Continue on the current diet.    HTN/A fib - Patient on metoprolol. Previously on Losartan 100 mg daily. Per cardiology no need for loop recorder  -Continue Metoprolol 25 mg daily. Per Hospitalist restart Losartan 25 mg daily  -Consider AC 2 weeks post-stroke     Smoking - Patient with history of smoking. Will need counselling  -Continue Nicotine patch      Hypothyroidism - Patient previously on 100 mcg.     DVT Ppx - Patient transferred on 5000 U q12H     ANSHU PopeBYakelinS.    Addendum: 8/17/18  The patient was evaluated with the fellow staff.  I was present for and/or performed the interview as well as personally examined the patient.  I reviewed the fellow's note and agree with the fellow's findings and plan as documented in the fellow's note except as documented below. Please reference the fellow's note for complete information.     The chart was reviewed and summarized. Available labs, imaging, O2 sats, and EKGs were reviewed. Available nursing, therapy, consultant, and staff notes were reviewed. I am actively involved in the patient's care.     Brief Discussion and Plan:  Patient evaluated in therapy gym working with OT. No acute complaints.  Discussed care with hospitalist who had concern for elevated WBC.  Concern for UTI vs aspiration.  Checking U/A and started on Levaquin and Flagyl.  Otherwise patient doing well.      Total time:  35 minutes.  I spent greater than 50% of the time for patient care, counseling, and coordination on this date, including unit/floor time, and face-to-face time with the patient as per interval events and assessment and plan above. Topics discussed included increased WBC with concern for possible aspiration.     Melquiades Abbasi M.D.

## 2018-08-18 NOTE — CONSULTS
DATE OF SERVICE:  08/17/2018    BRIEF HISTORY OF PRESENTING COMPLAINTS:  The patient is a 78-year-old white    female who was referred for behavioral medicine evaluation by Dr. Abbasi.  The patient was transferred to rehab from acute where she was   treated for a left-sided CVA with right-sided weakness and aphasia.  Patient   was stabilized acutely and then sent to rehab to address her general debility.    PAST MEDICAL HISTORY:  Significant for tobacco dependence, anemia,   hypertension, leukocytosis, and renal disorder.    PSYCHOLOGICAL STATUS:  MENTAL STATUS EXAMINATION:  The patient is a well-nourished, thinly built   female of short stature who appeared her stated age of 78.  At presentation,   the patient was alert.  She was sitting in her bed when approached.  The   patient oriented fairly well to my presence.  Patient was kempt in appearance.    She was dressed casually in street attire that was appropriate to her age   and setting.  The patient's manner of presentation was cooperative and   friendly.    The patient was oriented to the month, but not precisely to the day of the   month.  She understood that it was 2018.  The patient responded mostly to yes   and no questions given her significant language expression deficits.  Her   language of reception seemed intact.  When the patient spoke, she was   dysarthric.  There is a hesitancy to her speech and some problems with   dysarthria.  Patient's concentration and memory functioning seemed generally   intact.  The patient also was able to engage in abstract reasoning.    The patient's affect was slightly constricted, stable, and mildly intense.    She related well.  Mood appeared mostly frustrated, but appropriate to the   context.    There was no evidence of delusional or perceptual disturbance.  Also, the   patient showed no unusual pain or motor behavior during the interview.    SPECIFIC BEHAVIORAL COMPLAINTS:  Patient denied any clinically  significant   symptoms of a negative mood.  She reported no strong feelings of depression,   anxiety, or anger.  She also reported no pain or any problems with her sleep.    She did mention that her energy level is low and her appetite is diminished.    The patient also reported no interpersonal discord or discomfort, family   disharmony, or problems managing her day-to-day stressors prior to her   admission to the hospital.    Patient reported no ETOH or other drug abuse.  She did admit to tobacco   dependence.    PSYCHIATRIC HISTORY:  The patient denied any history significant for   psychiatric disturbance or treatment including in or outpatient care.    PSYCHOMETRIC TESTING:  The patient was administered 2 psychometric tests and 2   screening instruments.  The PS/PC-R revealed no clinically significant   symptoms of a negative mood.  Her CDR survey showed no problems with level of   consciousness.  However, attention seemed slightly diminished and her thinking   seemed impaired.  Her reasoning is somewhat impoverished.  Patient was   oriented, but not precisely to the date.  Her speech was very slow and there   was significant poverty of speech.  The patient showed problems with word   finding ability.  She spoke in incomplete sentences and there was some   dysarthria.  Patient also reported loss of vigor.  She denied any sleep   problems.  She reported some difficulties with behavioral activation and basic   self-care.  She denied any mood disturbance.  She reported some diminishment   of her appetite.  She denied any pain.    Patient was screened for any elder abuse or risk of suicide.  There is no   strong evidence for either problem.    SOCIAL HISTORY:  The patient is  and retired.  She was living alone in   [place], [place] at the time of her hospitalization.  She has supportive   family members who live nearby her.    IMPRESSION:  Minor adjustment difficulties.    RECOMMENDATIONS:  Patient will be  followed for status and supportive care.       ____________________________________     RAKESH CARTER, PHD    LON / GILBERTO    DD:  08/18/2018 12:49:58  DT:  08/18/2018 14:23:38    D#:  [id number]  Job#:  [id number]

## 2018-08-18 NOTE — PROGRESS NOTES
Hospital Medicine Progress Note, Adult, Complex               Author: Yohannes Wolff Date & Time created: 2018  8:18 AM     Interval History:  No significant events or changes since last visit  Patient denies SOB, cough, or diarrhea  Patient slept ok last night    Chief Complaint:  Hypertension  Recent Tachycardia/afib  Leukocytosis    Review of Systems:  Review of Systems   Unable to perform ROS: Medical condition       Physical Exam:  Physical Exam   Constitutional: She is oriented to person, place, and time.   HENT:   Mouth/Throat: Oropharynx is clear and moist.   Eyes: No scleral icterus.   Cardiovascular: Normal rate, regular rhythm, S1 normal and S2 normal.    Pulmonary/Chest: Effort normal. No stridor. She has no wheezes. She has rales.   Has a few basilar crackles.   Abdominal: Soft. Bowel sounds are normal. Hernia confirmed negative in the right inguinal area and confirmed negative in the left inguinal area.   Neurological: She is alert and oriented to person, place, and time. No sensory deficit.   Has aphasia.   Skin: Skin is warm and dry. She is not diaphoretic. No cyanosis.   Psychiatric: She has a normal mood and affect. Her behavior is normal.   Nursing note and vitals reviewed.      Labs:        Invalid input(s): [id number]      Recent Labs      18   0528   SODIUM  144   POTASSIUM  3.7   CHLORIDE  111   CO2  25   BUN  11   CREATININE  0.81   CALCIUM  9.0     Recent Labs      18   0528   ALTSGPT  11   ASTSGOT  17   ALKPHOSPHAT  78   TBILIRUBIN  0.3   GLUCOSE  92     Recent Labs      18   0528   RBC  3.48*   HEMOGLOBIN  10.4*   HEMATOCRIT  32.0*   PLATELETCT  238     Recent Labs      18   0528   WBC  11.5*   NEUTSPOLYS  40.80*   LYMPHOCYTES  41.50*   MONOCYTES  5.70   EOSINOPHILS  11.30*   BASOPHILS  0.40   ASTSGOT  17   ALTSGPT  11   ALKPHOSPHAT  78   TBILIRUBIN  0.3           Hemodynamics:  Temp (24hrs), Av.9 °C (98.5 °F), Min:36.7 °C (98 °F), Max:37.2 °C (99  °F)  Temperature: 37.2 °C (99 °F)  Pulse  Av.5  Min: 56  Max: 65   Blood Pressure : 117/71     Respiratory:    Respiration: 17, Pulse Oximetry: 93 %        RML Breath Sounds: Clear, RLL Breath Sounds: Clear, LLL Breath Sounds: Clear  Fluids:    Intake/Output Summary (Last 24 hours) at 18 0818  Last data filed at 18 0524   Gross per 24 hour   Intake              700 ml   Output              200 ml   Net              500 ml        GI/Nutrition:  Orders Placed This Encounter   Procedures   • Diet Order Regular     Standing Status:   Standing     Number of Occurrences:   1     Order Specific Question:   Diet:     Answer:   Regular [1]     Order Specific Question:   Texture/Fiber modifications:     Answer:   Dysphagia 2(Pureed/Chopped)specify fluid consistency(question 6) [2]     Order Specific Question:   Consistency/Fluid modifications:     Answer:   Nectar Thick [2]     Order Specific Question:   Miscellaneous modifications:     Answer:   SLP - 1:1 Supervision by Nursing [21]     Order Specific Question:   Miscellaneous modifications:     Answer:   SLP - Deliver to Nursing Station [22]     Medical Decision Making, by Problem:  Active Hospital Problems    Diagnosis   • *Stroke (cerebrum) (HCC) [I63.9]   • Paroxysmal A-fib (HCC) [I48.0]   • Hypertension [I10]   • Leukocytosis [D72.829]   • Normocytic anemia [D64.9]   • Hypothyroidism [E03.9]   • Acute kidney injury (HCC) [N17.9]     *  S/P CVA  Involving the left frontal area with petechial hemorrhage  With aphasia    *  Hypertension  BP: ok  On Cozaar: 25 mg daily ()  On Toprol XL: 25 mg daily  Cont to monitor    *  ? Afib  No good documentation for afib seen at [facility]  HR: ok but occ dips a little lower  On Toprol XL: 25 mg daily  Cont to monitor for now    *  Leukocytosis  WBC's: 9.5 () --> 11.5 ()  Denies cough  Has been afebrile  CXR ():   U/A: mod LE with 20-50 wbc's, few bacteria  On Levaquin & Flagyl (thru )  Consider repeat U/A  in a few days (now on Levaquin)  Note: the speech therapist at [facility] saw her and noted she may be aspirating thin liquids    *  Hypo-Th  TSH: 5.88  FT4: 1.20  Suggest repeat TFTs when stable as an out patient    *  ? CKD  *  Hysterectomy  *  Carpal Tunnel Surgery      Quality-Core Measures   Reviewed items::  Labs reviewed and Medications reviewed

## 2018-08-18 NOTE — PROGRESS NOTES
Received shift report and assumed care of patient.  Patient awake, calm and stable, up into wheelchair for meal. call light within reach.  Denies pain or discomfort at this time.  Will continue to monitor.

## 2018-08-18 NOTE — CARE PLAN
Problem: Safety  Goal: Will remain free from injury  Pt is min assist with transfers. Bed alarms in place and activated for pt's safety. Pt is impulsive and multiple attempts out of bed without using call light. Encouraged and reminded pt to use call light for assistance, pt verbalized understanding. Call light and belongings are within reach. Will continue to monitor.    Problem: Infection  Goal: Will remain free from infection  WBC elevated. Pt on PO ABT, no adverse reaction noted.

## 2018-08-18 NOTE — PROGRESS NOTES
Received bedside shift report from regarding patient and assumed care. Patient is awake, calm and stable, currently positioned in bed for comfort and safety; call light within reach. Denies any pain of discomfort at this time. Will continue to monitor.

## 2018-08-18 NOTE — CARE PLAN
Problem: Infection  Goal: Will remain free from infection  Outcome: PROGRESSING SLOWER THAN EXPECTED  Patient is on Flagyl and Levaquin for elevated WBC. No s/s of adverse reaction. Will continue to monitor.     Problem: Bowel/Gastric:  Goal: Normal bowel function is maintained or improved  Outcome: PROGRESSING AS EXPECTED  Patient refused stool softener this am. Denied any s/s of constipation. Last BM was on 8/17.

## 2018-08-19 NOTE — PROGRESS NOTES
Hospital Medicine Progress Note, Adult, Complex               Author: Yohannes Wolff Date & Time created: 2018  8:35 AM     Interval History:  No significant events or changes since last visit  Patient denies SOB, cough, or diarrhea  Patient slept ok last night    Chief Complaint:  Hypertension  Recent Tachycardia/afib  Leukocytosis    Review of Systems:  Review of Systems   Unable to perform ROS: Medical condition       Physical Exam:  Physical Exam   Constitutional: She is oriented to person, place, and time. No distress.   HENT:   Mouth/Throat: No oropharyngeal exudate.   Eyes: EOM are normal.   Neck: No JVD present. No tracheal deviation present.   Cardiovascular: Normal rate, regular rhythm, S1 normal and S2 normal.    Pulmonary/Chest: Effort normal. She has no wheezes. She has rales.   Has a few basilar crackles.   Abdominal: Soft. She exhibits no distension. There is no tenderness. Hernia confirmed negative in the right inguinal area and confirmed negative in the left inguinal area.   Neurological: She is alert and oriented to person, place, and time. No sensory deficit.   Has aphasia.   Skin: Skin is warm and dry. No cyanosis.   Psychiatric: She has a normal mood and affect. Her behavior is normal.   Nursing note and vitals reviewed.      Labs:        Invalid input(s): [id number]      No results for input(s): SODIUM, POTASSIUM, CHLORIDE, CO2, BUN, CREATININE, MAGNESIUM, PHOSPHORUS, CALCIUM in the last 72 hours.  No results for input(s): ALTSGPT, ASTSGOT, ALKPHOSPHAT, TBILIRUBIN, DBILIRUBIN, GAMMAGT, AMYLASE, LIPASE, ALB, PREALBUMIN, GLUCOSE in the last 72 hours.  No results for input(s): RBC, HEMOGLOBIN, HEMATOCRIT, PLATELETCT, PROTHROMBTM, APTT, INR, IRON, FERRITIN, TOTIRONBC in the last 72 hours.            Hemodynamics:  Temp (24hrs), Av.7 °C (98.1 °F), Min:36.6 °C (97.8 °F), Max:36.9 °C (98.5 °F)  Temperature: 36.6 °C (97.8 °F)  Pulse  Av.6  Min: 56  Max: 65   Blood Pressure : 145/75      Respiratory:    Respiration: 16, Pulse Oximetry: 93 %        RML Breath Sounds: Clear, RLL Breath Sounds: Clear, LLL Breath Sounds: Clear  Fluids:    Intake/Output Summary (Last 24 hours) at 08/19/18 0835  Last data filed at 08/19/18 0500   Gross per 24 hour   Intake             1220 ml   Output                0 ml   Net             1220 ml        GI/Nutrition:  Orders Placed This Encounter   Procedures   • Diet Order Regular     Standing Status:   Standing     Number of Occurrences:   1     Order Specific Question:   Diet:     Answer:   Regular [1]     Order Specific Question:   Texture/Fiber modifications:     Answer:   Dysphagia 2(Pureed/Chopped)specify fluid consistency(question 6) [2]     Order Specific Question:   Consistency/Fluid modifications:     Answer:   Nectar Thick [2]     Order Specific Question:   Miscellaneous modifications:     Answer:   SLP - 1:1 Supervision by Nursing [21]     Order Specific Question:   Miscellaneous modifications:     Answer:   SLP - Deliver to Nursing Station [22]     Medical Decision Making, by Problem:  Active Hospital Problems    Diagnosis   • *Stroke (cerebrum) ([facility]) [I63.9]   • Paroxysmal A-fib (HCC) [I48.0]   • Hypertension [I10]   • Leukocytosis [D72.829]   • Normocytic anemia [D64.9]   • Hypothyroidism [E03.9]   • Acute kidney injury (HCC) [N17.9]     *  S/P CVA  Involving the left frontal area with petechial hemorrhage  With aphasia    *  Hypertension  BP has been ok but stephy up a little recently   On Cozaar: 25 mg daily (8/16)  On Toprol XL: 25 mg daily  Will monitor another day before adjusting meds    *  ? Afib  No good documentation for afib seen at [facility]  HR: 60's  On Toprol XL: 25 mg daily  Cont to monitor for now    *  Leukocytosis  WBC's: 9.5 (8/14) --> 11.5 (8/16)  Denies cough  Has been afebrile  CXR (8/16):   F/U U/A  On Levaquin & Flagyl (thru 8/21)  Note: the speech therapist at [facility] saw her and noted she may be aspirating thin liquids    *  Hypo-Th  TSH:  5.88  FT4: 1.20  Suggest repeat TFTs when stable as an out patient    *  ? CKD  *  Hysterectomy  *  Carpal Tunnel Surgery      Quality-Core Measures   Reviewed items::  Labs reviewed and Medications reviewed

## 2018-08-19 NOTE — CARE PLAN
Problem: Balance  Goal: STG-Within one week, patient will maintain static standing  1) Individualized goal:  Unsupported for 2 min with SPV  2) Interventions:  PT Group Therapy, PT Gait Training, PT Therapeutic Exercises, PT Neuro Re-Ed/Balance, PT Therapeutic Activity and PT Evaluation     Outcome: MET Date Met: 08/19/18  Assessed during SAWYER    Problem: Mobility  Goal: STG-Within one week, patient will ambulate household distance  1) Individualized goal:  Without AD with SPV without LOB  2) Interventions: PT Group Therapy, PT Gait Training, PT Therapeutic Exercises, PT Neuro Re-Ed/Balance, PT Therapeutic Activity and PT Evaluation       Outcome: NOT MET  CGA/min A  Goal: STG-Within one week, patient will ascend and descend four to six stairs  1) Individualized goal:  With single HR with SBA and occ cues for sequencing/safety as needed  2) Interventions:PT Group Therapy, PT Gait Training, PT Therapeutic Exercises, PT Neuro Re-Ed/Balance, PT Therapeutic Activity and PT Evaluation         10 stairs BHR CGA    Problem: Mobility Transfers  Goal: STG-Within one week, patient will transfer bed to chair  1) Individualized goal:  independently  2) Interventions: PT Group Therapy, PT Gait Training, PT Therapeutic Exercises, PT Neuro Re-Ed/Balance, PT Therapeutic Activity and PT Evaluation       Outcome: NOT MET  CGA/SBA

## 2018-08-19 NOTE — REHAB-PT IDT TEAM NOTE
Physical Therapy   Mobility  Bed mobility:  SBA  Bed /Chair/Wheelchair Transfer Initial:  4 - Minimal Assistance  Bed /Chair/Wheelchair Transfer Current:  5 - Standby Prompting/Supervision or Set-up   Bed/Chair/Wheelchair Transfer Description:  Adaptive equipment, Increased time, Supervision for safety, Verbal cueing, Set-up of equipment (Bed mobility and SPT with SBA)  Walk Initial:  4 - Minimal Assistance  Walk Current:  4 - Minimal Assistance   Walk Description:  Verbal cueing, Requires incidental assist, Extra time, Safety concerns (400' x 1 and 200' x 2 no AD, gait belt with min/steadying assist)  Wheelchair Initial:  2 - Max Assistance  Wheelchair Current:  2 - Max Assistance   Wheelchair Description:   ([place] for steering, 120' with BUEs)  Stairs Initial:  2 - Max Assistance  Stairs Current: 2 - Max Assistance   Stairs Description:  (CGA 10 stairs with BHRs)  Patient/Family Training/Education:  Patient educated on safety with mobility, POC/ rehab expectations.  DME/DC Recommendations: TBD, no DME anticipated at this time/ owns 4WW  Strengths:  Willingly participates in therapeutic activities and Other: Senior living/ no stairs, PLOF  Barriers:   Aphasia expressive, Decreased endurance, Poor activity tolerance, Poor balance and Other: Dec motor control   # of short term goals set= 3  # of short term goals met= 1       Physical Therapy Problems           Problem: Mobility     Dates: Start: 08/16/18       Goal: STG-Within one week, patient will ambulate household distance     Dates: Start: 08/16/18       Description: 1) Individualized goal:  Without AD with SPV without LOB  2) Interventions: PT Group Therapy, PT Gait Training, PT Therapeutic Exercises, PT Neuro Re-Ed/Balance, PT Therapeutic Activity and PT Evaluation         Note:     Goal Note filed on 08/19/18 1326 by Karuna Marrero DPT    Goal: STG-Within one week, patient will ambulate household distance  Outcome: NOT MET  CGA/min A                Goal:  STG-Within one week, patient will ascend and descend four to six stairs     Dates: Start: 08/16/18       Description: 1) Individualized goal:  With single HR with SBA and occ cues for sequencing/safety as needed  2) Interventions:PT Group Therapy, PT Gait Training, PT Therapeutic Exercises, PT Neuro Re-Ed/Balance, PT Therapeutic Activity and PT Evaluation           Note:     Goal Note filed on 08/19/18 1326 by Karuna Marrero DPT    Goal: STG-Within one week, patient will ascend and descend four to six   stairs  10 stairs BHR CGA                  Problem: Mobility Transfers     Dates: Start: 08/16/18       Goal: STG-Within one week, patient will transfer bed to chair     Dates: Start: 08/16/18       Description: 1) Individualized goal:  independently  2) Interventions: PT Group Therapy, PT Gait Training, PT Therapeutic Exercises, PT Neuro Re-Ed/Balance, PT Therapeutic Activity and PT Evaluation         Note:     Goal Note filed on 08/19/18 1326 by Karuna Marrero DPT    Goal: STG-Within one week, patient will transfer bed to chair  Outcome: NOT MET  CGA/SBA                  Problem: PT-Long Term Goals     Dates: Start: 08/16/18       Goal: LTG-By discharge, patient will tolerate standing     Dates: Start: 08/16/18       Description: 1) Individualized goal:  5 min unsupported without signs/symptoms of hypoxia or LOB  2) Interventions: PT Group Therapy, PT Gait Training, PT Therapeutic Exercises, PT Neuro Re-Ed/Balance, PT Therapeutic Activity and PT Evaluation                 Goal: LTG-By discharge, patient will ambulate     Dates: Start: 08/16/18       Description: 1) Individualized goal:  500' without AD on indoor and outdoor surfaces Flash.  2) Interventions:  PT Group Therapy, PT Gait Training, PT Therapeutic Exercises, PT Neuro Re-Ed/Balance, PT Therapeutic Activity and PT Evaluation                 Goal: LTG-By discharge, patient will ambulate up/down 4-6 stairs     Dates: Start: 08/16/18       Description: 1)  Individualized goal:  With single HR SPV  2) Interventions:  PT Group Therapy, PT Gait Training, PT Therapeutic Exercises, PT Neuro Re-Ed/Balance, PT Therapeutic Activity and PT Evaluation                 Goal: LTG-By discharge, patient will transfer in/out of a car     Dates: Start: 08/16/18       Description: 1) Individualized goal:  Flash  2) Interventions:  PT Group Therapy, PT Gait Training, PT Therapeutic Exercises, PT Neuro Re-Ed/Balance, PT Therapeutic Activity and PT Evaluation                         Section completed by:  Karuna Marrero PT, DPT

## 2018-08-19 NOTE — CARE PLAN
Problem: Safety  Goal: Will remain free from injury    Intervention: Provide assistance with mobility  Patient with right sided weakness, assisted with transfers to prevent falls.      Problem: Infection  Goal: Will remain free from infection    Intervention: Assess signs and symptoms of infection  Patient on antibiotics for UTI will check urinalysis as ordered.

## 2018-08-19 NOTE — CARE PLAN
Problem: Communication  Goal: The ability to communicate needs accurately and effectively will improve  Pt is aphasic, but able to make needs known.     Problem: Safety  Goal: Will remain free from injury  Pt can be impulsive. Does not use call light for assistance. Bed alarms activated. Call light within reach. Will continue with hourly rounds.

## 2018-08-20 NOTE — CARE PLAN
Problem: Swallowing STGs  Goal: STG-Within one week, patient will safely swallow  1) Individualized goal:  Therapeutic trials of thin liquids ( in isolation) and dysphagia 3 diet textures with no s/sx of aspiration for 2/2 meals.  2) Interventions:  SLP Swallowing Dysfunction Treatment, SLP Video Swallow Evaluation and SLP Group Treatment     Outcome: NOT MET  Partially met.  Patient has tolerated trials of thin liquid for 2 trials and is ready to advance to thin liquids.  Dysphagia 3 trials not yet completed.  Goal: STG-Within one week, patient will  1) Individualized goal:  Participate in MBSS with goals as appropriate.  2) Interventions:  SLP Swallowing Dysfunction Treatment and SLP Video Swallow Evaluation     Outcome: NOT MET  MBSS is currently unavailable due to technical difficulties.  Will schedule when equipement is available.    Problem: Expression STGs  Goal: STG-Within one week, patient will  1) Individualized goal:  Describe sequences with 80% word finding and use of compensatory word finding strategies with min cues.  2) Interventions:  SLP Speech Language Treatment and SLP Group Treatment     Outcome: NOT MET  Not yet addressed.    Problem: Comprehension STGs  Goal: STG-Within one week, patient will  1) Individualized goal:  Administer RCBA with goals as appropriate.  2) Interventions:  SLP Speech Language Treatment     Outcome: NOT MET  In progress, but not yet complete.  RCBA initiated:  Subtest 1 word-visual: 100%, Subtest 2 word auditory: 100%.  Subtest 3 word-semantic: 90%. Subtest 4 functional readin%.   Subtest 5 synonyms 60%. Subtest 6 sentence-picture: 90%.  Subtest 7 paragraph- picture: 40%.  Remainder of test, to be completed.

## 2018-08-20 NOTE — PROGRESS NOTES
Hospital Medicine Progress Note, Adult, Complex               Author: Yohannes Diazchanning Date & Time created: 8/20/2018  8:50 AM     Interval History:  No significant events or changes since last visit  Patient denies SOB, cough, or diarrhea  Patient slept ok last night    Chief Complaint:  Hypertension  Recent Tachycardia/afib  Leukocytosis    Review of Systems:  Review of Systems   Unable to perform ROS: Medical condition       Physical Exam:  Physical Exam   Constitutional: She is oriented to person, place, and time. She appears well-nourished.   HENT:   Head: Atraumatic.   Eyes: Pupils are equal, round, and reactive to light. Conjunctivae are normal.   Neck: Normal range of motion. Neck supple.   Cardiovascular: Normal rate, regular rhythm, S1 normal and S2 normal.    No murmur heard.  Pulmonary/Chest: Effort normal. She has no wheezes. She has rales.   Has a few basilar crackles.   Abdominal: Soft. She exhibits no distension. There is no tenderness. Hernia confirmed negative in the right inguinal area and confirmed negative in the left inguinal area.   Musculoskeletal: She exhibits no edema.   Neurological: She is alert and oriented to person, place, and time. No sensory deficit.   Has aphasia.   Skin: Skin is warm and dry. No rash noted. No cyanosis.   Psychiatric: She has a normal mood and affect. Her behavior is normal.   Nursing note and vitals reviewed.      Labs:        Invalid input(s): [id number]      Recent Labs      08/20/18   0528   SODIUM  140   POTASSIUM  3.7   CHLORIDE  106   CO2  26   BUN  11   CREATININE  1.08   MAGNESIUM  1.6   PHOSPHORUS  3.1   CALCIUM  9.5     Recent Labs      08/20/18   0528   GLUCOSE  90     Recent Labs      08/20/18   0528   RBC  3.77*   HEMOGLOBIN  11.4*   HEMATOCRIT  34.6*   PLATELETCT  228     Recent Labs      08/20/18   0528   WBC  11.1*   NEUTSPOLYS  48.80   LYMPHOCYTES  34.20   MONOCYTES  5.60   EOSINOPHILS  10.60*   BASOPHILS  0.40           Hemodynamics:  Temp  (24hrs), Av.7 °C (98.1 °F), Min:36.4 °C (97.6 °F), Max:37 °C (98.6 °F)  Temperature: 36.4 °C (97.6 °F)  Pulse  Av.8  Min: 56  Max: 67   Blood Pressure : 133/66     Respiratory:    Respiration: 18, Pulse Oximetry: 91 %        RML Breath Sounds: Clear, RLL Breath Sounds: Clear, LLL Breath Sounds: Clear  Fluids:    Intake/Output Summary (Last 24 hours) at 18 0850  Last data filed at 18 0514   Gross per 24 hour   Intake             1060 ml   Output              400 ml   Net              660 ml        GI/Nutrition:  Orders Placed This Encounter   Procedures   • Diet Order Regular     Standing Status:   Standing     Number of Occurrences:   1     Order Specific Question:   Diet:     Answer:   Regular [1]     Order Specific Question:   Texture/Fiber modifications:     Answer:   Dysphagia 2(Pureed/Chopped)specify fluid consistency(question 6) [2]     Order Specific Question:   Consistency/Fluid modifications:     Answer:   Nectar Thick [2]     Order Specific Question:   Miscellaneous modifications:     Answer:   SLP - 1:1 Supervision by Nursing [21]     Order Specific Question:   Miscellaneous modifications:     Answer:   SLP - Deliver to Nursing Station [22]     Medical Decision Making, by Problem:  Active Hospital Problems    Diagnosis   • *Stroke (cerebrum) (HCC) [I63.9]   • Paroxysmal A-fib (HCC) [I48.0]   • Hypertension [I10]   • Leukocytosis [D72.829]   • Normocytic anemia [D64.9]   • Hypothyroidism [E03.9]   • Acute kidney injury (HCC) [N17.9]     *  S/P CVA  Involving the left frontal area with petechial hemorrhage  With aphasia    *  Hypertension  BP ok   On Cozaar: 25 mg daily ()  On Toprol XL: 25 mg daily  Cont to monitor    *  ? Afib  No good documentation for afib seen at [facility]  HR: generally ok but occ dips a little lower  On Toprol XL: 25 mg daily  Cont to monitor for now    *  Leukocytosis  WBC's: 9.5 () --> 11.5 () --> 11.1 ()  Denies cough  Has been afebrile  CXR ():  hazy bibasilar opacities may represent atelectasis; pneumonitis not excluded  U/A: negative  Likely aspiration pneumonia: the speech therapist at [facility] saw her and noted she may be aspirating thin liquids  On Levaquin & Flagyl (thru 8/21)    *  Hypo-Th  TSH: 5.88  FT4: 1.20  Subclinical picture  Suggest repeat TFTs when stable as an out patient    *  ? CKD  *  Hysterectomy  *  Carpal Tunnel Surgery      Quality-Core Measures   Reviewed items::  Labs reviewed and Medications reviewed

## 2018-08-20 NOTE — REHAB-OT IDT TEAM NOTE
Occupational Therapy   Activities of Daily Living  Eating Initial:  1 - Total Assistance  Eating Current:  5 - Standby Prompting/Supervision or Set-up   Eating Description:  Increased time, Modified diet, Supervision for safety, Verbal cueing  Grooming Initial:  5 - Standby Prompting/Supervision or Set-up  Grooming Current:  5 - Standby Prompting/Supervision or Set-up   Grooming Description:  Supervision for safety, Verbal cueing  Bathing Initial:  4 - Minimal Assistance  Bathing Current:  5 - Standby Prompting/Supervision or Set-up   Bathing Description:  Grab bar, Hand held shower, Supervision for safety, Verbal cueing, Set-up of equipment (pt needed cues to sit for LB washing and not to stand on 1 foot w/ GB d/t fall risk, pt receptive and comlpinat with request.)  Upper Body Dressing Initial:  5 - Standby Prompting/Supervision or Set-up  Upper Body Dressing Current:  6 - Modified Independent   Upper Body Dressing Description:     Lower Body Dressing Initial:  4 - Minimal Assistance  Lower Body Dressing Current:  4 - Minimal Assistance Pt LOB posterior needs CGA in standing   Lower Body Dressing Description:  4 - Minimal Assistance  Toileting Initial:  4 - Minimal Assistance  Toileting Current:  4 - Minimal Assistance   Toileting Description:  Supervision for safety, Verbal cueing, Grab bar  Toilet Transfer Initial:  4 - Minimal Assistance  Toilet Transfer Current:  4 - Minimal Assistance   Toilet Transfer Description:  4 - Minimal Assistance  Tub / Shower Transfer Initial:  4 - Minimal Assistance  Tub / Shower Transfer Current:  5 - Standby Prompting/Supervision or Set-up   Tub / Shower Transfer Description:  Supervision for safety, Verbal cueing, Grab bar  IADL:  Completed coffee and laundry with SPV and v/c for tasks   Family Training/Education:  TBD   DME/DC Recommendations:  GB shower and toilet, CGA for transfers and standing LB tasks    Strengths:  Able to follow instructions, Adequate strength, Alert and  oriented, Good carryover of learning, Good endurance, Good insight into deficits/needs, Independent PLOF, Making steady progress towards goals, Manages pain appropriately, Motivated for self care and independence, Pleasant and cooperative, Supportive family and Willingly participates in therapeutic activities  Barriers:  Aphasia expressive, Decreased endurance, Generalized weakness and Poor balance     # of short term goals set= 3    # of short term goals met=0 (3 in progress)         Occupational Therapy Goals           Problem: Dressing     Dates: Start: 08/16/18       Goal: STG-Within one week, patient will dress LB     Dates: Start: 08/16/18       Description: 1) Individualized Goal:  With SPV, DME and AE prn  2) Interventions: OT E Stim Attended, OT Group Therapy, OT Self Care/ADL, OT Cognitive Skill Dev, OT Community Reintegration, OT Manual Ther Technique, OT Neuro Re-Ed/Balance, OT Sensory Int Techniques, OT Therapeutic Activity, OT Evaluation and OT Therapeutic Exercise.               Problem: Functional Transfers     Dates: Start: 08/16/18       Goal: STG-Within one week, patient will transfer to toilet     Dates: Start: 08/16/18       Description: 1) Individualized Goal:  With SPV, DME and AE prn  2) Interventions: OT E Stim Attended, OT Group Therapy, OT Self Care/ADL, OT Cognitive Skill Dev, OT Community Reintegration, OT Manual Ther Technique, OT Neuro Re-Ed/Balance, OT Sensory Int Techniques, OT Therapeutic Activity, OT Evaluation and OT Therapeutic Exercise.             Problem: OT Long Term Goals     Dates: Start: 08/16/18       Goal: LTG-By discharge, patient will complete basic self care tasks     Dates: Start: 08/16/18       Description: 1) Individualized Goal:  With Mod I, DME and AE prn  2) Interventions: OT E Stim Attended, OT Group Therapy, OT Self Care/ADL, OT Cognitive Skill Dev, OT Community Reintegration, OT Manual Ther Technique, OT Neuro Re-Ed/Balance, OT Sensory Int Techniques, OT  Therapeutic Activity, OT Evaluation and OT Therapeutic Exercise.               Goal: LTG-By discharge, patient will perform bathroom transfers     Dates: Start: 08/16/18       Description: 1) Individualized Goal:  With SPV, DME and AE prn  2) Interventions: OT E Stim Attended, OT Group Therapy, OT Self Care/ADL, OT Cognitive Skill Dev, OT Community Reintegration, OT Manual Ther Technique, OT Neuro Re-Ed/Balance, OT Sensory Int Techniques, OT Therapeutic Activity, OT Evaluation and OT Therapeutic Exercise.                 Problem: Toileting     Dates: Start: 08/16/18       Goal: STG-Within one week, patient will complete toileting tasks     Dates: Start: 08/16/18       Description: 1) Individualized Goal:  With SPV, DME and AE prn  2) Interventions: OT E Stim Attended, OT Group Therapy, OT Self Care/ADL, OT Cognitive Skill Dev, OT Community Reintegration, OT Manual Ther Technique, OT Neuro Re-Ed/Balance, OT Sensory Int Techniques, OT Therapeutic Activity, OT Evaluation and OT Therapeutic Exercise.                     Section completed by:  Lilliana Orr, OT

## 2018-08-20 NOTE — DISCHARGE PLANNING
Case management  Met with patient to discuss IDT conference from today, also called patient's daughter, Arpita with update from conference and tentative dc date of 8/27/2018. Life alert info let with patient at bedside. Arpita states she will be staying with patient at her home in the evenings and at night after discharge, and will have friends with her during the day. She is getting a freedom alert system for patient, similar to the the life alert. Patient has a walker at home.

## 2018-08-20 NOTE — PROGRESS NOTES
"Rehab Progress Note     Encounter Date: 8/20/2018    CC: Aphasia  Dysarthria     Interval Events (Subjective)  Patient sitting up in wheelchair. She is a little more talkative this morning but still hesitant. Discussed that we will have IDT this afternoon and decide length of stay. Patient reports she is looking forward to more therapy. Otherwise she denies pain.  She has some inability to urinate and sometimes gets the urge rapidly. UA was checked last night and was negative for bacteria.  Denies N/V/D.      IDT Team Meeting 8/20/2018    Melquiades SEAMAN M.D., was present and led the interdisciplinary team conference on 8/20/2018.       RN:  Diet Dysphagia 2; thin   % Meal     Pain    Sleep    Bowel Continent   Bladder Continent   In's & Out's    Improved speaking; VS stable  No complaints of pain    PT:  Bed Mobility    Transfers    Mobility FWW; 40/56 on Jignesh balance; maxA with wheelchair   Stairs SBA 12 - 6 inch stairs     OT:  Eating    Grooming    Bathing [place]   UB Dressing    LB Dressing [place]   Toileting [place]   Shower & Transfer    Loss of balance with focused tasks   Worked on [employer]  iADL tasks over the weekend; needs cues for safety     SLP:  Dysphagia 2 with thin liquids; will advance quickly most likely  Min-mod cues for swallow strategy  Anomic aphasia with anxiety for making errors  ModA for reading     CM:  Continues to work on disposition and DME needs.      DC/Disposition:  8/27/18       Objective:  VITAL SIGNS: /66   Pulse 62 Comment: radial  Temp 36.4 °C (97.6 °F)   Resp 18   Ht 1.626 m (5' 4\")   Wt 61 kg (134 lb 8 oz)   SpO2 91%   Breastfeeding? No   BMI 23.09 kg/m²   Gen: NAD  Psych: Mood and affect appropriate  CV: RRR, no edema  Resp: CTAB, no upper airway sounds  Abd: NTND  Neuro: AOx3, anomic aphasia, longer sentences of 4-5 words    Recent Results (from the past 72 hour(s))   URINALYSIS    Collection Time: 08/17/18  9:15 PM   Result Value Ref Range    Color " Yellow     Character Cloudy (A)     Specific Gravity 1.019 <1.035    Ph 5.0 5.0 - 8.0    Glucose Negative Negative mg/dL    Ketones Negative Negative mg/dL    Protein Negative Negative mg/dL    Bilirubin Negative Negative    Urobilinogen, Urine 0.2 Negative    Nitrite Negative Negative    Leukocyte Esterase Moderate (A) Negative    Occult Blood Negative Negative    Micro Urine Req Microscopic    URINE MICROSCOPIC (W/UA)    Collection Time: 08/17/18  9:15 PM   Result Value Ref Range    WBC 20-50 (A) /hpf    RBC 0-2 /hpf    Bacteria Few (A) None /hpf    Epithelial Cells Few /hpf    Ca Oxalate Crystal Few /hpf    Hyaline Cast 3-5 (A) /lpf   URINALYSIS    Collection Time: 08/20/18 12:27 AM   Result Value Ref Range    Color Yellow     Character Clear     Specific Gravity 1.010 <1.035    Ph 5.5 5.0 - 8.0    Glucose Negative Negative mg/dL    Ketones Negative Negative mg/dL    Protein Negative Negative mg/dL    Bilirubin Negative Negative    Urobilinogen, Urine 0.2 Negative    Nitrite Negative Negative    Leukocyte Esterase Small (A) Negative    Occult Blood Negative Negative    Micro Urine Req Microscopic    URINE MICROSCOPIC (W/UA)    Collection Time: 08/20/18 12:27 AM   Result Value Ref Range    WBC 10-20 (A) /hpf    RBC 0-2 /hpf    Bacteria Negative None /hpf    Epithelial Cells Few /hpf    Hyaline Cast 0-2 /lpf   BASIC METABOLIC PANEL    Collection Time: 08/20/18  5:28 AM   Result Value Ref Range    Sodium 140 135 - 145 mmol/L    Potassium 3.7 3.6 - 5.5 mmol/L    Chloride 106 96 - 112 mmol/L    Co2 26 20 - 33 mmol/L    Glucose 90 65 - 99 mg/dL    Bun 11 8 - 22 mg/dL    Creatinine 1.08 0.50 - 1.40 mg/dL    Calcium 9.5 8.5 - 10.5 mg/dL    Anion Gap 8.0 0.0 - 11.9   CBC WITH DIFFERENTIAL    Collection Time: 08/20/18  5:28 AM   Result Value Ref Range    WBC 11.1 (H) 4.8 - 10.8 K/uL    RBC 3.77 (L) 4.20 - 5.40 M/uL    Hemoglobin 11.4 (L) 12.0 - 16.0 g/dL    Hematocrit 34.6 (L) 37.0 - 47.0 %    MCV 91.8 81.4 - 97.8 fL     MCH 30.2 27.0 - 33.0 pg    MCHC 32.9 (L) 33.6 - 35.0 g/dL    RDW 50.5 (H) 35.9 - 50.0 fL    Platelet Count 228 164 - 446 K/uL    MPV 11.2 9.0 - 12.9 fL    Neutrophils-Polys 48.80 44.00 - 72.00 %    Lymphocytes 34.20 22.00 - 41.00 %    Monocytes 5.60 0.00 - 13.40 %    Eosinophils 10.60 (H) 0.00 - 6.90 %    Basophils 0.40 0.00 - 1.80 %    Immature Granulocytes 0.40 0.00 - 0.90 %    Nucleated RBC 0.00 /100 WBC    Neutrophils (Absolute) 5.42 2.00 - 7.15 K/uL    Lymphs (Absolute) 3.79 1.00 - 4.80 K/uL    Monos (Absolute) 0.62 0.00 - 0.85 K/uL    Eos (Absolute) 1.18 (H) 0.00 - 0.51 K/uL    Baso (Absolute) 0.04 0.00 - 0.12 K/uL    Immature Granulocytes (abs) 0.04 0.00 - 0.11 K/uL    NRBC (Absolute) 0.00 K/uL   MAGNESIUM    Collection Time: 08/20/18  5:28 AM   Result Value Ref Range    Magnesium 1.6 1.5 - 2.5 mg/dL   PHOSPHORUS    Collection Time: 08/20/18  5:28 AM   Result Value Ref Range    Phosphorus 3.1 2.5 - 4.5 mg/dL   ESTIMATED GFR    Collection Time: 08/20/18  5:28 AM   Result Value Ref Range    GFR If  59 (A) >60 mL/min/1.73 m 2    GFR If Non African American 49 (A) >60 mL/min/1.73 m 2       Current Facility-Administered Medications   Medication Frequency   • metroNIDAZOLE (FLAGYL) tablet 500 mg Q8HRS   • levoFLOXacin (LEVAQUIN) tablet 500 mg DAILY   • losartan (COZAAR) tablet 25 mg DAILY   • Respiratory Care per Protocol Continuous RT   • Pharmacy Consult Request ...Pain Management Review 1 Each PRN   • tramadol (ULTRAM) 50 MG tablet 50 mg Q4HRS PRN   • acetaminophen (TYLENOL) tablet 650 mg Q4HRS PRN   • senna-docusate (PERICOLACE or SENOKOT S) 8.6-50 MG per tablet 2 Tab BID    And   • polyethylene glycol/lytes (MIRALAX) PACKET 1 Packet QDAY PRN    And   • magnesium hydroxide (MILK OF MAGNESIA) suspension 30 mL QDAY PRN    And   • bisacodyl (DULCOLAX) suppository 10 mg QDAY PRN   • artificial tears 1.4 % ophthalmic solution 1 Drop PRN   • benzocaine-menthol (CEPACOL) lozenge 1 Lozenge Q2HRS PRN   •  hydrALAZINE (APRESOLINE) tablet 25 mg Q8HRS PRN   • mag hydrox-al hydrox-simeth (MAALOX PLUS ES or MYLANTA DS) suspension 20 mL Q2HRS PRN   • ondansetron (ZOFRAN ODT) dispertab 4 mg 4X/DAY PRN    Or   • ondansetron (ZOFRAN) syringe/vial injection 4 mg 4X/DAY PRN   • traZODone (DESYREL) tablet 50 mg QHS PRN   • sodium chloride (OCEAN) 0.65 % nasal spray 2 Spray PRN   • atorvastatin (LIPITOR) tablet 40 mg Q EVENING   • aspirin (ASA) chewable tab 81 mg DAILY   • heparin injection 5,000 Units Q12HRS   • nicotine (NICODERM) 7 MG/24HR 7 mg Daily-0600   • levothyroxine (SYNTHROID) tablet 100 mcg AM ES   • metoprolol SR (TOPROL XL) tablet 25 mg DAILY       Orders Placed This Encounter   Procedures   • Diet Order Regular     Standing Status:   Standing     Number of Occurrences:   1     Order Specific Question:   Diet:     Answer:   Regular [1]     Order Specific Question:   Texture/Fiber modifications:     Answer:   Dysphagia 2(Pureed/Chopped)specify fluid consistency(question 6) [2]     Order Specific Question:   Consistency/Fluid modifications:     Answer:   Thin Liquids [3]       Assessment:  Active Hospital Problems    Diagnosis   • *Stroke (cerebrum) (HCC)   • Paroxysmal A-fib (HCC)   • Hypertension   • Leukocytosis   • Normocytic anemia   • Hypothyroidism   • Acute kidney injury (HCC)       Medical Decision Making and Plan:  CVA - Patient with left frontal CVA.  Patient started on ASA and Statin  -Continue ASA 81 mg  -PT and OT for mobility and ADLs  -SLP for cognition, speech and swallow. On dysphagia 2 diet, advanced to thin liquids    HTN/A fib - Patient on metoprolol. Previously on Losartan 100 mg daily. Per cardiology no need for loop recorder  -Continue Metoprolol 25 mg daily. Per Hospitalist restart Losartan 25 mg daily  -Consider AC 2 weeks post-stroke     Smoking - Patient with history of smoking. Will need counselling.  -Continue Nicotine patch     Hypothyroidism - Patient previously on 100 mcg.     DVT Ppx  - Patient transferred on 5000 U q12H    Total time:  35 minutes.  I spent greater than 50% of the time for patient care, counseling, and coordination on this date, including unit/floor time, and face-to-face time with the patient as per interval events and assessment and plan above. Topics discussed included discharge planning, diet advancement and urinary urgency. Patient was discussed separately in IDT today; please see details above.    Melquiades Abbasi M.D.

## 2018-08-20 NOTE — REHAB-SLP IDT TEAM NOTE
Speech Therapy   Cognitive Linquistic Functions  Comprehension Initial:  5 - Stand-by Prompting/Supervision or Set-up  Comprehension Current:  5 - Stand-by Prompting/Supervision or Set-up   Comprehension Description:  Verbal cues  Expression Initial:  4 - Minimal Assistance  Expression Current:  4 - Minimal Assistance   Expression Description:  Verbal cueing  Social Interaction Initial:  7 - Independent  Social Interaction Current:  7 - Independent   Social Interaction Description:  Increased time  Problem Solving Initial:  4 - Minimal Assistance  Problem Solving Current:  4 - Minimal Assistance   Problem Solving Description:  Verbal cueing, Therapy schedule, Increased time  Memory Initial:  4 - Minimal Assistance  Memory Current:  4 - Minimal Assistance   Memory Description:  Therapy schedule, Verbal cueing  Executive Functioning / Organization Initial:     Executive Functioning / Organization Current:   TBD   Executive Functioning Desciption: verbal cues.  Swallowing  Swallowing Status:  Patient has tolerated dysphagia 2, nectar thick,  and thin liquid trials for 2/2 sessions. Patient currently receives meals in therapeutic dining.  She is performing compensatory swallow strategies for chin tuck independently but needs min to mod cues for use of small sip size and double swallow.  Recommended advancement to thin liquids.  Dysphagia 3 textures have not yet been trialed.  Will initiate trials of dysphagia 3.  Orders Placed This Encounter   Procedures   • Diet Order Regular     Standing Status:   Standing     Number of Occurrences:   1     Order Specific Question:   Diet:     Answer:   Regular [1]     Order Specific Question:   Texture/Fiber modifications:     Answer:   Dysphagia 2(Pureed/Chopped)specify fluid consistency(question 6) [2]     Order Specific Question:   Consistency/Fluid modifications:     Answer:   Thin Liquids [3]     Behavior Modification Plan  Keep instructions simple/concrete, Give clear feedback,  Set clear goals and Analyze tasks (break down into smaller steps)  Assistive Technology  Low tech: Calendar, planner, schedule, alarms/timers, pill organizer, post-it notes, lists  Family Training/Education:  To be scheduled.  DC Recommendations:   Anticipate patient will benefit from additional ST services upon discharge from this facility.  Strengths:  Able to follow instructions, Alert and oriented, Making steady progress towards goals, Motivated for self care and independence, Pleasant and cooperative, Supportive family and Willingly participates in therapeutic activities  Barriers:  Other: slow speed of processing, some anxiety when under time pressure.  # of short term goals set=  4  # of short term goals met= 0       Speech Therapy Problems           Problem: Comprehension STGs     Dates: Start: 18       Goal: STG-Within one week, patient will     Dates: Start: 18       Description: 1) Individualized goal:  Administer RCBA with goals as appropriate.  2) Interventions:  SLP Speech Language Treatment       Note:     Goal Note filed on 18 103 by Irasema Fitzpatrick MS,CCC-SLP    Goal: STG-Within one week, patient will  Outcome: NOT MET  In progress, but not yet complete.  RCBA initiated:  Subtest 1   word-visual: 100%, Subtest 2 word auditory: 100%.  Subtest 3   word-semantic: 90%. Subtest 4 functional readin%.   Subtest 5   synonyms 60%. Subtest 6 sentence-picture: 90%.  Subtest 7 paragraph-   picture: 40%.  Remainder of test, to be completed.                  Problem: Expression STGs     Dates: Start: 18       Goal: STG-Within one week, patient will     Dates: Start: 18       Description: 1) Individualized goal:  Describe sequences with 80% word finding and use of compensatory word finding strategies with min cues.  2) Interventions:  SLP Speech Language Treatment and SLP Group Treatment       Note:     Goal Note filed on 18 103 by Irasema Fitzpatrick MS,CCC-SLP    Goal:  STG-Within one week, patient will  Outcome: NOT MET  Not yet addressed.                  Problem: Speech/Swallowing LTGs     Dates: Start: 08/16/18       Goal: LTG-By discharge, patient will safely swallow     Dates: Start: 08/16/18       Description: 1) Individualized goal:  Regular diet textures and thin liquids with no s/sx of aspiration for 2/2 meals.  2) Interventions:  SLP Swallowing Dysfunction Treatment, SLP Video Swallow Evaluation and SLP Group Treatment             Goal: LTG-By discharge, patient will express     Dates: Start: 08/16/18       Description: 1) Individualized goal:  Wants and needs, opinions for health care and self care with 90% acc for word finding in conversation.  2) Interventions:  SLP Speech Language Treatment, SLP Cognitive Skill Development and SLP Group Treatment               Problem: Swallowing STGs     Dates: Start: 08/16/18       Goal: STG-Within one week, patient will safely swallow     Dates: Start: 08/16/18       Description: 1) Individualized goal:  Therapeutic trials of thin liquids ( in isolation) and dysphagia 3 diet textures with no s/sx of aspiration for 2/2 meals.  2) Interventions:  SLP Swallowing Dysfunction Treatment, SLP Video Swallow Evaluation and SLP Group Treatment       Note:     Goal Note filed on 08/20/18 1039 by Irasema Fitzpatrick MS,CCC-SLP    Goal: STG-Within one week, patient will safely swallow  Outcome: NOT MET  Partially met.  Patient has tolerated trials of thin liquid for 2 trials   and is ready to advance to thin liquids.  Dysphagia 3 trials not yet   completed.                Goal: STG-Within one week, patient will     Dates: Start: 08/16/18       Description: 1) Individualized goal:  Participate in MBSS with goals as appropriate.  2) Interventions:  SLP Swallowing Dysfunction Treatment and SLP Video Swallow Evaluation       Note:     Goal Note filed on 08/20/18 1039 by Irasema Fitzpatrick MS,CCC-SLP    Goal: STG-Within one week, patient will  Outcome:  NOT MET  MBSS is currently unavailable due to technical difficulties.  Will   schedule when equipement is available.                       Section completed by:  Irasema Fitzpatrick MS,CCC-SLP

## 2018-08-20 NOTE — CARE PLAN
Problem: Communication  Goal: The ability to communicate needs accurately and effectively will improve  Pt with mild aphasia but able to verbalize her basic needs. She denies pain or discomfort tonight. Sleeps good. Will continue to monitor.    Problem: Safety  Goal: Will remain free from injury  Patient uses call light  appropriately this shift.  Waits for assistance when needed and does not attempt self transfer.  Able to verbalize needs.  Will continue to monitor.

## 2018-08-20 NOTE — REHAB-COLLABORATIVE ONGOING IDT TEAM NOTE
Weekly Interdisciplinary Team Conference Note    Weekly Interdisciplinary Team Conference # 1  Date:  8/20/2018    Clinicians present and reporting at team conference include the following:   MD: Melquiades Abbasi MD   RN:  Nita Calderon, HIMANSHU   PT:   Karuna Marrero, PT, DPT  OT:  Lilliana Orr, MS, OTR/L   ST:  Irasema Fitzpatrick, MS, CCC-SLP  CM:  Janny Crook RN  REC:  None  RT:  None  RPh:  Daniel Kumar [facility]  Other:   None  All reporting clinicians have a working knowledge of this patient's plan of care.    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     Priority: High   • Leukocytosis 08/12/2018   • Normocytic anemia 08/12/2018   • Hypertension 08/12/2018   • Hypothyroidism 08/12/2018   • Acute kidney injury (HCC) 08/12/2018     Results     Procedure Component Value Ref Range Date/Time    BASIC METABOLIC PANEL [[id number]] Collected:  08/20/18 0528    Order Status:  Completed Lab Status:  Final result Updated:  08/20/18 0723    Specimen:  Blood      Sodium 140 135 - 145 mmol/L      Potassium 3.7 3.6 - 5.5 mmol/L      Chloride 106 96 - 112 mmol/L      Co2 26 20 - 33 mmol/L      Glucose 90 65 - 99 mg/dL      Bun 11 8 - 22 mg/dL      Creatinine 1.08 0.50 - 1.40 mg/dL      Calcium 9.5 8.5 - 10.5 mg/dL      Anion Gap 8.0 0.0 - 11.9     MAGNESIUM [[id number]] Collected:  08/20/18 0528    Order Status:  Completed Lab Status:  Final result Updated:  08/20/18 0723    Specimen:  Blood      Magnesium 1.6 1.5 - 2.5 mg/dL     PHOSPHORUS [[id number]] Collected:  08/20/18 0528    Order Status:  Completed Lab Status:  Final result Updated:  08/20/18 0723    Specimen:  Blood      Phosphorus 3.1 2.5 - 4.5 mg/dL     ESTIMATED GFR [[id number]]  (Abnormal) Collected:  08/20/18 0528    Order Status:  Completed Lab Status:  Final result Updated:  08/20/18 0723     GFR If  59  (A) >60 mL/min/1.73 m 2      GFR If Non African American 49 (A) >60 mL/min/1.73 m 2     URINE MICROSCOPIC (W/UA) [[id number]]  (Abnormal) Collected:  08/20/18 0027    Order Status:  Completed Lab Status:  Final result Updated:  08/20/18 0715     WBC 10-20 (A) /hpf      Comment: Female  <12 Yr 0-2  >12 Yr 0-5  Male   None          RBC 0-2 /hpf      Comment: Female  >12 Yr 0-2  Male   None          Bacteria Negative None /hpf      Epithelial Cells Few /hpf      Hyaline Cast 0-2 /lpf     Narrative:       Collected By:[id number] PADMINI DEJESUS    URINALYSIS [[id number]]  (Abnormal) Collected:  08/20/18 0027    Order Status:  Completed Lab Status:  Final result Updated:  08/20/18 0648    Specimen:  Urine from Urine, Clean Catch      Color Yellow     Character Clear     Specific Gravity 1.010 <1.035      Ph 5.5 5.0 - 8.0      Glucose Negative Negative mg/dL      Ketones Negative Negative mg/dL      Protein Negative Negative mg/dL      Bilirubin Negative Negative      Urobilinogen, Urine 0.2 Negative      Nitrite Negative Negative      Leukocyte Esterase Small (A) Negative      Occult Blood Negative Negative      Micro Urine Req Microscopic    Narrative:       Collected By:[id number] PADMINI DEJESUS    CBC WITH DIFFERENTIAL [[id number]]  (Abnormal) Collected:  08/20/18 0528    Order Status:  Completed Lab Status:  Final result Updated:  08/20/18 0634    Specimen:  Blood      WBC 11.1 (H) 4.8 - 10.8 K/uL      RBC 3.77 (L) 4.20 - 5.40 M/uL      Hemoglobin 11.4 (L) 12.0 - 16.0 g/dL      Hematocrit 34.6 (L) 37.0 - 47.0 %      MCV 91.8 81.4 - 97.8 fL      MCH 30.2 27.0 - 33.0 pg      MCHC 32.9 (L) 33.6 - 35.0 g/dL      RDW 50.5 (H) 35.9 - 50.0 fL      Platelet Count 228 164 - 446 K/uL      MPV 11.2 9.0 - 12.9 fL      Neutrophils-Polys 48.80 44.00 - 72.00 %      Lymphocytes 34.20 22.00 - 41.00 %      Monocytes 5.60 0.00 - 13.40 %      Eosinophils 10.60 (H) 0.00 - 6.90 %      Basophils 0.40 0.00 - 1.80 %      Immature Granulocytes 0.40 0.00  - 0.90 %      Nucleated RBC 0.00 /100 WBC      Neutrophils (Absolute) 5.42 2.00 - 7.15 K/uL      Comment: Includes immature neutrophils, if present.        Lymphs (Absolute) 3.79 1.00 - 4.80 K/uL      Monos (Absolute) 0.62 0.00 - 0.85 K/uL      Eos (Absolute) 1.18 (H) 0.00 - 0.51 K/uL      Baso (Absolute) 0.04 0.00 - 0.12 K/uL      Immature Granulocytes (abs) 0.04 0.00 - 0.11 K/uL      NRBC (Absolute) 0.00 K/uL            Nursing  Diet/Nutrition:  Regular, Dysphagia II and Nectar Thick Liquids  Medication Administration:  Float Whole with Puree  % consumed at meals in last 24 hours:  Consumed 25-75% of meals per documentation.  Meal/Snack Consumption for the past 24 hrs:   Oral Nutrition   08/19/18 1900 Dinner;Between 50-75% Consumed   08/19/18 1236 Lunch;Between 25-50% Consumed   08/19/18 0900 Breakfast;Between % Consumed       Snack schedule:  None  Appetite:  Good  Fluid Intake/Output in past 24 hours: In: 1220 [P.O.:1220]  Out: -   Admit Weight:  Weight: 61 kg (134 lb 8 oz)  Weight Last 7 Days   [61 kg (134 lb 8 oz)] 61 kg (134 lb 8 oz) (08/15 1343)    Pain Issues:  Denies      Bowel:    Bowel Assist Initial Score:  5 - Standby Prompting/Supervision or Set-up  Bowel Assist Current Score:  5 - Standby Prompting/Supervision or Set-up  Bowl Accidents in last 7 days:     Last bowel movement: 08/17/18  Stool Description: Medium, Soft     Usual bowel pattern:  every 2-3 days  Scheduled bowel medications:  senna-docusate (PERICOLACE or SENOKOT S)   PRN bowel medications used in last 24 hours:  polyethylene glycol/lytes (MIRALAX)   Nursing Interventions:  Increased time, Scheduled medication, Supervision, Verbal cueing  Effectiveness of bowel program:   fair=sometimes needs prn bowel meds for constipation  Bladder:    Bladder Assist Initial Score:  5 - Standby Prompting/Supervision or Set-up  Bladder Assist Current Score:  5 - Standby Prompting/Supervision or Set-up  Bladder Accidents in last 7 days:     PVR range  for past 24-48 hours: -- ()  Intermittent Catheterization:  0  Medications affecting bladder:  None    Time void schedule/voiding pattern:  Time void every 3 hours during the day and every 4 hours at night  Interventions:  Increased time, Supervision, Verbal cueing  Effectiveness of bladder training:  Good=regular, predictable, emptying of bladder, patient initiates time voiding    Wound: none     Sleep/wake cycle:   Average hours slept:  Sleeps 4-6 hours without waking  Sleep medication usage:  Other trazodone    Patient/Family Training/Education:  Aspiration Precautions, Diet/Nutrition, Fall Prevention, General Self Care, Medication Management, Safe Transfers, Safety and Skin Care  Discharge Supply Recommendations:  Blood Pressure Monitor  Strengths: Willingly participates in therapeutic activities, Able to follow instructions, Supportive family, Pleasant and cooperative and Motivated for self care and independence   Barriers:   Aphasia expressive, Aspiration risk, Constipation, Fatigue and Generalized weakness            Nursing Problems           Problem: Bowel/Gastric:     Goal: Normal bowel function is maintained or improved           Goal: Will not experience complications related to bowel motility             Problem: Communication     Goal: The ability to communicate needs accurately and effectively will improve             Problem: Discharge Barriers/Planning     Goal: Patient's continuum of care needs will be met             Problem: Infection     Goal: Will remain free from infection             Problem: Knowledge Deficit     Goal: Knowledge of disease process/condition, treatment plan, diagnostic tests, and medications will improve           Goal: Knowledge of the prescribed therapeutic regimen will improve             Problem: Safety     Goal: Will remain free from injury           Goal: Will remain free from falls             Problem: Skin Integrity     Goal: Risk for impaired skin integrity will  decrease             Problem: Venous Thromboembolism (VTW)/Deep Vein Thrombosis (DVT) Prevention:     Goal: Patient will participate in Venous Thrombosis (VTE)/Deep Vein Thrombosis (DVT)Prevention Measures                  Long Term Goals:   At discharge patient will be able to function safely at home and in the community with support.    Section completed by:  Italia Barboza R.N.              Mobility  Bed mobility:  SBA  Bed /Chair/Wheelchair Transfer Initial:  4 - Minimal Assistance  Bed /Chair/Wheelchair Transfer Current:  5 - Standby Prompting/Supervision or Set-up   Bed/Chair/Wheelchair Transfer Description:  Adaptive equipment, Increased time, Supervision for safety, Verbal cueing, Set-up of equipment (Bed mobility and SPT with SBA)  Walk Initial:  4 - Minimal Assistance  Walk Current:  4 - Minimal Assistance   Walk Description:  Verbal cueing, Requires incidental assist, Extra time, Safety concerns (400' x 1 and 200' x 2 no AD, gait belt with min/steadying assist)  Wheelchair Initial:  2 - Max Assistance  Wheelchair Current:  2 - Max Assistance   Wheelchair Description:   ([place] for steering, 120' with BUEs)  Stairs Initial:  2 - Max Assistance  Stairs Current: 2 - Max Assistance   Stairs Description:  (CGA 10 stairs with BHRs)  Patient/Family Training/Education:  Patient educated on safety with mobility, POC/ rehab expectations.  DME/DC Recommendations: TBD, no DME anticipated at this time/ owns 4WW  Strengths:  Willingly participates in therapeutic activities and Other: Senior living/ no stairs, PLOF  Barriers:   Aphasia expressive, Decreased endurance, Poor activity tolerance, Poor balance and Other: Dec motor control   # of short term goals set= 3  # of short term goals met= 1       Physical Therapy Problems           Problem: Mobility     Dates: Start: 08/16/18       Goal: STG-Within one week, patient will ambulate household distance     Dates: Start: 08/16/18       Description: 1) Individualized goal:   Without AD with SPV without LOB  2) Interventions: PT Group Therapy, PT Gait Training, PT Therapeutic Exercises, PT Neuro Re-Ed/Balance, PT Therapeutic Activity and PT Evaluation         Note:     Goal Note filed on 08/19/18 1326 by Karuna Marrero DPT    Goal: STG-Within one week, patient will ambulate household distance  Outcome: NOT MET  CGA/min A                Goal: STG-Within one week, patient will ascend and descend four to six stairs     Dates: Start: 08/16/18       Description: 1) Individualized goal:  With single HR with SBA and occ cues for sequencing/safety as needed  2) Interventions:PT Group Therapy, PT Gait Training, PT Therapeutic Exercises, PT Neuro Re-Ed/Balance, PT Therapeutic Activity and PT Evaluation           Note:     Goal Note filed on 08/19/18 1326 by Karuna Marrero DPT    Goal: STG-Within one week, patient will ascend and descend four to six   stairs  10 stairs BHR CGA                  Problem: Mobility Transfers     Dates: Start: 08/16/18       Goal: STG-Within one week, patient will transfer bed to chair     Dates: Start: 08/16/18       Description: 1) Individualized goal:  independently  2) Interventions: PT Group Therapy, PT Gait Training, PT Therapeutic Exercises, PT Neuro Re-Ed/Balance, PT Therapeutic Activity and PT Evaluation         Note:     Goal Note filed on 08/19/18 1326 by Karuna Marrero DPT    Goal: STG-Within one week, patient will transfer bed to chair  Outcome: NOT MET  CGA/SBA                  Problem: PT-Long Term Goals     Dates: Start: 08/16/18       Goal: LTG-By discharge, patient will tolerate standing     Dates: Start: 08/16/18       Description: 1) Individualized goal:  5 min unsupported without signs/symptoms of hypoxia or LOB  2) Interventions: PT Group Therapy, PT Gait Training, PT Therapeutic Exercises, PT Neuro Re-Ed/Balance, PT Therapeutic Activity and PT Evaluation                 Goal: LTG-By discharge, patient will ambulate     Dates: Start: 08/16/18        Description: 1) Individualized goal:  500' without AD on indoor and outdoor surfaces Flash.  2) Interventions:  PT Group Therapy, PT Gait Training, PT Therapeutic Exercises, PT Neuro Re-Ed/Balance, PT Therapeutic Activity and PT Evaluation                 Goal: LTG-By discharge, patient will ambulate up/down 4-6 stairs     Dates: Start: 08/16/18       Description: 1) Individualized goal:  With single HR SPV  2) Interventions:  PT Group Therapy, PT Gait Training, PT Therapeutic Exercises, PT Neuro Re-Ed/Balance, PT Therapeutic Activity and PT Evaluation                 Goal: LTG-By discharge, patient will transfer in/out of a car     Dates: Start: 08/16/18       Description: 1) Individualized goal:  Flash  2) Interventions:  PT Group Therapy, PT Gait Training, PT Therapeutic Exercises, PT Neuro Re-Ed/Balance, PT Therapeutic Activity and PT Evaluation                         Section completed by:  Karuna Marrero PT, DPT       Activities of Daily Living  Eating Initial:  1 - Total Assistance  Eating Current:  5 - Standby Prompting/Supervision or Set-up   Eating Description:  Increased time, Modified diet, Supervision for safety, Verbal cueing  Grooming Initial:  5 - Standby Prompting/Supervision or Set-up  Grooming Current:  5 - Standby Prompting/Supervision or Set-up   Grooming Description:  Supervision for safety, Verbal cueing  Bathing Initial:  4 - Minimal Assistance  Bathing Current:  5 - Standby Prompting/Supervision or Set-up   Bathing Description:  Grab bar, Hand held shower, Supervision for safety, Verbal cueing, Set-up of equipment (pt needed cues to sit for LB washing and not to stand on 1 foot w/ GB d/t fall risk, pt receptive and comlpinat with request.)  Upper Body Dressing Initial:  5 - Standby Prompting/Supervision or Set-up  Upper Body Dressing Current:  6 - Modified Independent   Upper Body Dressing Description:     Lower Body Dressing Initial:  4 - Minimal Assistance  Lower Body Dressing Current:  4  - Minimal Assistance Pt LOB posterior needs CGA in standing   Lower Body Dressing Description:  4 - Minimal Assistance  Toileting Initial:  4 - Minimal Assistance  Toileting Current:  4 - Minimal Assistance   Toileting Description:  Supervision for safety, Verbal cueing, Grab bar  Toilet Transfer Initial:  4 - Minimal Assistance  Toilet Transfer Current:  4 - Minimal Assistance   Toilet Transfer Description:  4 - Minimal Assistance  Tub / Shower Transfer Initial:  4 - Minimal Assistance  Tub / Shower Transfer Current:  5 - Standby Prompting/Supervision or Set-up   Tub / Shower Transfer Description:  Supervision for safety, Verbal cueing, Grab bar  IADL:  Completed coffee and laundry with SPV and v/c for tasks   Family Training/Education:  TBD   DME/DC Recommendations:  GB shower and toilet, CGA for transfers and standing LB tasks    Strengths:  Able to follow instructions, Adequate strength, Alert and oriented, Good carryover of learning, Good endurance, Good insight into deficits/needs, Independent PLOF, Making steady progress towards goals, Manages pain appropriately, Motivated for self care and independence, Pleasant and cooperative, Supportive family and Willingly participates in therapeutic activities  Barriers:  Aphasia expressive, Decreased endurance, Generalized weakness and Poor balance     # of short term goals set= 3    # of short term goals met=0 (3 in progress)         Occupational Therapy Goals           Problem: Dressing     Dates: Start: 08/16/18       Goal: STG-Within one week, patient will dress LB     Dates: Start: 08/16/18       Description: 1) Individualized Goal:  With SPV, DME and AE prn  2) Interventions: OT E Stim Attended, OT Group Therapy, OT Self Care/ADL, OT Cognitive Skill Dev, OT Community Reintegration, OT Manual Ther Technique, OT Neuro Re-Ed/Balance, OT Sensory Int Techniques, OT Therapeutic Activity, OT Evaluation and OT Therapeutic Exercise.               Problem: Functional  Transfers     Dates: Start: 08/16/18       Goal: STG-Within one week, patient will transfer to toilet     Dates: Start: 08/16/18       Description: 1) Individualized Goal:  With SPV, DME and AE prn  2) Interventions: OT E Stim Attended, OT Group Therapy, OT Self Care/ADL, OT Cognitive Skill Dev, OT Community Reintegration, OT Manual Ther Technique, OT Neuro Re-Ed/Balance, OT Sensory Int Techniques, OT Therapeutic Activity, OT Evaluation and OT Therapeutic Exercise.             Problem: OT Long Term Goals     Dates: Start: 08/16/18       Goal: LTG-By discharge, patient will complete basic self care tasks     Dates: Start: 08/16/18       Description: 1) Individualized Goal:  With Mod I, DME and AE prn  2) Interventions: OT E Stim Attended, OT Group Therapy, OT Self Care/ADL, OT Cognitive Skill Dev, OT Community Reintegration, OT Manual Ther Technique, OT Neuro Re-Ed/Balance, OT Sensory Int Techniques, OT Therapeutic Activity, OT Evaluation and OT Therapeutic Exercise.               Goal: LTG-By discharge, patient will perform bathroom transfers     Dates: Start: 08/16/18       Description: 1) Individualized Goal:  With SPV, DME and AE prn  2) Interventions: OT E Stim Attended, OT Group Therapy, OT Self Care/ADL, OT Cognitive Skill Dev, OT Community Reintegration, OT Manual Ther Technique, OT Neuro Re-Ed/Balance, OT Sensory Int Techniques, OT Therapeutic Activity, OT Evaluation and OT Therapeutic Exercise.                 Problem: Toileting     Dates: Start: 08/16/18       Goal: STG-Within one week, patient will complete toileting tasks     Dates: Start: 08/16/18       Description: 1) Individualized Goal:  With SPV, DME and AE prn  2) Interventions: OT E Stim Attended, OT Group Therapy, OT Self Care/ADL, OT Cognitive Skill Dev, OT Community Reintegration, OT Manual Ther Technique, OT Neuro Re-Ed/Balance, OT Sensory Int Techniques, OT Therapeutic Activity, OT Evaluation and OT Therapeutic Exercise.                      Section completed by:  Lilliana Orr OT       Cognitive Linquistic Functions  Comprehension Initial:  5 - Stand-by Prompting/Supervision or Set-up  Comprehension Current:  5 - Stand-by Prompting/Supervision or Set-up   Comprehension Description:  Verbal cues  Expression Initial:  4 - Minimal Assistance  Expression Current:  4 - Minimal Assistance   Expression Description:  Verbal cueing  Social Interaction Initial:  7 - Independent  Social Interaction Current:  7 - Independent   Social Interaction Description:  Increased time  Problem Solving Initial:  4 - Minimal Assistance  Problem Solving Current:  4 - Minimal Assistance   Problem Solving Description:  Verbal cueing, Therapy schedule, Increased time  Memory Initial:  4 - Minimal Assistance  Memory Current:  4 - Minimal Assistance   Memory Description:  Therapy schedule, Verbal cueing  Executive Functioning / Organization Initial:     Executive Functioning / Organization Current:   TBD   Executive Functioning Desciption: verbal cues.  Swallowing  Swallowing Status:  Patient has tolerated dysphagia 2, nectar thick,  and thin liquid trials for 2/2 sessions. Patient currently receives meals in therapeutic dining.  She is performing compensatory swallow strategies for chin tuck independently but needs min to mod cues for use of small sip size and double swallow.  Recommended advancement to thin liquids.  Dysphagia 3 textures have not yet been trialed.  Will initiate trials of dysphagia 3.  Orders Placed This Encounter   Procedures   • Diet Order Regular     Standing Status:   Standing     Number of Occurrences:   1     Order Specific Question:   Diet:     Answer:   Regular [1]     Order Specific Question:   Texture/Fiber modifications:     Answer:   Dysphagia 2(Pureed/Chopped)specify fluid consistency(question 6) [2]     Order Specific Question:   Consistency/Fluid modifications:     Answer:   Thin Liquids [3]     Behavior Modification Plan  Keep  instructions simple/concrete, Give clear feedback, Set clear goals and Analyze tasks (break down into smaller steps)  Assistive Technology  Low tech: Calendar, planner, schedule, alarms/timers, pill organizer, post-it notes, lists  Family Training/Education:  To be scheduled.  DC Recommendations:   Anticipate patient will benefit from additional ST services upon discharge from this facility.  Strengths:  Able to follow instructions, Alert and oriented, Making steady progress towards goals, Motivated for self care and independence, Pleasant and cooperative, Supportive family and Willingly participates in therapeutic activities  Barriers:  Other: slow speed of processing, some anxiety when under time pressure.  # of short term goals set=  4  # of short term goals met= 0       Speech Therapy Problems           Problem: Comprehension STGs     Dates: Start: 18       Goal: STG-Within one week, patient will     Dates: Start: 18       Description: 1) Individualized goal:  Administer RCBA with goals as appropriate.  2) Interventions:  SLP Speech Language Treatment       Note:     Goal Note filed on 18 1039 by Irasema Fitzpatrick MS,CCC-SLP    Goal: STG-Within one week, patient will  Outcome: NOT MET  In progress, but not yet complete.  RCBA initiated:  Subtest 1   word-visual: 100%, Subtest 2 word auditory: 100%.  Subtest 3   word-semantic: 90%. Subtest 4 functional readin%.   Subtest 5   synonyms 60%. Subtest 6 sentence-picture: 90%.  Subtest 7 paragraph-   picture: 40%.  Remainder of test, to be completed.                  Problem: Expression STGs     Dates: Start: 18       Goal: STG-Within one week, patient will     Dates: Start: 18       Description: 1) Individualized goal:  Describe sequences with 80% word finding and use of compensatory word finding strategies with min cues.  2) Interventions:  SLP Speech Language Treatment and SLP Group Treatment       Note:     Goal Note filed on  08/20/18 1039 by Irasema Fitzpatrick MS,CCC-SLP    Goal: STG-Within one week, patient will  Outcome: NOT MET  Not yet addressed.                  Problem: Speech/Swallowing LTGs     Dates: Start: 08/16/18       Goal: LTG-By discharge, patient will safely swallow     Dates: Start: 08/16/18       Description: 1) Individualized goal:  Regular diet textures and thin liquids with no s/sx of aspiration for 2/2 meals.  2) Interventions:  SLP Swallowing Dysfunction Treatment, SLP Video Swallow Evaluation and SLP Group Treatment             Goal: LTG-By discharge, patient will express     Dates: Start: 08/16/18       Description: 1) Individualized goal:  Wants and needs, opinions for health care and self care with 90% acc for word finding in conversation.  2) Interventions:  SLP Speech Language Treatment, SLP Cognitive Skill Development and SLP Group Treatment               Problem: Swallowing STGs     Dates: Start: 08/16/18       Goal: STG-Within one week, patient will safely swallow     Dates: Start: 08/16/18       Description: 1) Individualized goal:  Therapeutic trials of thin liquids ( in isolation) and dysphagia 3 diet textures with no s/sx of aspiration for 2/2 meals.  2) Interventions:  SLP Swallowing Dysfunction Treatment, SLP Video Swallow Evaluation and SLP Group Treatment       Note:     Goal Note filed on 08/20/18 1039 by Irasema Fitzpatrick MS,CCC-SLP    Goal: STG-Within one week, patient will safely swallow  Outcome: NOT MET  Partially met.  Patient has tolerated trials of thin liquid for 2 trials   and is ready to advance to thin liquids.  Dysphagia 3 trials not yet   completed.                Goal: STG-Within one week, patient will     Dates: Start: 08/16/18       Description: 1) Individualized goal:  Participate in MBSS with goals as appropriate.  2) Interventions:  SLP Swallowing Dysfunction Treatment and SLP Video Swallow Evaluation       Note:     Goal Note filed on 08/20/18 1039 by Irasema Fitzpatrick MS,CCC-SLP     Goal: STG-Within one week, patient will  Outcome: NOT MET  MBSS is currently unavailable due to technical difficulties.  Will   schedule when equipement is available.                       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     Goal: Other Goal     Description: Monitor/Evaluation: Monitor PO intake, weight, labs, medication adjustments, skin integrity, GI function, vitals, I/Os, and overall hydration status.  Adjust nutritional POC pending clinical outcomes.   RD following weekly.    Goal: Maintain >/= 50% adequate oral nutrient/fluid intake to promote nutrition optimization/healing.              Nutrition services: Day 0 of admit.  Johana Weston is a 78 y.o. 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     Consult received for supplements.  Pt seen today in her room today to introduce this writer and discuss current appetite. Pt able to make her needs known although struggles to form responses. With time and encouragement pt was able to provide the following information. She notes that her appetite is not at her baseline and quantifies her appetite as 'less than 5 out of 10'. She indicates it has been this was for awhile. She confirms wt loss based on the fact that her clothing has been fitting more loosely. She is interested in adding Boost Plus Vanilla 2x daily to encourage better PO intake. She normally drinks a few Boost per week at home. She notes a stable UBW of 135# which is consistent with [facility] admit wt on 8/11 of 135# (61.3kg) by bed scale. Current wt is stable at 134.5# (61kg). Pt notes that she was NPO 'a lot' during prior admission. Per review of chart pt was NPO off and on  "throughout her acute stay. PO intake was 50-75% of all meal records, however. Pt noted to have FEES completed today prior to transfer and was found appropriate for a D2 / NTL diet d/t concern of aspiration of NTL/thins. Diet and menu explained to the pt and she verbalized understanding. The pt was encouraged to continue good PO intake as she is able and to communicate with nutrition services if she would like to amend her preferences moving forward. The pt verbalized that she would.     Assessment:  Height: 162.6 cm (5' 4\")  Weight: 61 kg (134 lb 8 oz)  Body mass index is 23.09 kg/m² - WNL  Diet/Intake: Regular / D2 / NTL, 1:1 supervision by nursing. No PO records on file yet.     Evaluation:   1. Labs: None new today  2. Meds: Lipitor, Heparin, Pericolace, (PRN: Hydralazine, Zofran, Miralax, MOM, Dulcolax, Tramadol)  3. Fluids: None currently in MAR  4. Skin: Intact - no skin breakdown noted  5. GI/: last BM 8/15 (small, soft), UOP yellow per chart  6. I/O's: None recorded yet today     Diagnosis: Swallowing difficulty r/t left frontal lobe subacute infarct as evidenced by FEES examination and ST recommendation for dysphagia diet with thickened liquids.     Intervention/ Recommendations/POC:  1. Continue current diet. Boost Plus Vanilla with breakfast and for HS snack daily to be more like home meal routine.  2.Encourage adequate PO/fluid intake.  3. Nutrition rep to see regarding food prefs/ honor within dietary restrictions (if indicated)     Monitor/Evaluation: Monitor PO intake, weight, labs, medication adjustments, skin integrity, GI function, vitals, I/Os, and overall hydration status.  Adjust nutritional POC pending clinical outcomes.    RD following weekly.     Goal: Maintain >/= 50% adequate oral nutrient/fluid intake to promote nutrition optimization/healing.     Section completed by:  Bonita Huitron R.D.    REHAB-Pharmacy IDT Team Note by Daniel Kumar [facility] at 8/17/2018  2:19 PM  Version 1 of 1    " Author:  Daniel Kumar RPH Service:  (none) Author Type:  Pharmacist    Filed:  8/17/2018  2:21 PM Date of Service:  8/17/2018  2:19 PM Status:  Signed    :  Daniel Kumar RPH (Pharmacist)         Pharmacy   Pharmacy  Antibiotics/Antifungals/Antivirals:  Medication:      Active Orders     Ordered     Ordering Provider       Fri Aug 17, 2018 10:28 AM    08/17/18 1028  levoFLOXacin (LEVAQUIN) tablet 500 mg  DAILY      Yohannes Wolff M.D.    08/17/18 1028  metroNIDAZOLE (FLAGYL) tablet 500 mg  EVERY 8 HOURS      Yohannes Wolff M.D.        Route:         PO, PO  Stop Date:  Both 8/22/18  Reason: Leukocytosis, WBC's: 9.5 (8/14) --> 11.5 (8/16), Denies cough, Has been afebrile, CXR (8/16): U/A: pending, Martínez start Levaquin and Flagyl (8/17), Note: the speech therapist at [facility] saw her and noted she may be aspirating thin liquids     Antihypertensives/Cardiac:  Medication:    Orders (72h ago through future)    Start     Ordered    08/16/18 1545  losartan (COZAAR) tablet 25 mg  Q DAY      08/16/18 1525    08/16/18 0900  metoprolol SR (TOPROL XL) tablet 25 mg  DAILY      08/15/18 1749    08/16/18 0600  metoprolol SR (TOPROL XL) tablet 25 mg  Q DAY,   Status:  Discontinued      08/15/18 1349    08/15/18 2100  atorvastatin (LIPITOR) tablet 40 mg  EVERY EVENING      08/15/18 1349    08/15/18 1349  hydrALAZINE (APRESOLINE) tablet 25 mg  EVERY 8 HOURS PRN      08/15/18 1349        Patient Vitals for the past 24 hrs:   BP Pulse   08/17/18 0620 137/74 (!) 56   08/17/18 0511 104/65 -   08/16/18 1854 128/67 60   08/16/18 1736 132/80 -   08/16/18 1500 115/74 60       Anticoagulation:  Medication: Aspirin, Heparin    Other key medications: A review of the medication list reveals no issues at this time. Patient is currently on antihypertensive(s). Recommend home blood pressure monitoring/recording if antihypertensive(s) regimen(s) continue.    Section completed by: Daniel Kumar [facility][[email or web address]]     Attribution Key     AW.1 -  Daniel Kumar [facility] on 8/17/2018  2:19 PM                    DC Planning  DC destination/dispostion:  To return to [place] independent senior living apartment with no stairs where she lives alone; has support from 3 adult children also living in [place].    Referrals: Possible home health services    DC Needs: DME-TBD, patient has 4WW, SPC; appointment with PCP in [place], Carlos Enrique Amin MD    Barriers to discharge:  Lives alone, cognitive deficits, expressive aphasia    Strengths: Supportive family, independent PLOF    Section completed by:  Sumaya Saldana CM MSW      Physician Summary  Melquiades Abbasi MD participated and led team conference discussion.

## 2018-08-20 NOTE — CARE PLAN
Problem: Dressing  Goal: STG-Within one week, patient will dress LB  1) Individualized Goal:  With SPV, DME and AE prn  2) Interventions: OT E Stim Attended, OT Group Therapy, OT Self Care/ADL, OT Cognitive Skill Dev, OT Community Reintegration, OT Manual Ther Technique, OT Neuro Re-Ed/Balance, OT Sensory Int Techniques, OT Therapeutic Activity, OT Evaluation and OT Therapeutic Exercise.     Outcome: NOT MET      Problem: Toileting  Goal: STG-Within one week, patient will complete toileting tasks  1) Individualized Goal:  With SPV, DME and AE prn  2) Interventions: OT E Stim Attended, OT Group Therapy, OT Self Care/ADL, OT Cognitive Skill Dev, OT Community Reintegration, OT Manual Ther Technique, OT Neuro Re-Ed/Balance, OT Sensory Int Techniques, OT Therapeutic Activity, OT Evaluation and OT Therapeutic Exercise.       Outcome: NOT MET      Problem: Functional Transfers  Goal: STG-Within one week, patient will transfer to toilet  1) Individualized Goal:  With SPV, DME and AE prn  2) Interventions: OT E Stim Attended, OT Group Therapy, OT Self Care/ADL, OT Cognitive Skill Dev, OT Community Reintegration, OT Manual Ther Technique, OT Neuro Re-Ed/Balance, OT Sensory Int Techniques, OT Therapeutic Activity, OT Evaluation and OT Therapeutic Exercise.   Outcome: NOT MET

## 2018-08-20 NOTE — REHAB-NURSING IDT TEAM NOTE
Nursing   Nursing  Diet/Nutrition:  Regular, Dysphagia II and Nectar Thick Liquids  Medication Administration:  Float Whole with Puree  % consumed at meals in last 24 hours:  Consumed 25-75% of meals per documentation.  Meal/Snack Consumption for the past 24 hrs:   Oral Nutrition   08/19/18 1900 Dinner;Between 50-75% Consumed   08/19/18 1236 Lunch;Between 25-50% Consumed   08/19/18 0900 Breakfast;Between % Consumed       Snack schedule:  None  Appetite:  Good  Fluid Intake/Output in past 24 hours: In: 1220 [P.O.:1220]  Out: -   Admit Weight:  Weight: 61 kg (134 lb 8 oz)  Weight Last 7 Days   [61 kg (134 lb 8 oz)] 61 kg (134 lb 8 oz) (08/15 1343)    Pain Issues:  Denies      Bowel:    Bowel Assist Initial Score:  5 - Standby Prompting/Supervision or Set-up  Bowel Assist Current Score:  5 - Standby Prompting/Supervision or Set-up  Bowl Accidents in last 7 days:     Last bowel movement: 08/17/18  Stool Description: Medium, Soft     Usual bowel pattern:  every 2-3 days  Scheduled bowel medications:  senna-docusate (PERICOLACE or SENOKOT S)   PRN bowel medications used in last 24 hours:  polyethylene glycol/lytes (MIRALAX)   Nursing Interventions:  Increased time, Scheduled medication, Supervision, Verbal cueing  Effectiveness of bowel program:   fair=sometimes needs prn bowel meds for constipation  Bladder:    Bladder Assist Initial Score:  5 - Standby Prompting/Supervision or Set-up  Bladder Assist Current Score:  5 - Standby Prompting/Supervision or Set-up  Bladder Accidents in last 7 days:     PVR range for past 24-48 hours: -- ()  Intermittent Catheterization:  0  Medications affecting bladder:  None    Time void schedule/voiding pattern:  Time void every 3 hours during the day and every 4 hours at night  Interventions:  Increased time, Supervision, Verbal cueing  Effectiveness of bladder training:  Good=regular, predictable, emptying of bladder, patient initiates time voiding    Wound: none     Sleep/wake  cycle:   Average hours slept:  Sleeps 4-6 hours without waking  Sleep medication usage:  Other trazodone    Patient/Family Training/Education:  Aspiration Precautions, Diet/Nutrition, Fall Prevention, General Self Care, Medication Management, Safe Transfers, Safety and Skin Care  Discharge Supply Recommendations:  Blood Pressure Monitor  Strengths: Willingly participates in therapeutic activities, Able to follow instructions, Supportive family, Pleasant and cooperative and Motivated for self care and independence   Barriers:   Aphasia expressive, Aspiration risk, Constipation, Fatigue and Generalized weakness            Nursing Problems           Problem: Bowel/Gastric:     Goal: Normal bowel function is maintained or improved           Goal: Will not experience complications related to bowel motility             Problem: Communication     Goal: The ability to communicate needs accurately and effectively will improve             Problem: Discharge Barriers/Planning     Goal: Patient's continuum of care needs will be met             Problem: Infection     Goal: Will remain free from infection             Problem: Knowledge Deficit     Goal: Knowledge of disease process/condition, treatment plan, diagnostic tests, and medications will improve           Goal: Knowledge of the prescribed therapeutic regimen will improve             Problem: Safety     Goal: Will remain free from injury           Goal: Will remain free from falls             Problem: Skin Integrity     Goal: Risk for impaired skin integrity will decrease             Problem: Venous Thromboembolism (VTW)/Deep Vein Thrombosis (DVT) Prevention:     Goal: Patient will participate in Venous Thrombosis (VTE)/Deep Vein Thrombosis (DVT)Prevention Measures                  Long Term Goals:   At discharge patient will be able to function safely at home and in the community with support.    Section completed by:  Italia Barboza R.N.

## 2018-08-21 NOTE — REHAB-DIETARY IDT TEAM NOTE
Dietary   Nutrition       Dietary Problems           Problem: Other Problem (see comments)     Description: Diagnosis: Swallowing difficulty r/t left frontal lobe subacute infarct as evidenced by FEES examination and ST recommendation for dysphagia diet with thickened liquids.     Goal: Other Goal (Resolved)     Description: Monitor/Evaluation: Monitor PO intake, weight, labs, medication adjustments, skin integrity, GI function, vitals, I/Os, and overall hydration status.  Adjust nutritional POC pending clinical outcomes.   RD following weekly.    Goal: Maintain >/= 50% adequate oral nutrient/fluid intake to promote nutrition optimization/healing.              PO 58% of meals in past 72 hours.  Patient liking Boost Plus as she drinks them at home.  Had MBSS today. Trialing Dysphagia 3 textures and happy about it.  Ate 90% of b'fast.   Labs reviewed- WBC elevated- still on Flagyl through today for possible aspiration PNA.  Medications reviewed and noted  Skin intact.  No new wt.  GI BM today.   WNL.  Vitals- some bradycardia noted , /78   +1026mL x 24 hours- no output recorded     Expect PO to improve with diet upgrades.  Interventions in place, clinical picture reviewed.  RD following PRN per protocol.      Section completed by:  Emi Salvador R.D.

## 2018-08-21 NOTE — PROGRESS NOTES
"Rehab Progress Note     Encounter Date: 8/21/2018    CC: Aphasia  Dysarthria     Interval Events (Subjective)  Patient sitting up in wheelchair at bedside. Discussed the findings of IDT yesterday and plan for discharge next Monday. Discussed that her daughter is interested in a Life alert.  Patient is in agreement. Patient denies pain at this time. Discussed MBSS today and may be able to advance diet. Discussed risks and benefits.     IDT Team Meeting 8/20/2018  DC/Disposition:  8/27/18       Objective:  VITAL SIGNS: /73   Pulse (!) 57   Temp 36.6 °C (97.9 °F)   Resp 18   Ht 1.626 m (5' 4\")   Wt 61 kg (134 lb 8 oz)   SpO2 90%   Breastfeeding? No   BMI 23.09 kg/m²   Gen: NAD  Psych: Mood and affect appropriate  CV: RRR, no edema  Resp: CTAB, no upper airway sounds  Abd: NTND  Neuro: AOx3, anomic aphasia, longer sentences of 4-5 words  Unchanged from 8/20/18    Recent Results (from the past 72 hour(s))   URINALYSIS    Collection Time: 08/20/18 12:27 AM   Result Value Ref Range    Color Yellow     Character Clear     Specific Gravity 1.010 <1.035    Ph 5.5 5.0 - 8.0    Glucose Negative Negative mg/dL    Ketones Negative Negative mg/dL    Protein Negative Negative mg/dL    Bilirubin Negative Negative    Urobilinogen, Urine 0.2 Negative    Nitrite Negative Negative    Leukocyte Esterase Small (A) Negative    Occult Blood Negative Negative    Micro Urine Req Microscopic    URINE MICROSCOPIC (W/UA)    Collection Time: 08/20/18 12:27 AM   Result Value Ref Range    WBC 10-20 (A) /hpf    RBC 0-2 /hpf    Bacteria Negative None /hpf    Epithelial Cells Few /hpf    Hyaline Cast 0-2 /lpf   BASIC METABOLIC PANEL    Collection Time: 08/20/18  5:28 AM   Result Value Ref Range    Sodium 140 135 - 145 mmol/L    Potassium 3.7 3.6 - 5.5 mmol/L    Chloride 106 96 - 112 mmol/L    Co2 26 20 - 33 mmol/L    Glucose 90 65 - 99 mg/dL    Bun 11 8 - 22 mg/dL    Creatinine 1.08 0.50 - 1.40 mg/dL    Calcium 9.5 8.5 - 10.5 mg/dL    " Anion Gap 8.0 0.0 - 11.9   CBC WITH DIFFERENTIAL    Collection Time: 08/20/18  5:28 AM   Result Value Ref Range    WBC 11.1 (H) 4.8 - 10.8 K/uL    RBC 3.77 (L) 4.20 - 5.40 M/uL    Hemoglobin 11.4 (L) 12.0 - 16.0 g/dL    Hematocrit 34.6 (L) 37.0 - 47.0 %    MCV 91.8 81.4 - 97.8 fL    MCH 30.2 27.0 - 33.0 pg    MCHC 32.9 (L) 33.6 - 35.0 g/dL    RDW 50.5 (H) 35.9 - 50.0 fL    Platelet Count 228 164 - 446 K/uL    MPV 11.2 9.0 - 12.9 fL    Neutrophils-Polys 48.80 44.00 - 72.00 %    Lymphocytes 34.20 22.00 - 41.00 %    Monocytes 5.60 0.00 - 13.40 %    Eosinophils 10.60 (H) 0.00 - 6.90 %    Basophils 0.40 0.00 - 1.80 %    Immature Granulocytes 0.40 0.00 - 0.90 %    Nucleated RBC 0.00 /100 WBC    Neutrophils (Absolute) 5.42 2.00 - 7.15 K/uL    Lymphs (Absolute) 3.79 1.00 - 4.80 K/uL    Monos (Absolute) 0.62 0.00 - 0.85 K/uL    Eos (Absolute) 1.18 (H) 0.00 - 0.51 K/uL    Baso (Absolute) 0.04 0.00 - 0.12 K/uL    Immature Granulocytes (abs) 0.04 0.00 - 0.11 K/uL    NRBC (Absolute) 0.00 K/uL   MAGNESIUM    Collection Time: 08/20/18  5:28 AM   Result Value Ref Range    Magnesium 1.6 1.5 - 2.5 mg/dL   PHOSPHORUS    Collection Time: 08/20/18  5:28 AM   Result Value Ref Range    Phosphorus 3.1 2.5 - 4.5 mg/dL   ESTIMATED GFR    Collection Time: 08/20/18  5:28 AM   Result Value Ref Range    GFR If  59 (A) >60 mL/min/1.73 m 2    GFR If Non African American 49 (A) >60 mL/min/1.73 m 2       Current Facility-Administered Medications   Medication Frequency   • metroNIDAZOLE (FLAGYL) tablet 500 mg Q8HRS   • losartan (COZAAR) tablet 25 mg DAILY   • Respiratory Care per Protocol Continuous RT   • Pharmacy Consult Request ...Pain Management Review 1 Each PRN   • tramadol (ULTRAM) 50 MG tablet 50 mg Q4HRS PRN   • acetaminophen (TYLENOL) tablet 650 mg Q4HRS PRN   • senna-docusate (PERICOLACE or SENOKOT S) 8.6-50 MG per tablet 2 Tab BID    And   • polyethylene glycol/lytes (MIRALAX) PACKET 1 Packet QDAY PRN    And   • magnesium  hydroxide (MILK OF MAGNESIA) suspension 30 mL QDAY PRN    And   • bisacodyl (DULCOLAX) suppository 10 mg QDAY PRN   • artificial tears 1.4 % ophthalmic solution 1 Drop PRN   • benzocaine-menthol (CEPACOL) lozenge 1 Lozenge Q2HRS PRN   • hydrALAZINE (APRESOLINE) tablet 25 mg Q8HRS PRN   • mag hydrox-al hydrox-simeth (MAALOX PLUS ES or MYLANTA DS) suspension 20 mL Q2HRS PRN   • ondansetron (ZOFRAN ODT) dispertab 4 mg 4X/DAY PRN    Or   • ondansetron (ZOFRAN) syringe/vial injection 4 mg 4X/DAY PRN   • traZODone (DESYREL) tablet 50 mg QHS PRN   • sodium chloride (OCEAN) 0.65 % nasal spray 2 Spray PRN   • atorvastatin (LIPITOR) tablet 40 mg Q EVENING   • aspirin (ASA) chewable tab 81 mg DAILY   • heparin injection 5,000 Units Q12HRS   • nicotine (NICODERM) 7 MG/24HR 7 mg Daily-0600   • levothyroxine (SYNTHROID) tablet 100 mcg AM ES   • metoprolol SR (TOPROL XL) tablet 25 mg DAILY       Orders Placed This Encounter   Procedures   • Diet Order Regular     Standing Status:   Standing     Number of Occurrences:   1     Order Specific Question:   Diet:     Answer:   Regular [1]     Order Specific Question:   Texture/Fiber modifications:     Answer:   Dysphagia 2(Pureed/Chopped)specify fluid consistency(question 6) [2]     Order Specific Question:   Consistency/Fluid modifications:     Answer:   Thin Liquids [3]       Assessment:  Active Hospital Problems    Diagnosis   • *Stroke (cerebrum) (HCC)   • Paroxysmal A-fib (HCC)   • Hypertension   • Leukocytosis   • Normocytic anemia   • Hypothyroidism   • Acute kidney injury (HCC)       Medical Decision Making and Plan:  CVA - Patient with left frontal CVA.  Patient started on ASA and Statin  -Continue ASA 81 mg  -PT and OT for mobility and ADLs  -SLP for cognition, speech and swallow. On dysphagia 2 diet, advanced to thin liquids. MBSS on 8/21/18 - pending results    HTN/A fib - Patient on metoprolol. Previously on Losartan 100 mg daily. Per cardiology no need for loop  recorder  -Continue Metoprolol 25 mg daily. Per Hospitalist restart Losartan 25 mg daily  -Consider AC 2 weeks post-stroke     Leukocytosis - Patient started on Levaquin and Flagyl for possible aspiration. Will have MBSS on 8/21/18. Complete Abx on 8/22/18    Smoking - Patient with history of smoking. Will need counselling.  -Continue Nicotine patch     Hypothyroidism - Patient previously on 100 mcg.     DVT Ppx - Patient transferred on 5000 U q12H    Total time:  25 minutes.  I spent greater than 50% of the time for patient care, counseling, and coordination on this date, including unit/floor time, and face-to-face time with the patient as per interval events and assessment and plan above. Topics discussed included MBSS, aspiration pneumonia and possible advancement of diet.    Melquiades Abbasi M.D.

## 2018-08-21 NOTE — CARE PLAN
Problem: Other Problem (see comments)  Goal: Other Goal  Monitor/Evaluation: Monitor PO intake, weight, labs, medication adjustments, skin integrity, GI function, vitals, I/Os, and overall hydration status.  Adjust nutritional POC pending clinical outcomes.   RD following weekly.    Goal: Maintain >/= 50% adequate oral nutrient/fluid intake to promote nutrition optimization/healing.    Outcome: MET Date Met: 08/21/18  PO 58% of meals in past 72 hours.  Patient liking Boost Plus as she drinks them at home.  Had MBSS today. Trialing Dysphagia 3 textures and happy about it.  Ate 90% of b'fast.   Labs reviewed- WBC elevated- still on Flagyl through today for possible aspiration PNA.  Medications reviewed and noted  Skin intact.  No new wt.  GI BM today.   WNL.  Vitals- some bradycardia noted , /78   +1026mL x 24 hours- no output recorded     Expect PO to improve with diet upgrades.  Interventions in place, clinical picture reviewed.  RD following PRN per protocol.

## 2018-08-21 NOTE — CARE PLAN
Problem: Safety  Goal: Will remain free from injury  Outcome: PROGRESSING AS EXPECTED  Patient demonstrates good safety technique this shift.  Asks for assistance when needed and does not attempt self transfer.  Able to verbalize needs.      Problem: Skin Integrity  Goal: Risk for impaired skin integrity will decrease  Outcome: PROGRESSING AS EXPECTED  Patient is able to reposition self and pillows used to off set pressure points.

## 2018-08-21 NOTE — CARE PLAN
Problem: Venous Thromboembolism (VTW)/Deep Vein Thrombosis (DVT) Prevention:  Goal: Patient will participate in Venous Thrombosis (VTE)/Deep Vein Thrombosis (DVT)Prevention Measures    Intervention: Assess and monitor for anticoagulation complications  Pt receiving heparin injection q 12hrs for DVT prevention. Will continue to monitor.      Problem: Bowel/Gastric:  Goal: Normal bowel function is maintained or improved  Pt continent of bowel. GERMAINE 8/21/18

## 2018-08-22 NOTE — DISCHARGE PLANNING
Case Management  Called patient's daughter, Arpita she is agreeable to Home health ,choice complete, referral in process. Also agreeable to 4ww for patient, the one she has is older and the brakes do not work. Continue to follow

## 2018-08-22 NOTE — PROGRESS NOTES
"Rehab Progress Note     Encounter Date: 8/22/2018    CC: Aphasia  Dysarthria     Interval Events (Subjective)  Patient sitting up at bedside.  Discussed results of MBSS including advancing to Dypshagia 3 w thin liquids, may be her baseline. Otherwise discussed with patient and therapy team, home health referral and will need new 4WW as hers is broken per family.  Patient otherwise denies SOB or cough.  Discussed with patient about her smoking including risks of prolonged use.     IDT Team Meeting 8/20/2018  DC/Disposition:  8/27/18       Objective:  VITAL SIGNS: /67   Pulse 60   Temp 36.4 °C (97.6 °F)   Resp 18   Ht 1.626 m (5' 4\")   Wt 61 kg (134 lb 8 oz)   SpO2 95%   Breastfeeding? No   BMI 23.09 kg/m²   Gen: NAD  Psych: Mood and affect appropriate  CV: RRR, no edema  Resp: CTAB, no upper airway sounds  Abd: NTND  Neuro: Anomic aphasia, given longer pauses patient speaks more. Follows commands 75%    Recent Results (from the past 72 hour(s))   URINALYSIS    Collection Time: 08/20/18 12:27 AM   Result Value Ref Range    Color Yellow     Character Clear     Specific Gravity 1.010 <1.035    Ph 5.5 5.0 - 8.0    Glucose Negative Negative mg/dL    Ketones Negative Negative mg/dL    Protein Negative Negative mg/dL    Bilirubin Negative Negative    Urobilinogen, Urine 0.2 Negative    Nitrite Negative Negative    Leukocyte Esterase Small (A) Negative    Occult Blood Negative Negative    Micro Urine Req Microscopic    URINE MICROSCOPIC (W/UA)    Collection Time: 08/20/18 12:27 AM   Result Value Ref Range    WBC 10-20 (A) /hpf    RBC 0-2 /hpf    Bacteria Negative None /hpf    Epithelial Cells Few /hpf    Hyaline Cast 0-2 /lpf   BASIC METABOLIC PANEL    Collection Time: 08/20/18  5:28 AM   Result Value Ref Range    Sodium 140 135 - 145 mmol/L    Potassium 3.7 3.6 - 5.5 mmol/L    Chloride 106 96 - 112 mmol/L    Co2 26 20 - 33 mmol/L    Glucose 90 65 - 99 mg/dL    Bun 11 8 - 22 mg/dL    Creatinine 1.08 0.50 - 1.40 " mg/dL    Calcium 9.5 8.5 - 10.5 mg/dL    Anion Gap 8.0 0.0 - 11.9   CBC WITH DIFFERENTIAL    Collection Time: 08/20/18  5:28 AM   Result Value Ref Range    WBC 11.1 (H) 4.8 - 10.8 K/uL    RBC 3.77 (L) 4.20 - 5.40 M/uL    Hemoglobin 11.4 (L) 12.0 - 16.0 g/dL    Hematocrit 34.6 (L) 37.0 - 47.0 %    MCV 91.8 81.4 - 97.8 fL    MCH 30.2 27.0 - 33.0 pg    MCHC 32.9 (L) 33.6 - 35.0 g/dL    RDW 50.5 (H) 35.9 - 50.0 fL    Platelet Count 228 164 - 446 K/uL    MPV 11.2 9.0 - 12.9 fL    Neutrophils-Polys 48.80 44.00 - 72.00 %    Lymphocytes 34.20 22.00 - 41.00 %    Monocytes 5.60 0.00 - 13.40 %    Eosinophils 10.60 (H) 0.00 - 6.90 %    Basophils 0.40 0.00 - 1.80 %    Immature Granulocytes 0.40 0.00 - 0.90 %    Nucleated RBC 0.00 /100 WBC    Neutrophils (Absolute) 5.42 2.00 - 7.15 K/uL    Lymphs (Absolute) 3.79 1.00 - 4.80 K/uL    Monos (Absolute) 0.62 0.00 - 0.85 K/uL    Eos (Absolute) 1.18 (H) 0.00 - 0.51 K/uL    Baso (Absolute) 0.04 0.00 - 0.12 K/uL    Immature Granulocytes (abs) 0.04 0.00 - 0.11 K/uL    NRBC (Absolute) 0.00 K/uL   MAGNESIUM    Collection Time: 08/20/18  5:28 AM   Result Value Ref Range    Magnesium 1.6 1.5 - 2.5 mg/dL   PHOSPHORUS    Collection Time: 08/20/18  5:28 AM   Result Value Ref Range    Phosphorus 3.1 2.5 - 4.5 mg/dL   ESTIMATED GFR    Collection Time: 08/20/18  5:28 AM   Result Value Ref Range    GFR If  59 (A) >60 mL/min/1.73 m 2    GFR If Non African American 49 (A) >60 mL/min/1.73 m 2       Current Facility-Administered Medications   Medication Frequency   • magnesium oxide (MAG-OX) tablet 400 mg DAILY   • fluconazole (DIFLUCAN) tablet 100 mg DAILY   • losartan (COZAAR) tablet 25 mg DAILY   • Respiratory Care per Protocol Continuous RT   • Pharmacy Consult Request ...Pain Management Review 1 Each PRN   • tramadol (ULTRAM) 50 MG tablet 50 mg Q4HRS PRN   • acetaminophen (TYLENOL) tablet 650 mg Q4HRS PRN   • senna-docusate (PERICOLACE or SENOKOT S) 8.6-50 MG per tablet 2 Tab BID     And   • polyethylene glycol/lytes (MIRALAX) PACKET 1 Packet QDAY PRN    And   • magnesium hydroxide (MILK OF MAGNESIA) suspension 30 mL QDAY PRN    And   • bisacodyl (DULCOLAX) suppository 10 mg QDAY PRN   • artificial tears 1.4 % ophthalmic solution 1 Drop PRN   • benzocaine-menthol (CEPACOL) lozenge 1 Lozenge Q2HRS PRN   • hydrALAZINE (APRESOLINE) tablet 25 mg Q8HRS PRN   • mag hydrox-al hydrox-simeth (MAALOX PLUS ES or MYLANTA DS) suspension 20 mL Q2HRS PRN   • ondansetron (ZOFRAN ODT) dispertab 4 mg 4X/DAY PRN    Or   • ondansetron (ZOFRAN) syringe/vial injection 4 mg 4X/DAY PRN   • traZODone (DESYREL) tablet 50 mg QHS PRN   • sodium chloride (OCEAN) 0.65 % nasal spray 2 Spray PRN   • atorvastatin (LIPITOR) tablet 40 mg Q EVENING   • aspirin (ASA) chewable tab 81 mg DAILY   • heparin injection 5,000 Units Q12HRS   • nicotine (NICODERM) 7 MG/24HR 7 mg Daily-0600   • levothyroxine (SYNTHROID) tablet 100 mcg AM ES   • metoprolol SR (TOPROL XL) tablet 25 mg DAILY       Orders Placed This Encounter   Procedures   • Diet Order Regular     Standing Status:   Standing     Number of Occurrences:   1     Order Specific Question:   Diet:     Answer:   Regular [1]     Order Specific Question:   Texture/Fiber modifications:     Answer:   Dysphagia 3(Mechanical Soft)specify fluid consistency(question 6) [3]     Order Specific Question:   Consistency/Fluid modifications:     Answer:   Thin Liquids [3]       Assessment:  Active Hospital Problems    Diagnosis   • *Stroke (cerebrum) (HCC)   • Paroxysmal A-fib (HCC)   • Hypertension   • Leukocytosis   • Normocytic anemia   • Hypothyroidism   • Acute kidney injury (HCC)       Medical Decision Making and Plan:  CVA - Patient with left frontal CVA.  Patient started on ASA and Statin  -Continue ASA 81 mg  -PT and OT for mobility and ADLs  -SLP for cognition, speech and swallow. On dysphagia 2 diet, advanced to thin liquids. MBSS on 8/21/18 - patient's diet advanced to Dysphagia 3  diet.     HTN/A fib - Patient on metoprolol. Previously on Losartan 100 mg daily. Per cardiology no need for loop recorder  -Continue Metoprolol 25 mg daily. Per Hospitalist restart Losartan 25 mg daily  -Consider AC 2 weeks post-stroke     Leukocytosis - Patient started on Levaquin and Flagyl for possible aspiration.  Completed Abx on 8/22/18.  Patient started on Fluconazole per Hospital for 5 days.      Smoking - Patient with history of smoking. Counseling for cessation performed 8/22/18  -Continue Nicotine patch    Hypothyroidism - Patient previously on 100 mcg.     DVT Ppx - Patient transferred on 5000 U q12H    Total time:  30 minutes.  I spent greater than 50% of the time for patient care, counseling, and coordination on this date, including unit/floor time, and face-to-face time with the patient as per interval events and assessment and plan above. Topics discussed included MBSS and advancement of diet, ongoing leukocytosis treatment and smoking.     Melquiades Abbasi M.D.     I had a 6 minute discussion with patient on 8/22/2018 about smoking cessation.  Discussed that smoking is associated with respiratory, cardiovascular, oncologic, and neurologic illnesses.  Patient was provided advice and counseling on different methods of smoking cessation as well as provided supportive listening for psychologic aspect of addiction.

## 2018-08-22 NOTE — PROGRESS NOTES
Hospital Medicine Progress Note, Adult, Complex               Author: Nascimento Jeanette Date & Time created: 2018  11:12 PM     Interval History:  CC - HTN, leukocytosis    Pt dysarthric but otherwise appropriate.    Review of Systems:  Review of Systems   Constitutional: Negative for chills and fever.   HENT: Negative.    Eyes: Negative.    Respiratory: Negative for cough and shortness of breath.    Cardiovascular: Negative for chest pain and palpitations.   Gastrointestinal: Negative for abdominal pain, nausea and vomiting.   Musculoskeletal: Negative.    Neurological: Positive for speech change.       Physical Exam:  Physical Exam   Constitutional: No distress.   HENT:   Head: Normocephalic and atraumatic.   Right Ear: External ear normal.   Left Ear: External ear normal.   Eyes: Conjunctivae and EOM are normal. Right eye exhibits no discharge. Left eye exhibits no discharge.   Neck: Normal range of motion. Neck supple. No tracheal deviation present.   Cardiovascular:   irreg irreg   Pulmonary/Chest: No stridor. No respiratory distress. She has no wheezes.   Decreased BS   Abdominal: Soft. Bowel sounds are normal. She exhibits no distension. There is no tenderness.   Musculoskeletal:   Trace edema   Neurological: She is alert.   dysarthria   Skin: Skin is warm and dry. She is not diaphoretic.   Vitals reviewed.      Labs:        Invalid input(s): [id number]      Recent Labs      18   0528   SODIUM  140   POTASSIUM  3.7   CHLORIDE  106   CO2  26   BUN  11   CREATININE  1.08   MAGNESIUM  1.6   PHOSPHORUS  3.1   CALCIUM  9.5     Recent Labs      18   0528   GLUCOSE  90     Recent Labs      18   0528   RBC  3.77*   HEMOGLOBIN  11.4*   HEMATOCRIT  34.6*   PLATELETCT  228     Recent Labs      18   0528   WBC  11.1*   NEUTSPOLYS  48.80   LYMPHOCYTES  34.20   MONOCYTES  5.60   EOSINOPHILS  10.60*   BASOPHILS  0.40           Hemodynamics:  Temp (24hrs), Av.8 °C (98.3 °F), Min:36.6 °C (97.9 °F),  Max:37.1 °C (98.7 °F)  Temperature: 37.1 °C (98.7 °F)  Pulse  Av.7  Min: 56  Max: 67   Blood Pressure : 136/64     Respiratory:    Respiration: 18, Pulse Oximetry: 92 %        RML Breath Sounds: Clear, RLL Breath Sounds: Clear, LLL Breath Sounds: Clear  Fluids:    Intake/Output Summary (Last 24 hours) at 18 2312  Last data filed at 18 1923   Gross per 24 hour   Intake              740 ml   Output                0 ml   Net              740 ml        GI/Nutrition:  Orders Placed This Encounter   Procedures   • Diet Order Regular     Standing Status:   Standing     Number of Occurrences:   1     Order Specific Question:   Diet:     Answer:   Regular [1]     Order Specific Question:   Texture/Fiber modifications:     Answer:   Dysphagia 2(Pureed/Chopped)specify fluid consistency(question 6) [2]     Order Specific Question:   Consistency/Fluid modifications:     Answer:   Thin Liquids [3]         Medical Decision Making, by Problem:  S/P LEFT FRONTAL CVA - ASA/statin    AFIB - rate controlled on B-Bl, holding anticoagulation until 2 wks post stroke    HTN - monitor BP trends on Losartan and Metoprolol    PYURIA - UA +WBC but negative bacteria, will start empiric Fluconazole pending UCx    LEUKOCYTOSIS - WBC not improved w/ empiric antibiotics, therefore start antifungal as above    ANEMIA - follow H/H    BORDERLINE HYPOMAGNESEMIA - start MgOx    HYPOTHYROIDISM - cont Synthroid w/ outpt F/U           Quality-Core Measures   Reviewed items::  Labs reviewed and Medications reviewed  DVT prophylaxis pharmacological::  Heparin  Assessed for rehabilitation services:  Patient was assess for and/or received rehabilitation services during this hospitalization

## 2018-08-22 NOTE — CARE PLAN
Problem: Safety  Goal: Will remain free from falls  Outcome: PROGRESSING AS EXPECTED  Patient uses call light consistently and appropriately this shift.  Waits for assistance when needed and does not attempt self transfer.  Able to verbalize needs.      Problem: Other Problem (see comments)  Intervention: Other Intervention  Patient tolerating diet well Dys 3 with thin liquids, takes pill whole in apple sauce.

## 2018-08-22 NOTE — CARE PLAN
Problem: Safety  Goal: Will remain free from injury  Patient educated on the importance of using call light for assistance, patient verbalized understanding. Patient uses call light appropriately, does not attempt to self transfer. Call light and belongings within reach, bed in lowest and locked position, bed rails up x3 for safety, and non skid socks on. Hourly rounding in place.    Problem: Bowel/Gastric:  Goal: Normal bowel function is maintained or improved  Last BM 8/21/18, denies s/s of constipation. Patient refused stool softeners tonight. Bowel sounds normoactive in all 4 quadrants, abdomen soft and non-tender, no distention noted.

## 2018-08-22 NOTE — DISCHARGE PLANNING
DME referral sent to [facility].  Home health referral sent to [facility] per choice form.  Awaiting responses.

## 2018-08-23 NOTE — CARE PLAN
Problem: Swallowing STGs  Goal: STG-Within one week, patient will safely swallow  1) Individualized goal:  Therapeutic trials of thin liquids ( in isolation) and dysphagia 3 diet textures with no s/sx of aspiration for 2/2 meals.  2) Interventions:  SLP Swallowing Dysfunction Treatment, SLP Video Swallow Evaluation and SLP Group Treatment     Outcome: MET Date Met: 08/23/18  Patient is safely tolerating dysphagia 3 and thin liquid textures    Problem: Comprehension STGs  Goal: STG-Within one week, patient will  1) Individualized goal:  Administer RCBA with goals as appropriate.  2) Interventions:  SLP Speech Language Treatment     Outcome: MET Date Met: 08/23/18  See doc flow sheet on 8/23/18 for full details.

## 2018-08-23 NOTE — PROGRESS NOTES
Hospital Medicine Progress Note, Adult, Complex               Author: Pily Reid Date & Time created: 2018  6:17 PM     Interval History:  CC - HTN, leukocytosis    Pt seen and examined in -[facility].  Denies new complaints.    Review of Systems:  Review of Systems   Constitutional: Negative for chills and fever.   HENT: Negative.    Eyes: Negative.    Respiratory: Negative for cough and shortness of breath.    Cardiovascular: Negative for chest pain and palpitations.   Gastrointestinal: Negative for abdominal pain, nausea and vomiting.   Musculoskeletal: Negative.    Neurological: Positive for speech change.       Physical Exam:  Physical Exam   Constitutional: No distress.   HENT:   Head: Normocephalic and atraumatic.   Right Ear: External ear normal.   Left Ear: External ear normal.   Eyes: Conjunctivae and EOM are normal. Right eye exhibits no discharge. Left eye exhibits no discharge.   Neck: Normal range of motion. Neck supple. No tracheal deviation present.   Cardiovascular:   irreg irreg   Pulmonary/Chest: No stridor. No respiratory distress. She has no wheezes.   Decreased BS   Abdominal: Soft. Bowel sounds are normal. She exhibits no distension. There is no tenderness.   Musculoskeletal:   Trace edema   Neurological: She is alert.   dysarthria   Skin: Skin is warm and dry. She is not diaphoretic.   Vitals reviewed.      Labs:        Invalid input(s): [id number]      Recent Labs      18   0528   SODIUM  140   POTASSIUM  3.7   CHLORIDE  106   CO2  26   BUN  11   CREATININE  1.08   MAGNESIUM  1.6   PHOSPHORUS  3.1   CALCIUM  9.5     Recent Labs      18   0528   GLUCOSE  90     Recent Labs      18   0528   RBC  3.77*   HEMOGLOBIN  11.4*   HEMATOCRIT  34.6*   PLATELETCT  228     Recent Labs      18   0528   WBC  11.1*   NEUTSPOLYS  48.80   LYMPHOCYTES  34.20   MONOCYTES  5.60   EOSINOPHILS  10.60*   BASOPHILS  0.40           Hemodynamics:  Temp (24hrs), Av.6 °C (97.9 °F), Min:36.4  °C (97.5 °F), Max:37.1 °C (98.7 °F)  Temperature: 36.4 °C (97.6 °F)  Pulse  Av.2  Min: 56  Max: 75   Blood Pressure : 123/67     Respiratory:    Respiration: 18, Pulse Oximetry: 95 %        RML Breath Sounds: Clear, RLL Breath Sounds: Clear, LLL Breath Sounds: Clear  Fluids:    Intake/Output Summary (Last 24 hours) at 18 1817  Last data filed at 18 1321   Gross per 24 hour   Intake              820 ml   Output                0 ml   Net              820 ml        GI/Nutrition:  Orders Placed This Encounter   Procedures   • Diet Order Regular     Standing Status:   Standing     Number of Occurrences:   1     Order Specific Question:   Diet:     Answer:   Regular [1]     Order Specific Question:   Texture/Fiber modifications:     Answer:   Dysphagia 3(Mechanical Soft)specify fluid consistency(question 6) [3]     Order Specific Question:   Consistency/Fluid modifications:     Answer:   Thin Liquids [3]         Medical Decision Making, by Problem:  S/P LEFT FRONTAL CVA - ASA/statin    AFIB - rate controlled on B-Bl, holding anticoagulation until 2 wks post stroke    HTN - monitor BP trends on Losartan and Metoprolol    PYURIA - UA +WBC but negative bacteria, cont empiric Fluconazole pending UCx    LEUKOCYTOSIS - WBC not improved w/ empiric antibiotics, therefore started antifungal as above    ANEMIA - follow H/H    BORDERLINE HYPOMAGNESEMIA - cont MgOx    HYPOTHYROIDISM - cont Synthroid w/ outpt F/U TFT           Quality-Core Measures   Reviewed items::  Labs reviewed and Medications reviewed  DVT prophylaxis pharmacological::  Heparin  Assessed for rehabilitation services:  Patient was assess for and/or received rehabilitation services during this hospitalization

## 2018-08-23 NOTE — PROGRESS NOTES
Hospital Medicine Progress Note, Adult, Complex               Author: Pily Reid Date & Time created: 2018  10:23 AM     Interval History:  CC - HTN, leukocytosis    Pt ate most of breakfast today, no complaints.    Review of Systems:  Review of Systems   Constitutional: Negative for chills and fever.   HENT: Negative.    Eyes: Negative.    Respiratory: Negative for cough and shortness of breath.    Cardiovascular: Negative for chest pain and palpitations.   Gastrointestinal: Negative for abdominal pain, nausea and vomiting.   Musculoskeletal: Negative.    Neurological: Positive for speech change.       Physical Exam:  Physical Exam   Constitutional: No distress.   HENT:   Head: Normocephalic and atraumatic.   Right Ear: External ear normal.   Left Ear: External ear normal.   Eyes: Conjunctivae and EOM are normal. Right eye exhibits no discharge. Left eye exhibits no discharge.   Neck: Normal range of motion. Neck supple. No tracheal deviation present.   Cardiovascular:   irreg irreg   Pulmonary/Chest: No stridor. No respiratory distress. She has no wheezes.   Decreased BS   Abdominal: Soft. Bowel sounds are normal. She exhibits no distension. There is no tenderness.   Musculoskeletal:   Trace edema   Neurological: She is alert.   dysarthria   Skin: Skin is warm and dry. She is not diaphoretic.   Vitals reviewed.      Labs:        Invalid input(s): [id number]      No results for input(s): SODIUM, POTASSIUM, CHLORIDE, CO2, BUN, CREATININE, MAGNESIUM, PHOSPHORUS, CALCIUM in the last 72 hours.  No results for input(s): ALTSGPT, ASTSGOT, ALKPHOSPHAT, TBILIRUBIN, DBILIRUBIN, GAMMAGT, AMYLASE, LIPASE, ALB, PREALBUMIN, GLUCOSE in the last 72 hours.  No results for input(s): RBC, HEMOGLOBIN, HEMATOCRIT, PLATELETCT, PROTHROMBTM, APTT, INR, IRON, FERRITIN, TOTIRONBC in the last 72 hours.            Hemodynamics:  Temp (24hrs), Av.7 °C (98 °F), Min:36.4 °C (97.6 °F), Max:36.8 °C (98.3 °F)  Temperature: 36.8 °C (98.3  °F)  Pulse  Av.2  Min: 56  Max: 75   Blood Pressure : 131/64     Respiratory:    Respiration: 17, Pulse Oximetry: 91 %        RML Breath Sounds: Clear, RLL Breath Sounds: Clear, LLL Breath Sounds: Clear  Fluids:    Intake/Output Summary (Last 24 hours) at 18 1023  Last data filed at 18 0906   Gross per 24 hour   Intake              800 ml   Output                0 ml   Net              800 ml        GI/Nutrition:  Orders Placed This Encounter   Procedures   • Diet Order Regular     Standing Status:   Standing     Number of Occurrences:   1     Order Specific Question:   Diet:     Answer:   Regular [1]     Order Specific Question:   Texture/Fiber modifications:     Answer:   Dysphagia 3(Mechanical Soft)specify fluid consistency(question 6) [3]     Order Specific Question:   Consistency/Fluid modifications:     Answer:   Thin Liquids [3]         Medical Decision Making, by Problem:  S/P LEFT FRONTAL CVA - ASA/statin    AFIB - rate controlled on B-Bl, holding anticoagulation until 2 wks post stroke    HTN - monitor BP trends on Losartan and Metoprolol    PYURIA - UA +WBC but negative bacteria, cont empiric Fluconazole pending UCx    LEUKOCYTOSIS - WBC not improved w/ empiric antibiotics, therefore started antifungal as above, follow WBC    ANEMIA - follow H/H    BORDERLINE HYPOMAGNESEMIA - cont MgOx, check F/U labs    HYPOTHYROIDISM - cont Synthroid w/ outpt F/U TFT           Quality-Core Measures   Reviewed items::  Labs reviewed and Medications reviewed  DVT prophylaxis pharmacological::  Heparin  Assessed for rehabilitation services:  Patient was assess for and/or received rehabilitation services during this hospitalization

## 2018-08-23 NOTE — CARE PLAN
Problem: Bowel/Gastric:  Goal: Normal bowel function is maintained or improved  Outcome: PROGRESSING AS EXPECTED  Patient continent of bowel this shift, refuses scheduled bowel medications d/t loose stool, bowel meds now prn.

## 2018-08-23 NOTE — PROGRESS NOTES
"Rehab Progress Note     Encounter Date: 8/23/2018    CC: Aphasia  Dysarthria     Interval Events (Subjective)  Patient sitting up wheelchair. Discussed bowel medications with patient as she has been refusing. Patient declines bowel medications with headshake.  She reports loose stools when taking medications.  Otherwise she reports she is doing well.  Discussed case with hospitalist, fluconazole for no response to antibiotics with leukocytosis. Discussed continuing to check labs, patient in agreement.   Patient otherwise denies SOB, chest pain, headaches, fever, constipation, burning mictiuration or cough.     IDT Team Meeting 8/20/2018  DC/Disposition:  8/27/18     Objective:  VITAL SIGNS: /64   Pulse 61   Temp 36.8 °C (98.3 °F)   Resp 17   Ht 1.626 m (5' 4\")   Wt 61 kg (134 lb 8 oz)   SpO2 91%   Breastfeeding? No   BMI 23.09 kg/m²   Gen: NAD  Psych: Mood and affect appropriate  CV: RRR, no edema  Resp: There is mild b/l  fine crepitations mainly in the middle and lower zones  Abd: NTND  Neuro: Anomic aphasia, given longer pauses patient speaks more. Follows commands 75%    No results found for this or any previous visit (from the past 72 hour(s)).    Current Facility-Administered Medications   Medication Frequency   • magnesium oxide (MAG-OX) tablet 400 mg DAILY   • fluconazole (DIFLUCAN) tablet 100 mg DAILY   • losartan (COZAAR) tablet 25 mg DAILY   • Respiratory Care per Protocol Continuous RT   • Pharmacy Consult Request ...Pain Management Review 1 Each PRN   • tramadol (ULTRAM) 50 MG tablet 50 mg Q4HRS PRN   • acetaminophen (TYLENOL) tablet 650 mg Q4HRS PRN   • senna-docusate (PERICOLACE or SENOKOT S) 8.6-50 MG per tablet 2 Tab BID    And   • polyethylene glycol/lytes (MIRALAX) PACKET 1 Packet QDAY PRN    And   • magnesium hydroxide (MILK OF MAGNESIA) suspension 30 mL QDAY PRN    And   • bisacodyl (DULCOLAX) suppository 10 mg QDAY PRN   • artificial tears 1.4 % ophthalmic solution 1 Drop PRN   • " benzocaine-menthol (CEPACOL) lozenge 1 Lozenge Q2HRS PRN   • hydrALAZINE (APRESOLINE) tablet 25 mg Q8HRS PRN   • mag hydrox-al hydrox-simeth (MAALOX PLUS ES or MYLANTA DS) suspension 20 mL Q2HRS PRN   • ondansetron (ZOFRAN ODT) dispertab 4 mg 4X/DAY PRN    Or   • ondansetron (ZOFRAN) syringe/vial injection 4 mg 4X/DAY PRN   • traZODone (DESYREL) tablet 50 mg QHS PRN   • sodium chloride (OCEAN) 0.65 % nasal spray 2 Spray PRN   • atorvastatin (LIPITOR) tablet 40 mg Q EVENING   • aspirin (ASA) chewable tab 81 mg DAILY   • heparin injection 5,000 Units Q12HRS   • nicotine (NICODERM) 7 MG/24HR 7 mg Daily-0600   • levothyroxine (SYNTHROID) tablet 100 mcg AM ES   • metoprolol SR (TOPROL XL) tablet 25 mg DAILY       Orders Placed This Encounter   Procedures   • Diet Order Regular     Standing Status:   Standing     Number of Occurrences:   1     Order Specific Question:   Diet:     Answer:   Regular [1]     Order Specific Question:   Texture/Fiber modifications:     Answer:   Dysphagia 3(Mechanical Soft)specify fluid consistency(question 6) [3]     Order Specific Question:   Consistency/Fluid modifications:     Answer:   Thin Liquids [3]       Assessment:  Active Hospital Problems    Diagnosis   • *Stroke (cerebrum) (HCC)   • Paroxysmal A-fib (HCC)   • Hypertension   • Leukocytosis   • Normocytic anemia   • Hypothyroidism   • Acute kidney injury (HCC)       Medical Decision Making and Plan:  CVA - Patient with left frontal CVA.  Patient started on ASA and Statin  -Continue ASA 81 mg  -PT and OT for mobility and ADLs  -SLP for cognition, speech and swallow. On dysphagia 2 diet, advanced to thin liquids. MBSS on 8/21/18 - patient's diet advanced to Dysphagia 3 diet.     HTN/A fib - Patient on metoprolol. Previously on Losartan 100 mg daily. Per cardiology no need for loop recorder  -Continue Metoprolol 25 mg daily. Per Hospitalist restart Losartan 25 mg daily  -Patient at high risk for fall and inability to make needs known  if fallen, will defer discussion of AC to outpatient.      Leukocytosis - Patient started on Levaquin and Flagyl for possible aspiration.  Completed Abx on 8/22/18.  Patient started on Fluconazole per Hospital for 5 days.       Smoking - Patient with history of smoking. Counseling for cessation performed 8/22/18  -Continue Nicotine patch    Hypothyroidism - Patient previously on 100 mcg. Today she is a symptomatic. We recommend to continue on the same dose.     DVT Ppx - Patient transferred on Heparin 5000 U q12H, the patient can walk up to 300 f/d. We recommend to discontinue Heparin.    GAVI PopeS.     Date of Addendum: 8/23/2018  The patient was evaluated with the fellow staff.  I was present for and/or performed the interview as well as personally examined the patient.  I reviewed the fellow's note and agree with the fellow's findings and plan as documented in the fellow's note except as documented below. Please reference the fellow's note for complete information.     The chart was reviewed and summarized. Available labs, imaging, O2 sats, and EKGs were reviewed. Available nursing, therapy, consultant, and staff notes were reviewed. I am actively involved in the patient's care.     Brief Discussion and Plan:  Will defer discussion of AC to outpatient, patient with high fall risk and due to anomia difficult to make needs known. Will need follow-up with cardiologist and neurology  For her leukocytosis, discussed with hospitalist who recommended Fluconazole for unresponsive WBC to antibiotics. They recommend rechecking tomorrow.   Patient is ambulating > 300 feet. Will discontinue Heparin.   Bowel medications changed to PRN    Total time:  30 minutes.  I spent greater than 50% of the time for patient care, counseling, and coordination on this date, including unit/floor time, and face-to-face time with the patient as per interval events and assessment and plan above.    Melquiades Abbasi,  M.D.

## 2018-08-23 NOTE — CARE PLAN
Problem: Other Problem (see comments)  Intervention: Other Intervention  Patient free from s/s dehydration.  Oral and buccal mucosa pink and moist; conjunctiva moist; skin turgor good.  PO intake adequate.

## 2018-08-23 NOTE — CARE PLAN
Problem: Swallowing STGs  Goal: STG-Within one week, patient will  1) Individualized goal:  Participate in MBSS with goals as appropriate.  2) Interventions:  SLP Swallowing Dysfunction Treatment and SLP Video Swallow Evaluation     Outcome: MET Date Met: 08/21/18  Completed 8/21/18.  Please see summary for full details.

## 2018-08-23 NOTE — DISCHARGE PLANNING
Per Era at [facility], DME referral accepted.  Per Montserrat at [facility], referral accepted.

## 2018-08-23 NOTE — CARE PLAN
Problem: Safety  Goal: Will remain free from injury  Outcome: PROGRESSING AS EXPECTED  Pt AOx4 during shift, pt verbablizes needs and waits for assistance when needed; pts bed is in lowest position, 3 x side rails upright, bed alarm activated, personal belonging and call light within reach; will continue to monitor     Problem: Bowel/Gastric:  Goal: Normal bowel function is maintained or improved  Outcome: PROGRESSING AS EXPECTED  Pts last BM was today, 8/22/2018; pt refused scheduled bowel meds, prn bowel meds available if needed; bowel sounds heard in all 4 qaudrants, pt denies pain/discomfort; will continue to monitor

## 2018-08-24 NOTE — CARE PLAN
Problem: Safety  Goal: Will remain free from injury  Patient uses call light appropriately for assistance as needed/wanted. Bed locked, in lowest position.    Problem: Infection  Goal: Will remain free from infection  No acute signs or symptoms of infection during shift assessment.

## 2018-08-24 NOTE — PROGRESS NOTES
Hospital Medicine Progress Note, Adult, Complex               Author: Pily Jeanette Date & Time created: 2018  4:27 PM     Interval History:  CC - HTN, leukocytosis    Pt graduated from T-dine to regular dining room.  Progressing well.    Review of Systems:  Review of Systems   Constitutional: Negative for chills and fever.   HENT: Negative.    Eyes: Negative.    Respiratory: Negative for cough and shortness of breath.    Cardiovascular: Negative for chest pain and palpitations.   Gastrointestinal: Negative for abdominal pain, nausea and vomiting.   Musculoskeletal: Negative.    Neurological: Positive for speech change.       Physical Exam:  Physical Exam   Constitutional: No distress.   HENT:   Head: Normocephalic and atraumatic.   Right Ear: External ear normal.   Left Ear: External ear normal.   Eyes: Conjunctivae and EOM are normal. Right eye exhibits no discharge. Left eye exhibits no discharge.   Neck: Normal range of motion. Neck supple. No tracheal deviation present.   Cardiovascular:   irreg irreg   Pulmonary/Chest: No stridor. No respiratory distress. She has no wheezes.   Decreased BS   Abdominal: Soft. Bowel sounds are normal. She exhibits no distension. There is no tenderness.   Musculoskeletal:   Trace edema   Neurological: She is alert.   dysarthria   Skin: Skin is warm and dry. She is not diaphoretic.   Vitals reviewed.      Labs:        Invalid input(s): [id number]      Recent Labs      18   0557   SODIUM  142   POTASSIUM  4.0   CHLORIDE  110   CO2  26   BUN  18   CREATININE  1.05   MAGNESIUM  1.8   CALCIUM  9.2     Recent Labs      18   0557   GLUCOSE  87     Recent Labs      18   0557   RBC  3.32*   HEMOGLOBIN  10.1*   HEMATOCRIT  31.2*   PLATELETCT  226     Recent Labs      18   0557   WBC  8.9           Hemodynamics:  Temp (24hrs), Av.9 °C (98.4 °F), Min:36.6 °C (97.9 °F), Max:37.2 °C (98.9 °F)  Temperature: 36.9 °C (98.4 °F)  Pulse  Av.5  Min: 56  Max: 75    Blood Pressure : 124/61     Respiratory:    Respiration: 19, Pulse Oximetry: 90 %        RML Breath Sounds: Clear, RLL Breath Sounds: Clear, LLL Breath Sounds: Clear  Fluids:    Intake/Output Summary (Last 24 hours) at 08/24/18 1627  Last data filed at 08/24/18 1235   Gross per 24 hour   Intake              820 ml   Output                0 ml   Net              820 ml        GI/Nutrition:  Orders Placed This Encounter   Procedures   • Diet Order Regular     Standing Status:   Standing     Number of Occurrences:   1     Order Specific Question:   Diet:     Answer:   Regular [1]     Order Specific Question:   Texture/Fiber modifications:     Answer:   Dysphagia 3(Mechanical Soft)specify fluid consistency(question 6) [3]     Order Specific Question:   Consistency/Fluid modifications:     Answer:   Thin Liquids [3]         Medical Decision Making, by Problem:  S/P LEFT FRONTAL CVA - ASA/statin    AFIB - rate controlled on B-Bl, holding anticoagulation until 2 wks post stroke    HTN - monitor BP trends on Losartan and Metoprolol    PYURIA - UA +WBC but negative bacteria, no UCx done, WBC better w/ empiric Fluconazole, cont same x 5 days    LEUKOCYTOSIS - WBC not improved w/ empiric antibiotics, therefore started antifungal as above    ANEMIA - follow H/H    BORDERLINE HYPOMAGNESEMIA - F/U labs improved, cont MgOx    HYPOTHYROIDISM - cont Synthroid w/ outpt F/U TFT           Quality-Core Measures   Reviewed items::  Labs reviewed and Medications reviewed  Assessed for rehabilitation services:  Patient was assess for and/or received rehabilitation services during this hospitalization

## 2018-08-24 NOTE — PROGRESS NOTES
"Rehab Progress Note     Encounter Date: 8/24/2018    CC: Aphasia  Dysarthria     Interval Events (Subjective)  Patient sitting up wheelchair. Still reports anomia. Otherwise she reports she is doing well. She reports runny nose and lacrimation today. Discussed continuing to check labs.  Patient otherwise denies SOB, chest pain, headaches, fever, constipation, burning mictiuration or cough.     IDT Team Meeting 8/20/2018  DC/Disposition:  8/27/18     Objective:  VITAL SIGNS: /64   Pulse 62   Temp 37.2 °C (98.9 °F)   Resp 18   Ht 1.626 m (5' 4\")   Wt 61 kg (134 lb 8 oz)   SpO2 92%   Breastfeeding? No   BMI 23.09 kg/m²   Gen:runny nose, lacrimation  Psych: Mood and affect appropriate  CV: RRR, no edema  Resp: There is mild b/l  fine crepitations mainly in the middle and lower zones  Abd: NTND  Neuro: Anomic aphasia, given longer pauses patient speaks more. Follows commands 75%    Recent Results (from the past 72 hour(s))   CBC WITHOUT DIFFERENTIAL    Collection Time: 08/24/18  5:57 AM   Result Value Ref Range    WBC 8.9 4.8 - 10.8 K/uL    RBC 3.32 (L) 4.20 - 5.40 M/uL    Hemoglobin 10.1 (L) 12.0 - 16.0 g/dL    Hematocrit 31.2 (L) 37.0 - 47.0 %    MCV 94.0 81.4 - 97.8 fL    MCH 30.4 27.0 - 33.0 pg    MCHC 32.4 (L) 33.6 - 35.0 g/dL    RDW 55.8 (H) 35.9 - 50.0 fL    Platelet Count 226 164 - 446 K/uL    MPV 11.1 9.0 - 12.9 fL   BASIC METABOLIC PANEL    Collection Time: 08/24/18  5:57 AM   Result Value Ref Range    Sodium 142 135 - 145 mmol/L    Potassium 4.0 3.6 - 5.5 mmol/L    Chloride 110 96 - 112 mmol/L    Co2 26 20 - 33 mmol/L    Glucose 87 65 - 99 mg/dL    Bun 18 8 - 22 mg/dL    Creatinine 1.05 0.50 - 1.40 mg/dL    Calcium 9.2 8.5 - 10.5 mg/dL    Anion Gap 6.0 0.0 - 11.9   MAGNESIUM    Collection Time: 08/24/18  5:57 AM   Result Value Ref Range    Magnesium 1.8 1.5 - 2.5 mg/dL   ESTIMATED GFR    Collection Time: 08/24/18  5:57 AM   Result Value Ref Range    GFR If African American >60 >60 mL/min/1.73 m 2 "    GFR If Non  51 (A) >60 mL/min/1.73 m 2       Current Facility-Administered Medications   Medication Frequency   • senna-docusate (PERICOLACE or SENOKOT S) 8.6-50 MG per tablet 2 Tab BID PRN    And   • polyethylene glycol/lytes (MIRALAX) PACKET 1 Packet QDAY PRN    And   • magnesium hydroxide (MILK OF MAGNESIA) suspension 30 mL QDAY PRN    And   • bisacodyl (DULCOLAX) suppository 10 mg QDAY PRN   • magnesium oxide (MAG-OX) tablet 400 mg DAILY   • fluconazole (DIFLUCAN) tablet 100 mg DAILY   • losartan (COZAAR) tablet 25 mg DAILY   • Respiratory Care per Protocol Continuous RT   • Pharmacy Consult Request ...Pain Management Review 1 Each PRN   • tramadol (ULTRAM) 50 MG tablet 50 mg Q4HRS PRN   • acetaminophen (TYLENOL) tablet 650 mg Q4HRS PRN   • artificial tears 1.4 % ophthalmic solution 1 Drop PRN   • benzocaine-menthol (CEPACOL) lozenge 1 Lozenge Q2HRS PRN   • hydrALAZINE (APRESOLINE) tablet 25 mg Q8HRS PRN   • mag hydrox-al hydrox-simeth (MAALOX PLUS ES or MYLANTA DS) suspension 20 mL Q2HRS PRN   • ondansetron (ZOFRAN ODT) dispertab 4 mg 4X/DAY PRN    Or   • ondansetron (ZOFRAN) syringe/vial injection 4 mg 4X/DAY PRN   • traZODone (DESYREL) tablet 50 mg QHS PRN   • sodium chloride (OCEAN) 0.65 % nasal spray 2 Spray PRN   • atorvastatin (LIPITOR) tablet 40 mg Q EVENING   • aspirin (ASA) chewable tab 81 mg DAILY   • nicotine (NICODERM) 7 MG/24HR 7 mg Daily-0600   • levothyroxine (SYNTHROID) tablet 100 mcg AM ES   • metoprolol SR (TOPROL XL) tablet 25 mg DAILY       Orders Placed This Encounter   Procedures   • Diet Order Regular     Standing Status:   Standing     Number of Occurrences:   1     Order Specific Question:   Diet:     Answer:   Regular [1]     Order Specific Question:   Texture/Fiber modifications:     Answer:   Dysphagia 3(Mechanical Soft)specify fluid consistency(question 6) [3]     Order Specific Question:   Consistency/Fluid modifications:     Answer:   Thin Liquids [3]        Assessment:  Active Hospital Problems    Diagnosis   • *Stroke (cerebrum) (HCC)   • Paroxysmal A-fib (HCC)   • Hypertension   • Leukocytosis   • Normocytic anemia   • Hypothyroidism   • Acute kidney injury (HCC)       Medical Decision Making and Plan:  CVA - Patient with left frontal CVA.  Patient started on ASA and Statin  - Patient has anomia, she get hard time to find words  -Continue ASA 81 mg  -PT and OT for mobility and ADLs  -SLP for cognition, speech and swallow. On dysphagia 2 diet, advanced to thin liquids. MBSS on 8/21/18 - patient's diet advanced to Dysphagia 3 diet.     HTN/A fib - Patient on metoprolol. Previously on Losartan 100 mg daily. Per cardiology no need for loop recorder  -Continue Metoprolol 25 mg daily. Per Hospitalist restart Losartan 25 mg daily  -Patient at high risk for fall and inability to make needs known if fallen, will defer discussion of AC to outpatient.      Leukocytosis - Patient started on Levaquin and Flagyl for possible aspiration.  Completed Abx on 8/22/18.  Patient started on Fluconazole per Hospital for 5 days.       Smoking - Patient with history of smoking. Counseling for cessation performed 8/22/18  -Continue Nicotine patch    Hypothyroidism - Patient previously on 100 mcg. Today she is a symptomatic. We recommend to continue on the same dose.     DVT Ppx - Patient transferred on Heparin 5000 U q12H, the patient can walk up to 300 f/d. We recommend to discontinue Heparin.    Allergic Rhinitis: Today, pt c/o runny nose and b/l eye lacrimation, There is recent eosinophilia 10.6. Likely allergic rhinitis. We recommend to start Loratadine trial 10 mg once a day evening time.    ANSHU PopeB.S.       The patient was evaluated with the fellow staff.  I was present for and/or performed the interview as well as personally examined the patient.  I reviewed the fellow's note and agree with the fellow's findings and plan as documented in the fellow's note  except as documented below. Please reference the fellow's note for complete information.     The chart was reviewed and summarized. Available labs, imaging, O2 sats, and EKGs were reviewed. Available nursing, therapy, consultant, and staff notes were reviewed. I am actively involved in the patient's care.     Brief Discussion and Plan:  Discussed with patient about discharge planning. Patient with questions about follow-up. Discussed about follow-up with PCP and cardiologist. Discussed will discharge on Monday and will prescribe to current pharmacy.    Discussed care with Fellow, Dr. Feliz, agree may with allergic rhinitis. Will start Loratadine 10 mg daily.     Total time:  25 minutes.  I spent greater than 50% of the time for patient care, counseling, and coordination on this date, including unit/floor time, and face-to-face time with the patient as per interval events and assessment and plan above. Topics discussed included discharge planning and allergic rhinitis.     Melquiades Abbasi M.D.

## 2018-08-24 NOTE — PROGRESS NOTES
Received shift report and assumed care of patient.  Patient awake, calm and stable, up in wheelchair for meal. Denies pain or discomfort at this time.  Will continue to monitor.

## 2018-08-24 NOTE — DISCHARGE PLANNING
08/23/18  1152   Discharge Instructions - Completed by Case Mgmt   Discharge Location Home with Home Health     Agency Name / Address / Phone [facility] [phone]     Home Health Registered Nurse; Occupational Therapist; Physical Therapist; Speech Therapist     [facility] [place] [facility] [phone]     Medical Equipment Ordered Four Wheeled Walker           Follow-Up       Follow-up With  Details  Why  Contact Info   Carlos Enrique Amin M.D.  Primary Care  On 9/11/2018 Tuesday @ 1:15pm ( we will fax records)   [address] [address]  [phone]

## 2018-08-24 NOTE — CARE PLAN
Problem: Bowel/Gastric:  Goal: Normal bowel function is maintained or improved  Outcome: PROGRESSING AS EXPECTED  Patient denied s/s of constipation. Last BM was today 8/24. Remains continent of bowel.     Problem: Pain Management  Goal: Pain level will decrease to patient's comfort goal  Outcome: PROGRESSING AS EXPECTED  Patient denied any pain or discomfort during shift.

## 2018-08-25 NOTE — CARE PLAN
Problem: Safety  Goal: Will remain free from injury  Outcome: PROGRESSING AS EXPECTED  Pt can be impulsive at times. Educated pt to use call light and wait for assistance before attempting to transfer herself. Bed alarm on and side rails up x2.    Problem: Bowel/Gastric:  Goal: Normal bowel function is maintained or improved  Outcome: PROGRESSING AS EXPECTED  Last BM was 8/24. No s/s of constipation noted. Pt denies abdominal pain.

## 2018-08-25 NOTE — PROGRESS NOTES
Received bedside report; assumed pt care. Pt A/O x 3-4, calm, and stable. Pt denies pain or discomfort. Pt resting comfortably in bed, call light within reach when in room, safety precautions in place. Will continue to monitor.

## 2018-08-25 NOTE — PROGRESS NOTES
Hospital Medicine Progress Note, Adult, Complex               Author: Pily Jeanette Date & Time created: 2018  2:54 PM     Interval History:  CC - HTN, leukocytosis    Pt seen and examined in dining room; ate most of breakfast.    Review of Systems:  Review of Systems   Constitutional: Negative for chills and fever.   HENT: Negative.    Eyes: Negative.    Respiratory: Negative for cough and shortness of breath.    Cardiovascular: Negative for chest pain and palpitations.   Gastrointestinal: Negative for abdominal pain, nausea and vomiting.   Musculoskeletal: Negative.    Neurological: Positive for speech change.       Physical Exam:  Physical Exam   Constitutional: No distress.   HENT:   Head: Normocephalic and atraumatic.   Right Ear: External ear normal.   Left Ear: External ear normal.   Eyes: Conjunctivae and EOM are normal. Right eye exhibits no discharge. Left eye exhibits no discharge.   Neck: Normal range of motion. Neck supple. No tracheal deviation present.   Cardiovascular:   irreg irreg   Pulmonary/Chest: No stridor. No respiratory distress. She has no wheezes.   Decreased BS   Abdominal: Soft. Bowel sounds are normal. She exhibits no distension. There is no tenderness.   Musculoskeletal:   Trace edema   Neurological: She is alert.   dysarthria   Skin: Skin is warm and dry. She is not diaphoretic.   Vitals reviewed.      Labs:        Invalid input(s): [id number]      Recent Labs      18   0557   SODIUM  142   POTASSIUM  4.0   CHLORIDE  110   CO2  26   BUN  18   CREATININE  1.05   MAGNESIUM  1.8   CALCIUM  9.2     Recent Labs      18   0557   GLUCOSE  87     Recent Labs      18   0557   RBC  3.32*   HEMOGLOBIN  10.1*   HEMATOCRIT  31.2*   PLATELETCT  226     Recent Labs      18   0557   WBC  8.9           Hemodynamics:  Temp (24hrs), Av.2 °C (99 °F), Min:37.1 °C (98.7 °F), Max:37.4 °C (99.3 °F)  Temperature: 37.4 °C (99.3 °F)  Pulse  Av.6  Min: 56  Max: 75   Blood  Pressure : 116/69     Respiratory:    Respiration: 17, Pulse Oximetry: 97 %        RML Breath Sounds: Clear, RLL Breath Sounds: Clear, LLL Breath Sounds: Clear  Fluids:    Intake/Output Summary (Last 24 hours) at 08/25/18 1454  Last data filed at 08/25/18 1130   Gross per 24 hour   Intake             1177 ml   Output                0 ml   Net             1177 ml        GI/Nutrition:  Orders Placed This Encounter   Procedures   • Diet Order Regular     Standing Status:   Standing     Number of Occurrences:   1     Order Specific Question:   Diet:     Answer:   Regular [1]     Order Specific Question:   Texture/Fiber modifications:     Answer:   Dysphagia 3(Mechanical Soft)specify fluid consistency(question 6) [3]     Order Specific Question:   Consistency/Fluid modifications:     Answer:   Thin Liquids [3]         Medical Decision Making, by Problem:  S/P LEFT FRONTAL CVA - ASA/statin    AFIB - rate controlled on B-Bl, holding anticoagulation until 2 wks post stroke    HTN - BP controlled on Losartan and Metoprolol    PYURIA - UA +WBC but negative bacteria, no UCx done, WBC better w/ empiric Fluconazole, cont same x 5 days    LEUKOCYTOSIS - WBC not improved w/ empiric antibiotics, therefore started antifungal as above    ANEMIA - follow H/H    BORDERLINE HYPOMAGNESEMIA - F/U labs improved, cont MgOx    HYPOTHYROIDISM - cont Synthroid w/ outpt F/U TFT           Quality-Core Measures   Reviewed items::  Labs reviewed and Medications reviewed  Assessed for rehabilitation services:  Patient was assess for and/or received rehabilitation services during this hospitalization

## 2018-08-25 NOTE — CARE PLAN
"Problem: Skin Integrity  Goal: Risk for impaired skin integrity will decrease  Outcome: PROGRESSING AS EXPECTED  Patient's skin remains intact and free from new or accidental injury this shift.  Will continue to monitor.    Problem: Pain Management  Goal: Pain level will decrease to patient's comfort goal  Outcome: PROGRESSING AS EXPECTED  Pt reports no pain today, shakes head \"no\" when asked if she has any pain.      "

## 2018-08-26 NOTE — CARE PLAN
Problem: Safety  Goal: Will remain free from falls    Intervention: Assess risk factors for falls  Patient has been using the call light when assistance is needed, has not attempted to self-transfer so far this shift. Patient has call light close by, shoes on when out of bed, bed in low position, frequently rounded on to make sure needs are being met, alarms are set on bed and w/c.      Problem: Pain Management  Goal: Pain level will decrease to patient's comfort goal    Intervention: Educate and implement non-pharmacologic comfort measures. Examples: relaxation, distration, play therapy, activity therapy, massage, etc.  Patient denies complaints of pain so far today and has been in room relaxing most of the day.

## 2018-08-26 NOTE — PROGRESS NOTES
Hospital Medicine Progress Note, Adult, Complex               Author: Pily Jeanette Date & Time created: 2018  4:21 PM     Interval History:  CC - HTN, leukocytosis    Pt visiting w/ son at bedside; speech much better.    Review of Systems:  Review of Systems   Constitutional: Negative for chills and fever.   HENT: Negative.    Eyes: Negative.    Respiratory: Negative for cough and shortness of breath.    Cardiovascular: Negative for chest pain and palpitations.   Gastrointestinal: Negative for abdominal pain, nausea and vomiting.   Musculoskeletal: Negative.    Neurological: Positive for speech change.       Physical Exam:  Physical Exam   Constitutional: She is oriented to person, place, and time. No distress.   HENT:   Head: Normocephalic and atraumatic.   Right Ear: External ear normal.   Left Ear: External ear normal.   Eyes: Conjunctivae and EOM are normal. Right eye exhibits no discharge. Left eye exhibits no discharge.   Neck: Normal range of motion. Neck supple. No tracheal deviation present.   Cardiovascular:   irreg irreg   Pulmonary/Chest: No stridor. No respiratory distress. She has no wheezes.   Decreased BS   Abdominal: Soft. Bowel sounds are normal. She exhibits no distension. There is no tenderness.   Musculoskeletal: She exhibits no edema.   Neurological: She is alert and oriented to person, place, and time.   dysarthria   Skin: Skin is warm and dry. She is not diaphoretic.   Vitals reviewed.      Labs:        Invalid input(s): [id number]      Recent Labs      18   0557   SODIUM  142   POTASSIUM  4.0   CHLORIDE  110   CO2  26   BUN  18   CREATININE  1.05   MAGNESIUM  1.8   CALCIUM  9.2     Recent Labs      18   0557   GLUCOSE  87     Recent Labs      18   0557   RBC  3.32*   HEMOGLOBIN  10.1*   HEMATOCRIT  31.2*   PLATELETCT  226     Recent Labs      18   0557   WBC  8.9           Hemodynamics:  Temp (24hrs), Av.7 °C (98 °F), Min:36.4 °C (97.5 °F), Max:36.8 °C (98.3  °F)  Temperature: 36.7 °C (98.1 °F)  Pulse  Av.6  Min: 56  Max: 75   Blood Pressure : 140/64     Respiratory:    Respiration: 18, Pulse Oximetry: 92 %        RML Breath Sounds: Clear, RLL Breath Sounds: Clear, LLL Breath Sounds: Clear  Fluids:    Intake/Output Summary (Last 24 hours) at 18 1621  Last data filed at 18 1200   Gross per 24 hour   Intake             1300 ml   Output                0 ml   Net             1300 ml     Weight: 63.3 kg (139 lb 8.8 oz)  GI/Nutrition:  Orders Placed This Encounter   Procedures   • Diet Order Regular     Standing Status:   Standing     Number of Occurrences:   1     Order Specific Question:   Diet:     Answer:   Regular [1]     Order Specific Question:   Texture/Fiber modifications:     Answer:   Dysphagia 3(Mechanical Soft)specify fluid consistency(question 6) [3]     Order Specific Question:   Consistency/Fluid modifications:     Answer:   Thin Liquids [3]         Medical Decision Making, by Problem:  S/P LEFT FRONTAL CVA - ASA/statin, speech significantly improved.    AFIB - rate controlled on B-Bl, holding anticoagulation until 2 wks post stroke    HTN - cont Losartan and Metoprolol for BP control    PYURIA - UA +WBC but negative bacteria, no UCx done, leukocytosis resolved w/ empiric Fluconazole (which has completed)    ANEMIA - follow H/H    BORDERLINE HYPOMAGNESEMIA - F/U labs improved, will D/C MgOx    HYPOTHYROIDISM - cont Synthroid w/ outpt F/U TFT         Quality-Core Measures   Reviewed items::  Labs reviewed and Medications reviewed  Assessed for rehabilitation services:  Patient was assess for and/or received rehabilitation services during this hospitalization

## 2018-08-26 NOTE — PROGRESS NOTES
Patient is alert, uses the call light when assistance is needed and has not attempted to self-transfer so far this shift. Patient denies complaints of pain and has been up participating in therapies.

## 2018-08-26 NOTE — CARE PLAN
Problem: Bowel/Gastric:  Goal: Normal bowel function is maintained or improved  Outcome: PROGRESSING AS EXPECTED  Pt is having regular bowel movements with last BM on 8/25. No s/s of constipation noted. Pt denies abdominal pain. Normoactive bowel sounds auscultated in all four quadrants.    Problem: Pain Management  Goal: Pain level will decrease to patient's comfort goal  Outcome: PROGRESSING AS EXPECTED  Pt denied pain when asked at start of shift. Pt educated to use call light to notify staff of any changes in pain level tonight.

## 2018-08-27 NOTE — CARE PLAN
Problem: Speech/Swallowing LTGs  Goal: LTG-By discharge, patient will safely swallow  1) Individualized goal:  Regular diet textures and thin liquids with no s/sx of aspiration for 2/2 meals.  2) Interventions:  SLP Swallowing Dysfunction Treatment, SLP Video Swallow Evaluation and SLP Group Treatment     Outcome: DISCHARGED-GOAL NOT MET Date Met: 08/27/18  Patient is safely swallowing dysphagia 3 and thin liquids.  She is also tolerating regular trials, however, she reports them difficult to chew and prefers the dysphagia 3 textures.  Goal: LTG-By discharge, patient will express  1) Individualized goal:  Wants and needs, opinions for health care and self care with 90% acc for word finding in conversation.  2) Interventions:  SLP Speech Language Treatment, SLP Cognitive Skill Development and SLP Group Treatment     Outcome: NOT MET  75-80% overall with extra time needed.   She is variable with speech production, and some times does not require assist to communicate, at other times demonstrates significantly reduced initiation and fluency of speech.  She does always require extra time to respond.  She is able to formulate complete sentences, does display mild to moderate word finding word finding and halting speech prosody, with mild dysarthria of speech.    Problem: Expression STGs  Goal: STG-Within one week, patient will  1) Individualized goal:  Describe sequences with 80% word finding and use of compensatory word finding strategies with min cues.  2) Interventions:  SLP Speech Language Treatment and SLP Group Treatment     Outcome: DISCHARGED-GOAL NOT MET Date Met: 08/27/18  Variable. Patient needs min to mod cues to implement word finding strategies in description tasks.    Problem: Comprehension STGs  Goal: STG-Within one week, patient will  1) Individualized goal:  Perform functional reading tasks with 80% acc.  2) Interventions:  SLP Speech Language Treatment, SLP Cognitive Skill Development and SLP Group  Treatment     Outcome: NOT MET  Patient 60-80% for basic functional reading. With mod cues needed in simple tasks.

## 2018-08-27 NOTE — CARE PLAN
Problem: Dressing  Goal: STG-Within one week, patient will dress LB  1) Individualized Goal:  With SPV, DME and AE prn  2) Interventions: OT E Stim Attended, OT Group Therapy, OT Self Care/ADL, OT Cognitive Skill Dev, OT Community Reintegration, OT Manual Ther Technique, OT Neuro Re-Ed/Balance, OT Sensory Int Techniques, OT Therapeutic Activity, OT Evaluation and OT Therapeutic Exercise.     Outcome: MET Date Met: 08/27/18      Problem: Toileting  Goal: STG-Within one week, patient will complete toileting tasks  1) Individualized Goal:  With SPV, DME and AE prn  2) Interventions: OT E Stim Attended, OT Group Therapy, OT Self Care/ADL, OT Cognitive Skill Dev, OT Community Reintegration, OT Manual Ther Technique, OT Neuro Re-Ed/Balance, OT Sensory Int Techniques, OT Therapeutic Activity, OT Evaluation and OT Therapeutic Exercise.       Outcome: MET Date Met: 08/27/18      Problem: Functional Transfers  Goal: STG-Within one week, patient will transfer to toilet  1) Individualized Goal:  With SPV, DME and AE prn  2) Interventions: OT E Stim Attended, OT Group Therapy, OT Self Care/ADL, OT Cognitive Skill Dev, OT Community Reintegration, OT Manual Ther Technique, OT Neuro Re-Ed/Balance, OT Sensory Int Techniques, OT Therapeutic Activity, OT Evaluation and OT Therapeutic Exercise.   Outcome: MET Date Met: 08/27/18      Problem: OT Long Term Goals  Goal: LTG-By discharge, patient will complete basic self care tasks  1) Individualized Goal:  With Mod I, DME and AE prn  2) Interventions: OT E Stim Attended, OT Group Therapy, OT Self Care/ADL, OT Cognitive Skill Dev, OT Community Reintegration, OT Manual Ther Technique, OT Neuro Re-Ed/Balance, OT Sensory Int Techniques, OT Therapeutic Activity, OT Evaluation and OT Therapeutic Exercise.       Outcome: MET Date Met: 08/27/18    Goal: LTG-By discharge, patient will perform bathroom transfers  1) Individualized Goal:  With SPV, DME and AE prn  2) Interventions: OT E Stim  Attended, OT Group Therapy, OT Self Care/ADL, OT Cognitive Skill Dev, OT Community Reintegration, OT Manual Ther Technique, OT Neuro Re-Ed/Balance, OT Sensory Int Techniques, OT Therapeutic Activity, OT Evaluation and OT Therapeutic Exercise.       Outcome: MET Date Met: 08/27/18

## 2018-08-27 NOTE — PROGRESS NOTES
Pt looking forward to going home this morning with her son, denies complaints, went to dining room for breakfast, will review d/c instructions when son arrives.

## 2018-08-27 NOTE — DISCHARGE SUMMARY
Rehab Discharge Summary    Admission Date: 8/15/2018    Discharge Date: 8/27/2018    Attending Provider: Melquiades Abbasi MD/PhD    Admission Diagnosis:   Active Hospital Problems    Diagnosis   • *Stroke (cerebrum) (HCC)   • Paroxysmal A-fib (HCC)   • Hypertension   • Leukocytosis   • Normocytic anemia   • Hypothyroidism   • Acute kidney injury (HCC)       Discharge Diagnosis:  Active Hospital Problems    Diagnosis   • *Stroke (cerebrum) (HCC)   • Paroxysmal A-fib (HCC)   • Hypertension   • Leukocytosis   • Normocytic anemia   • Hypothyroidism   • Acute kidney injury (HCC)       HPI per H&P:  Patient is a 78 y.o. year-old female with a past medical history significant for Anemia, HTN, Hypothyroidism, and CKD? admitted to [facility] on 8/11/2018  7:55 PM with expression aphasia.  Patient was taken to outside hospital where CT head showed a small area of acute lacunar infarct in the right frontal lobe.  Unknown length of time since onset so patient was not a tPA candidate. CTA was reportedly unremarkable.  Patient was started on ASA and plavix.  Per neurology consult NIHSS of 9 with right drift.  MRI brain showed left frontal acute infarct with petechial hemorrhage as well as remote right frontal cortical injury.  Per neurology concern for A fib and may need Loop recorder.  Echo with an EF of 65 on 8/12/18. Repeat CT head on 8/13/18 with subacute infarct in the left frontal lobe without appreciate petechial hemorrhages. Interventional cardiology was did not recommend implantable loop recorder as A fib had not been documented on telemetry.  Since that time A fib has been seen on telemetry and recommending starting AC in 2 weeks.      Per report patient previously living in senior living facility with family members who check in on her.  Patient was last assessed by PT on 8/14/18 and was SBA for transfer and CGA without assistive device (FWW without assist) with recommendation of home with 24/7  supervision. Patient was last evaluated by SLP on 18 and was recommending FEES for swallow evaluation with dysphagia 1 diet with NTL.   Patient was last evaluated by OT On 18 and was CGA for ADLs.     Patient was admitted to [facility] on 8/15/2018.     Hospital Course by Problem List:  CVA - Patient with left frontal CVA.  Patient started on ASA and Statin. SLP for cognition, speech and swallow. On dysphagia 2 diet, advanced to thin liquids. MBSS on 18 - patient's diet advanced to Dysphagia 3 diet. Patient underwent acute inpatient rehabilitation with PT, OT and SLP from 8/15/18 to 18 with good improvement in function, mobility and swallow.   -Continue ASA 81 mg    HTN/A fib - Patient on metoprolol. Previously on Losartan 100 mg daily. Per cardiology no need for loop recorder. Patient at high risk for fall and inability to make needs known if fallen, will defer discussion of AC to outpatient.   -Continue Metoprolol 25 mg daily. Per Hospitalist restart Losartan 25 mg daily     Leukocytosis - Patient started on Levaquin and Flagyl for possible aspiration.  Completed Abx on 18.  Completed Fluconazole     Smoking - Patient with history of smoking. Counseling for cessation performed 18     Hypothyroidism - Patient previously on 100 mcg. Continue Supplement     DVT Ppx - Patient transferred on Heparin 5000 U q12H, the patient can walk up to 300 f/d. We recommend to discontinue Heparin.     Functional Status at Discharge  Eatin - Standby Prompting/Supervision or Set-up  Eating Description:  Increased time, Modified diet  Groomin - Independent  Grooming Description:  Supervision for safety, Verbal cueing  Bathin - Modified Independent  Bathing Description:  Grab bar, Tub bench, Hand held shower, Increased time  Upper Body Dressin - Independent  Upper Body Dressing Description:     Lower Body Dressin - Independent  Lower Body Dressing Description:  7  - Independent  Discharge Location : Home  Patient Discharging with Assist of: Family   Level of Supervision Required: Intermittent Supervision  Recommended Equipment for Discharge: 4-Wheeled Walker;Tub Transfer Bench  Recommended Services Upon Discharge: Home Health Occupational Therapy  Long Term Goals Met: 2  Long Term Goals Not Met: 0  Criteria for Termination of Services: Maximum Function Achieved for Inpatient Rehabilitation  Walk:  5 - Standby Prompting/Supervision or Set-up  Distance Walked:  Walks a minimum of 150 feet  Walk Description:  Verbal cueing, Supervision for safety, Safety concerns, Walker (x400 feet with 4WW and SBA/SPV)  Wheelchair:  5 - Standby Prompting/Supervision or Set-up  Distance Propelled:  Propels a minimum of 150 feet   Wheelchair Description:  Verbal cueing, Supervision for safety, Safety concerns (x150 feet with BLE and SPV)  Stairs 5 - Standby Prompting/Supervision or Set-up  Stairs DescriptionHand rails, Supervision for safety  Discharge Location: Assisted Living  Patient Discharging with Assist of: Other (See Comments)  Level of Supervision Required Upon Discharge: No Supervision  Recommended Equipment for Discharge: 4-Wheeled Walker  Recommeded Services Upon Discharge: Outpatient Physical Therapy  Long Term Goals Met: 2  Long Term Goals Not Met: 2  Reason(s) for Goals Not Met: pt requires intermittent SPV, pt ambulates with 4WW  Criteria for Termination of Services: Maximum Function Achieved for Inpatient Rehabilitation  Comprehension Mode:  Auditory  Comprehension:  5 - Stand-by Prompting/Supervision or Set-up  Comprehension Description:  Glasses  Expression Mode:  Vocal  Expression:  3 - Moderate Assistance  Expression Description:  Verbal cueing  Social Interaction:  5 - Stand-by Prompting/Supervision or Set-up  Social Interaction Description:  Increased time  Problem Solvin - Minimal Assistance  Problem Solving Description:  Verbal cueing  Memory:  4 - Minimal  Assistance  Memory Description:  Verbal cueing, Therapy schedule       Discharge Medication:     Medication List      CHANGE how you take these medications      Instructions   atorvastatin 40 MG Tabs  What changed:  when to take this  Commonly known as:  LIPITOR  Notes to patient:  Lowers cholesterol   Take 1 Tab by mouth every day.  Dose:  40 mg        CONTINUE taking these medications      Instructions   aspirin 81 MG Chew chewable tablet  Commonly known as:  ASA  Notes to patient:  Decreases blood clot formation   Take 1 Tab by mouth every day.  Dose:  81 mg     levothyroxine 100 MCG Tabs  Commonly known as:  SYNTHROID  Notes to patient:  Thyroid replacement   Take 1 Tab by mouth Every morning on an empty stomach.  Dose:  100 mcg     losartan 100 MG Tabs  Commonly known as:  COZAAR  Notes to patient:  Controls blood pressure   Take 1 Tab by mouth every day.  Dose:  100 mg     metoprolol SR 25 MG Tb24  Commonly known as:  TOPROL XL  Notes to patient:  Controls blood pressure   Take 1 Tab by mouth every day.  Dose:  25 mg        STOP taking these medications    MELATONIN PO     multivitamin Tabs     naproxen 500 MG Tabs  Commonly known as:  NAPROSYN     polyethylene glycol/lytes Pack  Commonly known as:  MIRALAX     senna-docusate 8.6-50 MG Tabs  Commonly known as:  PERICOLACE or SENOKOT S            Discharge Diet:  Regular    Discharge Activity:  As tolerated     Disposition:  Patient to discharge home with family support and community resources.     Equipment:  FWW    Follow-up & Discharge Instructions:  Follow up with your primary care provider (PCP) within 7-10 days of discharge to review your medications and take over your care.     If you develop chest pain, fever, chills, change in neurologic function (weakness, sensation changes, vision changes), or other concerning sxs, seek immediate medical attention or call 911.      Condition on Discharge:  Good    More than 40 minutes was spent on discharging this  patient, including face-to-face time, prescription management, and the dictation of this note.    Melquiades Abbasi M.D.    Date of Service: 8/27/2018

## 2018-08-27 NOTE — CARE PLAN
Problem: Speech/Swallowing LTGs  Goal: LTG-By discharge, patient will express  1) Individualized goal:  Wants and needs, opinions for health care and self care with 90% acc for word finding in conversation.  2) Interventions:  SLP Speech Language Treatment, SLP Cognitive Skill Development and SLP Group Treatment     Outcome: NOT MET  Will need additional time to meet this goal.  75-80% with extra time needed to respond and to prompt to persist

## 2018-08-27 NOTE — DISCHARGE INSTRUCTIONS
[facility] NURSING DISCHARGE INSTRUCTIONS    Blood Pressure : 136/72  Weight: 63.3 kg (139 lb 8.8 oz)  Nursing recommendations for Johana Weston at time of discharge are as follows:  Client verbalized understanding of all discharge instructions and prescriptions.     Review all your home medications and newly ordered medications with your doctor and/or pharmacist. Follow medication instructions as directed by your doctor and/or pharmacist.    Pain Management:   Discharge Pain Medication Instructions:  Comfort Goal: 0, Comfort at Rest, Comfort with Movement, Perform Activity, Sleep Comfortably  Notify your primary care provider if pain is unrelieved with these measures, if the pain is new, or increased in intensity.    Discharge Skin Characteristics: Warm, Dry  Discharge Skin Exam: Clear     Skin / Wound Care Instructions: Please contact your primary care physician for any change in skin integrity.     If You Have Surgical Incisions / Wounds:  Monitor surgical site(s) for signs of increased swelling, redness or symptoms of drainage from the site or fever as this could indicate signs and symptoms of infection. If these symptoms are noted, notifiy your primary care provider.      Discharge Safety Instructions: Should Have ADULT SUPERVISION     Discharge Safety Concerns: Weakness, Unsteady Gait, Other (Comments) (Impulsive at times)  The interdisciplinary team has made recommendation that you should have adult supervision in the house due to weakness and unsteady gait  Anti-embolic stockings are required during the day and off at night to increase circulation to the lower extremities.    Discharge Diet: Regular, Dysphagia 3     Discharge Liquids: Thin  Discharge Bowel Function: Continent  Please contact your primary care physician for any changes in bowel habits.  Discharge Bowel Program:    Discharge Bladder Function: Continent  Discharge Urinary Devices: None      Nursing Discharge Plan:    Smoking Cessation Offered:  (not yet)  Influenza Vaccine Indication: Not indicated: Previously immunized this influenza season and > 8 years of age    Case Management Discharge Instructions:   Discharge Location: Home with Home Health  Agency Name/Address/Phone: [facility] [phone]  Home Health: Registered Nurse, Occupational Therapist, Physical Therapist, Speech Therapist  Outpatient Services:    DME Provider/Phone: [facility]  [phone]  Medical Equipment Ordered: Four Wheeled Walker  Prescription Faxed to:        Discharge Medication Instructions:  Below are the medications your physician expects you to take upon discharge:        Ischemic Stroke  An ischemic stroke is the sudden death of brain tissue. Blood carries oxygen to all areas of the body. This type of stroke happens when your blood does not flow to your brain like normal. Your brain cannot get the oxygen it needs. This is an emergency. It must be treated right away.  Symptoms of a stroke usually happen all of a sudden. You may notice them when you wake up. They can include:  · Weakness or loss of feeling in your face, arm, or leg. This often happens on one side of the body.  · Trouble walking.  · Trouble moving your arms or legs.  · Loss of balance or coordination.  · Feeling confused.  · Trouble talking or understanding what people are saying.  · Slurred speech.  · Trouble seeing.  · Seeing two of one object (double vision).  · Feeling dizzy.  · Feeling sick to your stomach (nauseous) and throwing up (vomiting).  · A very bad headache for no reason.  Get help as soon as any of these problems start. This is important. Some treatments work better if they are given right away. These include:  · Aspirin.  · Medicines to control blood pressure.  · A shot (injection) of medicine to break up the blood clot.  · Treatments given in the blood vessel (artery) to take out the clot or break it up.  Other treatments may  include:  · Oxygen.  · Fluids given through an IV tube.  · Medicines to thin out your blood.  · Procedures to help your blood flow better.  What increases the risk?  Certain things may make you more likely to have a stroke. Some of these are things that you can change, such as:  · Being very overweight (obesity).  · Smoking.  · Taking birth control pills.  · Not being active.  · Drinking too much alcohol.  · Using drugs.  Other risk factors include:  · High blood pressure.  · High cholesterol.  · Diabetes.  · Heart disease.  · Being , , , or .  · Being over age 60.  · Family history of stroke.  · Having had blood clots, stroke, or warning stroke (transient ischemic attack, TIA) in the past.  · Sickle cell disease.  · Being a woman with a history of high blood pressure in pregnancy (preeclampsia).  · Migraine headache.  · Sleep apnea.  · Having an irregular heartbeat (atrial fibrillation).  · Long-term (chronic) diseases that cause soreness and swelling (inflammation).  · Disorders that affect how your blood clots.  Follow these instructions at home:  Medicines  · Take over-the-counter and prescription medicines only as told by your doctor.  · If you were told to take aspirin or another medicine to thin your blood, take it exactly as told by your doctor.  ¨ Taking too much of the medicine can cause bleeding.  ¨ If you do not take enough, it may not work as well.  · Know the side effects of your medicines. If you are taking a blood thinner, make sure you:  ¨ Hold pressure over any cuts for longer than usual.  ¨ Tell your dentist and other doctors that you take this medicine.  ¨ Avoid activities that may cause damage or injury to your body.  Eating and drinking  · Follow instructions from your doctor about what you cannot eat or drink.  · Eat healthy foods.  · If you have trouble with swallowing, do these things to avoid choking:  ¨ Take small bites when  eating.  ¨ Eat foods that are soft or pureed.  Safety  · Follow instructions from your health care team about physical activity.  · Use a walker or cane as told by your doctor.  · Keep your home safe so you do not fall. This may include:  ¨ Having experts look at your home to make sure it is safe.  ¨ Putting grab bars in the bedroom and bathroom.  ¨ Using raised toilets.  ¨ Putting a seat in the shower.  General instructions  · Do not use any tobacco products.  ¨ Examples of these are cigarettes, chewing tobacco, and e-cigarettes.  ¨ If you need help quitting, ask your doctor.  · Limit how much alcohol you drink. This means no more than 1 drink a day for nonpregnant women and 2 drinks a day for men. One drink equals 12 oz of beer, 5 oz of wine, or 1½ oz of hard liquor.  · If you need help to stop using drugs or alcohol, ask your doctor to refer you to a program or specialist.  · Stay active. Exercise as told by your doctor.  · Keep all follow-up visits as told by your doctor. This is important.  Get help right away if:  · You suddenly:  ¨ Have weakness or loss of feeling in your face, arm, or leg.  ¨ Feel confused.  ¨ Have trouble talking or understanding what people are saying.  ¨ Have trouble seeing.  ¨ Have trouble walking.  ¨ Have trouble moving your arms or legs.  ¨ Feel dizzy.  ¨ Lose your balance or coordination.  ¨ Have a very bad headache and you do not know why.  · You pass out (lose consciousness) or almost pass out.  · You have jerky movements that you cannot control (seizure).  These symptoms may be an emergency. Do not wait to see if the symptoms will go away. Get medical help right away. Call your local emergency services (911 in the [place] Do not drive yourself to the hospital.   This information is not intended to replace advice given to you by your health care provider. Make sure you discuss any questions you have with your health care provider.  Document Released: 12/06/2012 Document Revised:  05/30/2017 Document Reviewed: 03/15/2017  [employer] Interactive Patient Education © 2017 [employer]    Stroke Prevention  Some medical conditions and behaviors are associated with an increased chance of having a stroke. You may prevent a stroke by making healthy choices and managing medical conditions.  How can I reduce my risk of having a stroke?  · Stay physically active. Get at least 30 minutes of activity on most or all days.  · Do not smoke. It may also be helpful to avoid exposure to secondhand smoke.  · Limit alcohol use. Moderate alcohol use is considered to be:  ¨ No more than 2 drinks per day for men.  ¨ No more than 1 drink per day for nonpregnant women.  · Eat healthy foods. This involves:  ¨ Eating 5 or more servings of fruits and vegetables a day.  ¨ Making dietary changes that address high blood pressure (hypertension), high cholesterol, diabetes, or obesity.  · Manage your cholesterol levels.  ¨ Making food choices that are high in fiber and low in saturated fat, trans fat, and cholesterol may control cholesterol levels.  ¨ Take any prescribed medicines to control cholesterol as directed by your health care provider.  · Manage your diabetes.  ¨ Controlling your carbohydrate and sugar intake is recommended to manage diabetes.  ¨ Take any prescribed medicines to control diabetes as directed by your health care provider.  · Control your hypertension.  ¨ Making food choices that are low in salt (sodium), saturated fat, trans fat, and cholesterol is recommended to manage hypertension.  ¨ Ask your health care provider if you need treatment to lower your blood pressure. Take any prescribed medicines to control hypertension as directed by your health care provider.  ¨ If you are 18-39 years of age, have your blood pressure checked every 3-5 years. If you are 40 years of age or older, have your blood pressure checked every year.  · Maintain a healthy weight.  ¨ Reducing calorie intake and making food  choices that are low in sodium, saturated fat, trans fat, and cholesterol are recommended to manage weight.  · Stop drug abuse.  · Avoid taking birth control pills.  ¨ Talk to your health care provider about the risks of taking birth control pills if you are over 35 years old, smoke, get migraines, or have ever had a blood clot.  · Get evaluated for sleep disorders (sleep apnea).  ¨ Talk to your health care provider about getting a sleep evaluation if you snore a lot or have excessive sleepiness.  · Take medicines only as directed by your health care provider.  ¨ For some people, aspirin or blood thinners (anticoagulants) are helpful in reducing the risk of forming abnormal blood clots that can lead to stroke. If you have the irregular heart rhythm of atrial fibrillation, you should be on a blood thinner unless there is a good reason you cannot take them.  ¨ Understand all your medicine instructions.  · Make sure that other conditions (such as anemia or atherosclerosis) are addressed.  Get help right away if:  · You have sudden weakness or numbness of the face, arm, or leg, especially on one side of the body.  · Your face or eyelid droops to one side.  · You have sudden confusion.  · You have trouble speaking (aphasia) or understanding.  · You have sudden trouble seeing in one or both eyes.  · You have sudden trouble walking.  · You have dizziness.  · You have a loss of balance or coordination.  · You have a sudden, severe headache with no known cause.  · You have new chest pain or an irregular heartbeat.  Any of these symptoms may represent a serious problem that is an emergency. Do not wait to see if the symptoms will go away. Get medical help at once. Call your local emergency services (911 in [place] Do not drive yourself to the hospital.   This information is not intended to replace advice given to you by your health care provider. Make sure you discuss any questions you have with your health care  provider.  Document Released: 01/25/2006 Document Revised: 05/25/2017 Document Reviewed: 06/20/2014  [employer] Interactive Patient Education © 2017 [employer]    Dysphagia Diet Level 3, Mechanically Advanced  The dysphagia level 3 diet includes foods that are soft, moist, and can be chopped into 1-inch chunks. This diet is helpful for people with mild swallowing difficulties. It reduces the risk of food getting caught in the windpipe, trachea, or lungs.  WHAT DO I NEED TO KNOW ABOUT THIS DIET?  · You may eat foods that are soft and moist.  · If you were on the dysphagia level 1 or level 2 diets, you may eat any of the foods included on those lists.  · Avoid foods that are dry, hard, sticky, chewy, coarse, and crunchy. Also avoid large cuts of food.  · Take small bites. Each bite should contain 1 inch or less of food.  · Thicken liquids if instructed by your health care provider. Follow your health care provider's instructions on how to do this and to what consistency.  · See your dietitian or speech language pathologist regularly for help with your dietary changes.  WHAT FOODS CAN I EAT?  Grains  Moist breads without nuts or seeds. Biscuits, muffins, pancakes, and waffles well-moistened with syrup, jelly, margarine, or butter. Smooth cereals with plenty of milk to moisten them. Moist bread stuffing. Moist rice.  Vegetables  All cooked, soft vegetables. Shredded lettuce. Tender fried potatoes.  Fruits  All canned and cooked fruits. Soft, peeled fresh fruits, such as peaches, nectarines, kiwis, cantaloupe, honeydew melon, and watermelon without seeds. Soft berries, such as strawberries.  Meat and Other Protein Sources  Moist ground or finely diced or sliced meats. Solid, tender cuts of meat. Meatloaf. Hamburger with a bun. Sausage laurita. Deli thin-sliced lunch meat. Chicken, egg, or tuna salad sandwich. Sloppy silvia. Moist fish. Eggs prepared any way. Casseroles with small chunks of meats, ground meats, or tender  meats.  Dairy  Cheese spreads without coarse large chunks. Shredded cheese. Cheese slices. Cottage cheese. Milk at the right texture. Smooth frappes. Yogurt without nuts or coconut. Ask your health care provider whether you can have frozen desserts (such as malts or milk shakes) and thin liquids.  Sweets/Desserts  Soft, smooth, moist desserts. Non-chewy, smooth candy. Jam. Jelly. Honey. Preserves. Ask your health care provider whether you can have frozen desserts.  Fats and Oils  Butter. Oils. Margarine. Mayonnaise. Gravy. Spreads.  Other  All seasonings and sweeteners. All sauces without large chunks.  The items listed above may not be a complete list of recommended foods or beverages. Contact your dietitian for more options.  WHAT FOODS ARE NOT RECOMMENDED?  Grains  Coarse or dry cereals. Dry breads. Toast. Crackers. Tough, crusty breads, such Setswana bread and baguettes. Tough, crisp fried potatoes. Potato skins. Dry bread stuffing. Granola. Popcorn. Chips.  Vegetables  All raw vegetables except shredded lettuce. Cooked corn. Rubbery or stiff cooked vegetables. Stringy vegetables, such as celery.  Fruits  Hard fruits that are difficult to chew, such as apples or pears. Stringy, high-pulp fruits, such as pineapple, papaya, or loly. Fruits with tough skins, such as grapes. Coconut. All dried fruits. Fruit leather. Fruit roll-ups. Fruit snacks.  Meat and Other Protein Sources  Dry or tough meats or poultry. Dry fish. Fish with bones. Peanut butter. All nuts and seeds.  Dairy   Any with nuts, seeds, chocolate chips, dried fruit, coconut, or pineapple.  Sweets/Desserts  Dry cakes. Chewy or dry cookies. Any with nuts, seeds, dry fruits, coconut, pineapple, or anything dry, sticky, or hard. Chewy caramel. Licorice. Taffy-type candies. Ask your health care provider whether you can have frozen desserts.  Fats and Oils  Any with chunks, nuts, seeds, or pineapple. Olives. Pickles.  Other  Soups with tough or large chunks of  "meats, poultry, or vegetables. Corn or clam chowder.  The items listed above may not be a complete list of foods and beverages to avoid. Contact your dietitian for more information.     This information is not intended to replace advice given to you by your health care provider. Make sure you discuss any questions you have with your health care provider.     Document Released: 12/18/2006 Document Revised: 01/08/2016 Document Reviewed: 12/01/2014  [employer] Interactive Patient Education ©2016 [employer].      Food Choices to Lower Your Triglycerides  Triglycerides are a type of fat in your blood. High levels of triglycerides can increase the risk of heart disease and stroke. If your triglyceride levels are high, the foods you eat and your eating habits are very important. Choosing the right foods can help lower your triglycerides.   WHAT GENERAL GUIDELINES DO I NEED TO FOLLOW?  · Lose weight if you are overweight.    · Limit or avoid alcohol.    · Fill one half of your plate with vegetables and green salads.    · Limit fruit to two servings a day. Choose fruit instead of juice.    · Make one fourth of your plate whole grains. Look for the word \"whole\" as the first word in the ingredient list.  · Fill one fourth of your plate with lean protein foods.  · Enjoy fatty fish (such as salmon, mackerel, sardines, and tuna) three times a week.    · Choose healthy fats.    · Limit foods high in starch and sugar.  · Eat more home-cooked food and less restaurant, buffet, and fast food.  · Limit fried foods.  · Cook foods using methods other than frying.  · Limit saturated fats.  · Check ingredient lists to avoid foods with partially hydrogenated oils (trans fats) in them.  WHAT FOODS CAN I EAT?   Grains  Whole grains, such as whole wheat or whole grain breads, crackers, cereals, and pasta. Unsweetened oatmeal, bulgur, barley, quinoa, or brown rice. Corn or whole wheat flour tortillas.   Vegetables  Fresh or frozen vegetables " (raw, steamed, roasted, or grilled). Green salads.  Fruits  All fresh, canned (in natural juice), or frozen fruits.  Meat and Other Protein Products  Ground beef (85% or leaner), grass-fed beef, or beef trimmed of fat. Skinless chicken or turkey. Ground chicken or turkey. Pork trimmed of fat. All fish and seafood. Eggs. Dried beans, peas, or lentils. Unsalted nuts or seeds. Unsalted canned or dry beans.  Dairy  Low-fat dairy products, such as skim or 1% milk, 2% or reduced-fat cheeses, low-fat ricotta or cottage cheese, or plain low-fat yogurt.  Fats and Oils  Tub margarines without trans fats. Light or reduced-fat mayonnaise and salad dressings. Avocado. Safflower, olive, or canola oils. Natural peanut or almond butter.  The items listed above may not be a complete list of recommended foods or beverages. Contact your dietitian for more options.  WHAT FOODS ARE NOT RECOMMENDED?   Grains  White bread. White pasta. White rice. Cornbread. Bagels, pastries, and croissants. Crackers that contain trans fat.  Vegetables  White potatoes. Corn. Creamed or fried vegetables. Vegetables in a cheese sauce.  Fruits  Dried fruits. Canned fruit in light or heavy syrup. Fruit juice.  Meat and Other Protein Products  Fatty cuts of meat. Ribs, chicken wings, elizabeth, sausage, bologna, salami, chitterlings, fatback, hot dogs, bratwurst, and packaged luncheon meats.  Dairy  Whole or 2% milk, cream, half-and-half, and cream cheese. Whole-fat or sweetened yogurt. Full-fat cheeses. Nondairy creamers and whipped toppings. Processed cheese, cheese spreads, or cheese curds.  Sweets and Desserts  Corn syrup, sugars, honey, and molasses. Candy. Jam and jelly. Syrup. Sweetened cereals. Cookies, pies, cakes, donuts, muffins, and ice cream.  Fats and Oils  Butter, stick margarine, lard, shortening, ghee, or elizabeth fat. Coconut, palm kernel, or palm oils.  Beverages  Alcohol. Sweetened drinks (such as sodas, lemonade, and fruit drinks or  punches).  The items listed above may not be a complete list of foods and beverages to avoid. Contact your dietitian for more information.     This information is not intended to replace advice given to you by your health care provider. Make sure you discuss any questions you have with your health care provider.     Document Released: 10/05/2005 Document Revised: 01/08/2016 Document Reviewed: 10/22/2014  [employer] Patient Education ©2016 [employer].        Hypertension  Hypertension is another name for high blood pressure. High blood pressure forces your heart to work harder to pump blood. A blood pressure reading has two numbers, which includes a higher number over a lower number (example: 110/72).  Follow these instructions at home:  · Have your blood pressure rechecked by your doctor.  · Only take medicine as told by your doctor. Follow the directions carefully. The medicine does not work as well if you skip doses. Skipping doses also puts you at risk for problems.  · Do not smoke.  · Monitor your blood pressure at home as told by your doctor.  Contact a doctor if:  · You think you are having a reaction to the medicine you are taking.  · You have repeat headaches or feel dizzy.  · You have puffiness (swelling) in your ankles.  · You have trouble with your vision.  Get help right away if:  · You get a very bad headache and are confused.  · You feel weak, numb, or faint.  · You get chest or belly (abdominal) pain.  · You throw up (vomit).  · You cannot breathe very well.  This information is not intended to replace advice given to you by your health care provider. Make sure you discuss any questions you have with your health care provider.  Document Released: 06/05/2009 Document Revised: 05/25/2017 Document Reviewed: 10/10/2014  [employer] Patient Education © 2017 [employer].      How to Take Your Blood Pressure  HOW DO I GET A BLOOD PRESSURE MACHINE?  · You can buy an electronic home blood  "pressure machine at your local pharmacy. Insurance will sometimes cover the cost if you have a prescription.  · Ask your doctor what type of machine is best for you. There are different machines for your arm and your wrist.  · If you decide to buy a machine to check your blood pressure on your arm, first check the size of your arm so you can buy the right size cuff. To check the size of your arm:    ¨ Use a measuring tape that shows both inches and centimeters.    ¨ Wrap the measuring tape around the upper-middle part of your arm. You may need someone to help you measure.    ¨ Write down your arm measurement in both inches and centimeters.    · To measure your blood pressure correctly, it is important to have the right size cuff.    ¨ If your arm is up to 13 inches (up to 34 centimeters), get an adult cuff size.  ¨ If your arm is 13 to 17 inches (35 to 44 centimeters), get a large adult cuff size.    ¨  If your arm is 17 to 20 inches (45 to 52 centimeters), get an adult thigh cuff.    WHAT DO THE NUMBERS MEAN?   · There are two numbers that make up your blood pressure. For example: 120/80.  ¨ The first number (120 in our example) is called the \"systolic pressure.\" It is a measure of the pressure in your blood vessels when your heart is pumping blood.  ¨ The second number (80 in our example) is called the \"diastolic pressure.\" It is a measure of the pressure in your blood vessels when your heart is resting between beats.  · Your doctor will tell you what your blood pressure should be.  WHAT SHOULD I DO BEFORE I CHECK MY BLOOD PRESSURE?   · Try to rest or relax for at least 30 minutes before you check your blood pressure.  · Do not smoke.  · Do not have any drinks with caffeine, such as:  ¨ Soda.  ¨ Coffee.  ¨ Tea.  · Check your blood pressure in a quiet room.  · Sit down and stretch out your arm on a table. Keep your arm at about the level of your heart. Let your arm relax.  · Make sure that your legs are not " crossed.  HOW DO I CHECK MY BLOOD PRESSURE?  · Follow the directions that came with your machine.  · Make sure you remove any tight-fitting clothing from your arm or wrist. Wrap the cuff around your upper arm or wrist. You should be able to fit a finger between the cuff and your arm. If you cannot fit a finger between the cuff and your arm, it is too tight and should be removed and rewrapped.  · Some units require you to manually pump up the arm cuff.  · Automatic units inflate the cuff when you press a button.  · Cuff deflation is automatic in both models.  · After the cuff is inflated, the unit measures your blood pressure and pulse. The readings are shown on a monitor. Hold still and breathe normally while the cuff is inflated.  · Getting a reading takes less than a minute.  · Some models store readings in a memory. Some provide a printout of readings. If your machine does not store your readings, keep a written record.  · Take readings with you to your next visit with your doctor.  This information is not intended to replace advice given to you by your health care provider. Make sure you discuss any questions you have with your health care provider.  Document Released: 11/30/2009 Document Revised: 01/08/2016 Document Reviewed: 02/12/2015  [employer] Interactive Patient Education © 2017 [employer]    Hypothyroidism  Hypothyroidism is a condition due to under activity of the thyroid gland.  CAUSES   Hypothyroidism may be due to thyroid surgery or disease.   SYMPTOMS   Symptoms are often vague. They may include:  · Fatigue.   · Mental dullness.   · Weakness.   · Hoarseness.   · Constipation.   · Swelling in the face, hands or feet.   · Dry skin.   · Hair loss.   · Sensitivity to cold.   · Menstrual problems.   · Patients with hypothyroidism are also more likely to have problems with infections.   DIAGNOSIS   The diagnosis is confirmed by lab tests for levels of thyroid hormones (TSH, T3, and T4). These tests are  also used to monitor treatment.  TREATMENT   Treatment includes the gradual replacement of thyroid hormones. It is very important that you take your thyroid medicine as prescribed daily, even after you begin to feel better. You will probably require thyroid medicine for the rest of your life. To reduce constipation, eat a diet high in fruits and vegetables. Try to get some exercise every day. Please see your doctor for follow up care as recommended so your treatment can be adjusted as your condition improves.   SEEK IMMEDIATE MEDICAL CARE IF:   You have chest pain, palpitations, shortness of breath, or any other serious symptoms.  Document Released: 01/25/2006 Document Revised: 03/11/2013 Document Reviewed: 12/18/2006  [employer]® Patient Information ©2013 [employer]    Anemia, Nonspecific  Anemia is a condition in which the concentration of red blood cells or hemoglobin in the blood is below normal. Hemoglobin is a substance in red blood cells that carries oxygen to the tissues of the body. Anemia results in not enough oxygen reaching these tissues.  What are the causes?  Common causes of anemia include:  · Excessive bleeding. Bleeding may be internal or external. This includes excessive bleeding from periods (in women) or from the intestine.  · Poor nutrition.    · Chronic kidney, thyroid, and liver disease.  · Bone marrow disorders that decrease red blood cell production.  · Cancer and treatments for cancer.  · HIV, AIDS, and their treatments.  · Spleen problems that increase red blood cell destruction.  · Blood disorders.  · Excess destruction of red blood cells due to infection, medicines, and autoimmune disorders.  What are the signs or symptoms?  · Minor weakness.  · Dizziness.  · Headache.  · Palpitations.  · Shortness of breath, especially with exercise.  · Paleness.  · Cold sensitivity.  · Indigestion.  · Nausea.  · Difficulty sleeping.  · Difficulty concentrating.  Symptoms may occur suddenly or they  may develop slowly.  How is this diagnosed?  Additional blood tests are often needed. These help your health care provider determine the best treatment. Your health care provider will check your stool for blood and look for other causes of blood loss.  How is this treated?  Treatment varies depending on the cause of the anemia. Treatment can include:  · Supplements of iron, vitamin B12, or folic acid.  · Hormone medicines.  · A blood transfusion. This may be needed if blood loss is severe.  · Hospitalization. This may be needed if there is significant continual blood loss.  · Dietary changes.  · Spleen removal.  Follow these instructions at home:  Keep all follow-up appointments. It often takes many weeks to correct anemia, and having your health care provider check on your condition and your response to treatment is very important.  Get help right away if:  · You develop extreme weakness, shortness of breath, or chest pain.  · You become dizzy or have trouble concentrating.  · You develop heavy vaginal bleeding.  · You develop a rash.  · You have bloody or black, tarry stools.  · You faint.  · You vomit up blood.  · You vomit repeatedly.  · You have abdominal pain.  · You have a fever or persistent symptoms for more than 2-3 days.  · You have a fever and your symptoms suddenly get worse.  · You are dehydrated.  This information is not intended to replace advice given to you by your health care provider. Make sure you discuss any questions you have with your health care provider.  Document Released: 01/25/2006 Document Revised: 05/31/2017 Document Reviewed: 06/13/2014  [employer] Interactive Patient Education © 2017 [employer]      Chronic Kidney Disease, Adult  Chronic kidney disease (CKD) happens when the kidneys are damaged during a time of 3 or more months. The kidneys are two organs that do many important jobs in the body. These jobs include:  · Removing wastes and extra fluids from the blood.  · Making  hormones that maintain the amount of fluid in your tissues and blood vessels.  · Making sure that the body has the right amount of fluids and chemicals.  Most of the time, this condition does not go away, but it can usually be controlled. Steps must be taken to slow down the kidney damage or stop it from getting worse. Otherwise, the kidneys may stop working.  Follow these instructions at home:  · Follow your diet as told by your doctor. You may need to avoid alcohol, salty foods (sodium), and foods that are high in potassium, calcium, and protein.  · Take over-the-counter and prescription medicines only as told by your doctor. Do not take any new medicines unless your doctor says you can do that. These include vitamins and minerals.  ¨ Medicines and nutritional supplements can make kidney damage worse.  ¨ Your doctor may need to change how much medicine you take.  · Do not use any tobacco products. These include cigarettes, chewing tobacco, and e-cigarettes. If you need help quitting, ask your doctor.  · Keep all follow-up visits as told by your doctor. This is important.  · Check your blood pressure. Tell your doctor if there are changes to your blood pressure.  · Get to a healthy weight. Stay at that weight. If you need help with this, ask your doctor.  · Start or continue an exercise plan. Try to exercise at least 30 minutes a day, 5 days a week.  · Stay up-to-date with your shots (immunizations) as told by your doctor.  Contact a doctor if:  · Your symptoms get worse.  · You have new symptoms.  Get help right away if:  · You have symptoms of end-stage kidney disease. These include:  ¨ Headaches.  ¨ Skin that is darker or lighter than normal.  ¨ Numbness in your hands or feet.  ¨ Easy bruising.  ¨ Having hiccups often.  ¨ Chest pain.  ¨ Shortness of breath.  ¨ Stopping of menstrual periods in women.  · You have a fever.  · You are making very little pee (urine).  · You have pain or bleeding when you pee  "(urinate).  This information is not intended to replace advice given to you by your health care provider. Make sure you discuss any questions you have with your health care provider.  Document Released: 03/14/2011 Document Revised: 05/25/2017 Document Reviewed: 08/16/2013  [employer] Interactive Patient Education © 2017 [employer]    Smoking Cessation, Tips for Success  If you are ready to quit smoking, congratulations! You have chosen to help yourself be healthier. Cigarettes bring nicotine, tar, carbon monoxide, and other irritants into your body. Your lungs, heart, and blood vessels will be able to work better without these poisons. There are many different ways to quit smoking. Nicotine gum, nicotine patches, a nicotine inhaler, or nicotine nasal spray can help with physical craving. Hypnosis, support groups, and medicines help break the habit of smoking.  WHAT THINGS CAN I DO TO MAKE QUITTING EASIER?   Here are some tips to help you quit for good:  · Pick a date when you will quit smoking completely. Tell all of your friends and family about your plan to quit on that date.  · Do not try to slowly cut down on the number of cigarettes you are smoking. Pick a quit date and quit smoking completely starting on that day.  · Throw away all cigarettes.    · Clean and remove all ashtrays from your home, work, and car.  · On a card, write down your reasons for quitting. Carry the card with you and read it when you get the urge to smoke.  · Cleanse your body of nicotine. Drink enough water and fluids to keep your urine clear or pale yellow. Do this after quitting to flush the nicotine from your body.  · Learn to predict your moods. Do not let a bad situation be your excuse to have a cigarette. Some situations in your life might tempt you into wanting a cigarette.  · Never have \"just one\" cigarette. It leads to wanting another and another. Remind yourself of your decision to quit.  · Change habits associated with smoking. " "If you smoked while driving or when feeling stressed, try other activities to replace smoking. Stand up when drinking your coffee. Brush your teeth after eating. Sit in a different chair when you read the paper. Avoid alcohol while trying to quit, and try to drink fewer caffeinated beverages. Alcohol and caffeine may urge you to smoke.  · Avoid foods and drinks that can trigger a desire to smoke, such as sugary or spicy foods and alcohol.  · Ask people who smoke not to smoke around you.  · Have something planned to do right after eating or having a cup of coffee. For example, plan to take a walk or exercise.  · Try a relaxation exercise to calm you down and decrease your stress. Remember, you may be tense and nervous for the first 2 weeks after you quit, but this will pass.  · Find new activities to keep your hands busy. Play with a pen, coin, or rubber band. Doodle or draw things on paper.  · Brush your teeth right after eating. This will help cut down on the craving for the taste of tobacco after meals. You can also try mouthwash.    · Use oral substitutes in place of cigarettes. Try using lemon drops, carrots, cinnamon sticks, or chewing gum. Keep them handy so they are available when you have the urge to smoke.  · When you have the urge to smoke, try deep breathing.  · Designate your home as a nonsmoking area.  · If you are a heavy smoker, ask your health care provider about a prescription for nicotine chewing gum. It can ease your withdrawal from nicotine.  · Reward yourself. Set aside the cigarette money you save and buy yourself something nice.  · Look for support from others. Join a support group or smoking cessation program. Ask someone at home or at work to help you with your plan to quit smoking.  · Always ask yourself, \"Do I need this cigarette or is this just a reflex?\" Tell yourself, \"Today, I choose not to smoke,\" or \"I do not want to smoke.\" You are reminding yourself of your decision to quit.  · Do " not replace cigarette smoking with electronic cigarettes (commonly called e-cigarettes). The safety of e-cigarettes is unknown, and some may contain harmful chemicals.  · If you relapse, do not give up! Plan ahead and think about what you will do the next time you get the urge to smoke.  HOW WILL I FEEL WHEN I QUIT SMOKING?  You may have symptoms of withdrawal because your body is used to nicotine (the addictive substance in cigarettes). You may crave cigarettes, be irritable, feel very hungry, cough often, get headaches, or have difficulty concentrating. The withdrawal symptoms are only temporary. They are strongest when you first quit but will go away within 10-14 days. When withdrawal symptoms occur, stay in control. Think about your reasons for quitting. Remind yourself that these are signs that your body is healing and getting used to being without cigarettes. Remember that withdrawal symptoms are easier to treat than the major diseases that smoking can cause.   Even after the withdrawal is over, expect periodic urges to smoke. However, these cravings are generally short lived and will go away whether you smoke or not. Do not smoke!  WHAT RESOURCES ARE AVAILABLE TO HELP ME QUIT SMOKING?  Your health care provider can direct you to community resources or hospitals for support, which may include:  · Group support.  · Education.  · Hypnosis.  · Therapy.     This information is not intended to replace advice given to you by your health care provider. Make sure you discuss any questions you have with your health care provider.     Document Released: 09/15/2005 Document Revised: 01/08/2016 Document Reviewed: 06/05/2014  [employer] Interactive Patient Education ©2016 [employer]        Fall Prevention in the Home  Introduction  Falls can cause injuries. They can happen to people of all ages. There are many things you can do to make your home safe and to help prevent falls.  What can I do on the outside of my  home?  · Regularly fix the edges of walkways and driveways and fix any cracks.  · Remove anything that might make you trip as you walk through a door, such as a raised step or threshold.  · Trim any bushes or trees on the path to your home.  · Use bright outdoor lighting.  · Clear any walking paths of anything that might make someone trip, such as rocks or tools.  · Regularly check to see if handrails are loose or broken. Make sure that both sides of any steps have handrails.  · Any raised decks and porches should have guardrails on the edges.  · Have any leaves, snow, or ice cleared regularly.  · Use sand or salt on walking paths during winter.  · Clean up any spills in your garage right away. This includes oil or grease spills.  What can I do in the bathroom?  · Use night lights.  · Install grab bars by the toilet and in the tub and shower. Do not use towel bars as grab bars.  · Use non-skid mats or decals in the tub or shower.  · If you need to sit down in the shower, use a plastic, non-slip stool.  · Keep the floor dry. Clean up any water that spills on the floor as soon as it happens.  · Remove soap buildup in the tub or shower regularly.  · Attach bath mats securely with double-sided non-slip rug tape.  · Do not have throw rugs and other things on the floor that can make you trip.  What can I do in the bedroom?  · Use night lights.  · Make sure that you have a light by your bed that is easy to reach.  · Do not use any sheets or blankets that are too big for your bed. They should not hang down onto the floor.  · Have a firm chair that has side arms. You can use this for support while you get dressed.  · Do not have throw rugs and other things on the floor that can make you trip.  What can I do in the kitchen?  · Clean up any spills right away.  · Avoid walking on wet floors.  · Keep items that you use a lot in easy-to-reach places.  · If you need to reach something above you, use a strong step stool that has a  grab bar.  · Keep electrical cords out of the way.  · Do not use floor polish or wax that makes floors slippery. If you must use wax, use non-skid floor wax.  · Do not have throw rugs and other things on the floor that can make you trip.  What can I do with my stairs?  · Do not leave any items on the stairs.  · Make sure that there are handrails on both sides of the stairs and use them. Fix handrails that are broken or loose. Make sure that handrails are as long as the stairways.  · Check any carpeting to make sure that it is firmly attached to the stairs. Fix any carpet that is loose or worn.  · Avoid having throw rugs at the top or bottom of the stairs. If you do have throw rugs, attach them to the floor with carpet tape.  · Make sure that you have a light switch at the top of the stairs and the bottom of the stairs. If you do not have them, ask someone to add them for you.  What else can I do to help prevent falls?  · Wear shoes that:  ¨ Do not have high heels.  ¨ Have rubber bottoms.  ¨ Are comfortable and fit you well.  ¨ Are closed at the toe. Do not wear sandals.  · If you use a stepladder:  ¨ Make sure that it is fully opened. Do not climb a closed stepladder.  ¨ Make sure that both sides of the stepladder are locked into place.  ¨ Ask someone to hold it for you, if possible.  · Clearly adam and make sure that you can see:  ¨ Any grab bars or handrails.  ¨ First and last steps.  ¨ Where the edge of each step is.  · Use tools that help you move around (mobility aids) if they are needed. These include:  ¨ Canes.  ¨ Walkers.  ¨ Scooters.  ¨ Crutches.  · Turn on the lights when you go into a dark area. Replace any light bulbs as soon as they burn out.  · Set up your furniture so you have a clear path. Avoid moving your furniture around.  · If any of your floors are uneven, fix them.  · If there are any pets around you, be aware of where they are.  · Review your medicines with your doctor. Some medicines can make  you feel dizzy. This can increase your chance of falling.  Ask your doctor what other things that you can do to help prevent falls.  This information is not intended to replace advice given to you by your health care provider. Make sure you discuss any questions you have with your health care provider.  Document Released: 10/14/2010 Document Revised: 05/25/2017 Document Reviewed: 01/22/2016  © 2017 [employer]      Depression, Adult  Depression is feeling sad, low, down in the dumps, blue, gloomy, or empty. In general, there are two kinds of depression:  · Normal sadness or grief. This can happen after something upsetting. It often goes away on its own within 2 weeks. After losing a loved one (bereavement), normal sadness and grief may last longer than two weeks. It usually gets better with time.  · Clinical depression. This kind lasts longer than normal sadness or grief. It keeps you from doing the things you normally do in life. It is often hard to function at home, work, or at school. It may affect your relationships with others. Treatment is often needed.  GET HELP RIGHT AWAY IF:  · You have thoughts about hurting yourself or others.  · You lose touch with reality (psychotic symptoms). You may:  ¨ See or hear things that are not real.  ¨ Have untrue beliefs about your life or people around you.  · Your medicine is giving you problems.  MAKE SURE YOU:  · Understand these instructions.  · Will watch your condition.  · Will get help right away if you are not doing well or get worse.     This information is not intended to replace advice given to you by your health care provider. Make sure you discuss any questions you have with your health care provider.     Document Released: 01/20/2012 Document Revised: 01/08/2016 Document Reviewed: 04/18/2013  [employer] Interactive Patient Education ©2016 [employer]    Helping Someone Who is Suicidal  Suicide is when someone takes his or her own life. Someone who is thinking  about suicide needs immediate help. Although you might not know what to say or do to help, start by letting that person know you care. Listen to him or her. Then talk about how to get help. Help is available through therapy, medicine, and other treatments.  What are signs that someone is suicidal?  Common signs include:  · Signs of depression, such as:  ¨ Rage.  ¨ Irritability.  ¨ Shame.  ¨ Excessive worry.  ¨ Loss of interest in things the person once enjoyed.  · Changes in social behaviors and relationships, including:  ¨ Isolating oneself.  ¨ Withdrawing from friends and family.  ¨ Giving away possessions.  ¨ Saying good-bye.  ¨ Acting aggressively.  ¨ Sleeping more or less than usual.  ¨ Having trouble managing school or work.  ¨ Talking about feeling hopeless or being a burden.  ¨ Engaging in risky behaviors, such as drinking more alcohol or using more drugs.  What are the risk factors for suicide?  Risk factors for suicide include:  · Other suicides in the family.  · A history of suicide attempts.  · Depression or other mental health issues.  · Being in retirement or facing retirement time.  · Having had close friends who have committed suicide.  · Alcohol or drug abuse, especially combined with a mental illness.  What should I do if someone is suicidal?  If you believe a person is in immediate danger of committing suicide, call your local emergency services (911 in the [place] for help.  If a person says he or she wants to commit suicide, take the threat seriously. Help the person get help right away by:  · Calling your local emergency services.  · Calling a suicide prevention hotline.  · Contacting a crisis center or a local suicide prevention center. These are often located at hospitals, clinics, community service organizations, social service providers, or health departments.  If a person confides in you that he or she is considering suicide:  · Listen to the person's thoughts and concerns with compassion.  · Let the  person know you will stay with him or her.  · Ask if the person is having thoughts of hurting himself or herself.  · Offer to help the person get to a doctor or mental health professional.  · Remove all weapons and medicines from the person's living space.  · Do not promise to keep his or her thoughts of suicide a secret.  This information is not intended to replace advice given to you by your health care provider. Make sure you discuss any questions you have with your health care provider.  Document Released: 06/23/2004 Document Revised: 05/25/2017 Document Reviewed: 05/28/2015  [employer] Interactive Patient Education © 2017 [employer]        Physical Therapy Discharge Instructions for Johana Weston    8/26/2018    Weight Bearing Status - Patient Should:  (no restrictions)  Level of Assist Required for Ambulation: Supervision on Flat Surfaces, Supervision on Stairs, Supervision on Curbs  Distance Patient May Ambulate:  (as tolerated)  Device Recommended for Ambulation: 4-Wheeled Walker  Level of Assist Required to Propel Wheelchair: Requires No Assist  Level of Assist Required for Transfers: Requires No Assist  Device Recommended for Transfers: 4-Wheeled Walker  Home Exercise Program: Refer to Home Exercise Program Handout for Details  Prosthesis / Orthosis Recommendation / Location: No Prosthesis  or Orthosis Recommended    It has been a pleasure working with you Catina! Keep up the hard work in your continued recovery.   Take Davida wahl Katie and KAILA Orlando    Occupational Therapy Discharge Instructions for Johana Weston    8/27/2018    Level of Assist Required for Eating: Able to Complete Eating without Assist  Level of Assist Required for Grooming: Able to Complete Grooming without Assist  Level of Assist Required for Dressing: Able to Complete Dressing without Assist  Level of Assist Required for Toileting: Able to Complete Toileting without Assist  Level of Assist Required for Toilet Transfer: Able to  Complete Toilet Transfer without Assist  Equipment for Toilet Transfer: Grab Bars by Toilet, Commode over Toilet  Level of Assist Required for Bathing: Able to Complete Bathing without Assist (Catina, please do not shower unless family present)  Equipment for Bathing: Tub Transfer Bench, Grab Bars in Tub / Shower, Hand Held Shower Head  Level of Assist Required for Shower Transfer: Able to Complete Shower Transfer without Assist  Equipment for Shower Transfer: Grab Bars in Tub / Shower, Tub Transfer Bench  Level of Assist Required for Home Mgmt: Requires Supervision with Home Management  Level of Assist Required for Meal Prep: Requires Supervision with Meal Preparation  Driving: May not Drive, Please Contact Physician for Further Information  Home Exercise Program: Refer to Home Exercise Program Handout for Details  Comments: Catina, It was a pleasure working with you. Sheba remember to lock the brakes of your walker any time you stop or need to take you hand off, such as reaching or scratching your nose. Please continue to consider a tub transfer bench for safe bathing, and please don't bath unless someone is in the house with you. Best of luck with your continued recovery. Lilliana Orr MS, OTR/L      Speech Therapy Discharge Instructions for Johana Weston    8/27/2018    Diet: Mechanical Soft - foods that require less chewing ability such as pancakes, cooked cereal, bread, smooth yogurt, ice cream when cold, cheese, chopped or ground meats, Thin Liquids - any liquid like water, coffee, tea, juices, or clear fluids like Gatorade, etc.    Swallow Strategies: 1.  Swallow 2 times for each bite and sip, to help clear oral residue.  2. Alternate liquids and solids, to help clear oral residue.    Supervision: Please see PT/OT recommendations for supervision during activities.  Recommend supervision with financial, medication, and healthcare managment.  Cognition / Communication: Continue speech therapy services to target  "speech production and reading comprehension. 1.  Slow rate of speech.  2. Pronounce every sound in every word and syllable.  Pay attention to the final sounds in words such as final \"s\"  as in \"fists\" or final \"ed\" such as in \"looked\".  These sounds are unstressed and some times are pronounced loud or clear enough to be heard.  3.  Keep your mouth open when pronouncing words.    It has been a pleasure working with you.  -- Irasema Fitzpatrick M.S. CCC-SLP    "

## 2018-08-27 NOTE — PROGRESS NOTES
Patient discharged to home per order.  Reviewed all discharge instructions, appointments, discharge medications, and wound care instructions with patient and son; they verbalize understanding.  Education provided in discharge instructions about Stroke, low fat diet, hypertension, how to take a blood pressure and Home Safety and Fall Prevention. Discharge paperwork completed; signed copies in chart.  Patient has education binder and all belongings; signed copy in chart.  Pt alert, calm, stable; no change in status from morning assessment.  Patient left facility at 1045 via 4 wheel walker accompanied by son; escorted to car by staff.  Have enjoyed working with this pleasant patient.

## 2018-08-27 NOTE — PROGRESS NOTES
PATIENT DISCHARGE MEDICATION COUNSELING:  This patient, and family with patient permission, received individualized medication counseling regarding the current regimen they are receiving in this facility. Potential adverse effects, monitoring parameters, and proper administration were covered in preparation for their discharge. The patient asked relevant questions regarding the medications for which answers were provided. Our pharmacy hours and phone number were provided for follow up questions should they arise.  Daniel Kumar [facility]

## 2018-08-27 NOTE — PROGRESS NOTES
Received shift report and assumed care of patient.  Patient awake, calm and stable, currently positioned in bed for comfort and safety; call light within reach.  Denies pain or discomfort at this time.  Will continue to monitor, pt plans to be discharged to home today when her son arrives.

## 2018-08-27 NOTE — PROGRESS NOTES
Hospital Medicine Progress Note, Adult, Complex               Author: Nascimento Jeanette Date & Time created: 8/27/2018  9:56 AM     Interval History:  CC - HTN, leukocytosis    Pt appears somewhat anxious conversing on phone.  Getting ready for discharge today.    Review of Systems:  Review of Systems   Constitutional: Negative for chills and fever.   HENT: Negative.    Eyes: Negative.    Respiratory: Negative for cough and shortness of breath.    Cardiovascular: Negative for chest pain and palpitations.   Gastrointestinal: Negative for abdominal pain, nausea and vomiting.   Musculoskeletal: Negative.    Neurological: Positive for speech change.       Physical Exam:  Physical Exam   Constitutional: She is oriented to person, place, and time. No distress.   HENT:   Head: Normocephalic and atraumatic.   Right Ear: External ear normal.   Left Ear: External ear normal.   Eyes: Conjunctivae and EOM are normal. Right eye exhibits no discharge. Left eye exhibits no discharge.   Neck: Normal range of motion. Neck supple. No tracheal deviation present.   Cardiovascular:   irreg irreg   Pulmonary/Chest: No stridor. No respiratory distress. She has no wheezes.   Decreased BS   Abdominal: Soft. Bowel sounds are normal. She exhibits no distension. There is no tenderness.   Musculoskeletal: She exhibits no edema.   Neurological: She is alert and oriented to person, place, and time.   dysarthria   Skin: Skin is warm and dry. She is not diaphoretic.   Vitals reviewed.      Labs:        Invalid input(s): [id number]      No results for input(s): SODIUM, POTASSIUM, CHLORIDE, CO2, BUN, CREATININE, MAGNESIUM, PHOSPHORUS, CALCIUM in the last 72 hours.  No results for input(s): ALTSGPT, ASTSGOT, ALKPHOSPHAT, TBILIRUBIN, DBILIRUBIN, GAMMAGT, AMYLASE, LIPASE, ALB, PREALBUMIN, GLUCOSE in the last 72 hours.  No results for input(s): RBC, HEMOGLOBIN, HEMATOCRIT, PLATELETCT, PROTHROMBTM, APTT, INR, IRON, FERRITIN, TOTIRONBC in the last 72 hours.             Hemodynamics:  Temp (24hrs), Av.8 °C (98.2 °F), Min:36.7 °C (98.1 °F), Max:36.9 °C (98.4 °F)  Temperature: 36.7 °C (98.1 °F)  Pulse  Av.9  Min: 56  Max: 78   Blood Pressure : 142/60     Respiratory:    Respiration: 18, Pulse Oximetry: 91 %        RML Breath Sounds: Clear, RLL Breath Sounds: Clear, LLL Breath Sounds: Clear  Fluids:    Intake/Output Summary (Last 24 hours) at 18 0956  Last data filed at 18 0910   Gross per 24 hour   Intake             1060 ml   Output                0 ml   Net             1060 ml     Weight: 63.3 kg (139 lb 8.8 oz)  GI/Nutrition:  Orders Placed This Encounter   Procedures   • Diet Order Regular     Standing Status:   Standing     Number of Occurrences:   1     Order Specific Question:   Diet:     Answer:   Regular [1]     Order Specific Question:   Texture/Fiber modifications:     Answer:   Dysphagia 3(Mechanical Soft)specify fluid consistency(question 6) [3]     Order Specific Question:   Consistency/Fluid modifications:     Answer:   Thin Liquids [3]         Medical Decision Making, by Problem:  S/P LEFT FRONTAL CVA - ASA/statin, speech significantly improved.    AFIB - rate controlled on B-Bl, holding anticoagulation until 2 wks post stroke    HTN - cont Losartan and Metoprolol for BP control    PYURIA - UA +WBC but negative bacteria, no UCx done, leukocytosis resolved w/ empiric Fluconazole (which has completed)    ANEMIA - follow H/H    BORDERLINE HYPOMAGNESEMIA - F/U labs improved, now off MgOx    HYPOTHYROIDISM - cont Synthroid w/ outpt F/U TFT         Quality-Core Measures   Reviewed items::  Labs reviewed and Medications reviewed  Assessed for rehabilitation services:  Patient was assess for and/or received rehabilitation services during this hospitalization

## 2018-08-27 NOTE — CARE PLAN
Problem: Safety  Goal: Will remain free from injury  Outcome: PROGRESSING AS EXPECTED  Pt has been compliant with call light use so far. Alarms remain in place as patient has history of impulsive behavior. Side rails up x2 and bed is in low and locked position.    Problem: Bowel/Gastric:  Goal: Normal bowel function is maintained or improved  Outcome: PROGRESSING AS EXPECTED  Pt had a large, formed BM this evening. No complaints of abdominal pain or s/s of constipation noted.

## 2018-09-11 NOTE — ED TRIAGE NOTES
.  Chief Complaint   Patient presents with   • Possible Stroke     last spoke on phone with daughter at 2045    • Facial Droop   • Aphasia   • T-5000 GLF   • Shoulder Pain     r shoulder bruise     Pt transferred from [facility] by ems for evaluation of stroke. Pt had CVA 1 month ago to left side per daughter.   Unknown onset of right side weakness, right facial droop. Aphasia.   Pt found down at home by neighbor this morning.   Daughter at bedside

## 2018-09-11 NOTE — ED PROVIDER NOTES
ED Provider Note    Scribed for Quita Gray M.D. by Misti Delgado. 9/11/2018, 3:23 PM.    Means of arrival: [facility]  History obtained from: EMS and nursing  History limited by: Altered mental status    CHIEF COMPLAINT  Chief Complaint   Patient presents with   • Possible Stroke     last spoke on phone with daughter at 2045    • Facial Droop   • Aphasia   • T-5000 GLF   • Shoulder Pain     r shoulder bruise       SARA Weston is a 78 y.o. female who presents to the Emergency Department as a transfer from [facility] after being found down and altered at her elderly apartment by a friend, last seen normal around 8:00pm last night. The patient has a history of a CVA,with some lasting deficits including speech changes, and memory difficulty, but today's symptoms are more extreme with right sided extremity weakness and right sided facial droop. She had no lasting extremity weakness or noticeable facial droop from her past CVA. Patient is supposed to be taking a daily baby aspirin, with no prescribed blood thinners. Imaging completed at transferring facility showed intracranial hemorrhage and possible ischemia, suggesting a stroke.    History is limited secondary to patient's altered mental status.    REVIEW OF SYSTEMS  Pertinent positives include altered mental status, right upper and lower extremity weakness, right facial droop.     ROS limited secondary to patient's altered mental status.    PAST MEDICAL HISTORY   has a past medical history of Anemia; Hypertension; Leucocytosis; Renal disorder; and Stroke (HCC).    SURGICAL HISTORY   has a past surgical history that includes other orthopedic surgery and gyn surgery.    SOCIAL HISTORY  Social History   Substance Use Topics   • Smoking status: Current Every Day Smoker     Packs/day: 1.00     Types: Cigarettes   • Smokeless tobacco: Never Used   • Alcohol use No      History   Drug Use No       FAMILY HISTORY  None noted    CURRENT MEDICATIONS    Current  Facility-Administered Medications:   •  [START ON 9/12/2018] atorvastatin (LIPITOR) tablet 40 mg, 40 mg, Oral, DAILY, Garett Borja M.D.  •  [START ON 9/12/2018] levothyroxine (SYNTHROID) tablet 100 mcg, 100 mcg, Oral, AM ES, Garett Borja M.D.  •  senna-docusate (PERICOLACE or SENOKOT S) 8.6-50 MG per tablet 2 Tab, 2 Tab, Oral, BID, Stopped at 09/11/18 1800 **AND** polyethylene glycol/lytes (MIRALAX) PACKET 1 Packet, 1 Packet, Oral, QDAY PRN **AND** magnesium hydroxide (MILK OF MAGNESIA) suspension 30 mL, 30 mL, Oral, QDAY PRN **AND** bisacodyl (DULCOLAX) suppository 10 mg, 10 mg, Rectal, QDAY PRN, Garett Borja M.D.  •  [START ON 9/12/2018] aspirin (ASA) tablet 325 mg, 325 mg, Oral, DAILY **OR** [START ON 9/12/2018] aspirin (ASA) chewable tab 324 mg, 324 mg, Oral, DAILY **OR** [START ON 9/12/2018] aspirin (ASA) suppository 300 mg, 300 mg, Rectal, DAILY, Garett Borja M.D.  •  labetalol (NORMODYNE,TRANDATE) injection 10 mg, 10 mg, Intravenous, Q4HRS PRN, Garett Borja M.D.  •  hydrALAZINE (APRESOLINE) injection 10 mg, 10 mg, Intravenous, Q6HRS PRN, Garett Borja M.D.  •  MD ALERT - For SBP greater or equal to 160 mmHg, , Other, PRN, Garett Borja M.D.    Current Outpatient Prescriptions:   •  aspirin (ASA) 81 MG Chew Tab chewable tablet, Take 1 Tab by mouth every day., Disp: 100 Tab, Rfl: 2  •  atorvastatin (LIPITOR) 40 MG Tab, Take 1 Tab by mouth every day., Disp: 30 Tab, Rfl: 2  •  levothyroxine (SYNTHROID) 100 MCG Tab, Take 1 Tab by mouth Every morning on an empty stomach., Disp: 30 Tab, Rfl: 2  •  losartan (COZAAR) 100 MG Tab, Take 1 Tab by mouth every day., Disp: 30 Tab, Rfl: 2  •  metoprolol SR (TOPROL XL) 25 MG TABLET SR 24 HR, Take 1 Tab by mouth every day., Disp: 30 Tab, Rfl: 2     ALLERGIES  Allergies   Allergen Reactions   • Codeine Unspecified     Per daughter pt gets dizzy       PHYSICAL EXAM  VITAL SIGNS: Wt 56.7 kg (125 lb)   BMI 21.46 kg/m²     Constitutional: Alert in  mild distress.  HENT: No signs of trauma, Bilateral external ears normal, Nose normal.   Eyes: Pupils are equal and reactive, Conjunctiva normal, Non-icteric.   Neck: Normal range of motion, No tenderness, Supple, No stridor.   Cardiovascular: Regular rate and rhythm, no murmurs.   Thorax & Lungs: Normal breath sounds, No respiratory distress, No wheezing, No chest tenderness.   Abdomen: Bowel sounds normal, Soft, No tenderness, No masses, No peritoneal signs.  Skin: Warm, Dry, No erythema, No rash, ecchymosis to right shoulder   Musculoskeletal:  No major deformities noted. Left upper extremity 5/5 strength, Left lower extremity 4/5 strength  Neurologic: Alert, able to follow commands. right sided facial droop, right arm and right leg weakness, some movements against gravity with her right leg and right arm. Intact sensation on right.       LABS  Labs Reviewed   CBC WITH DIFFERENTIAL - Abnormal; Notable for the following:        Result Value    WBC 11.8 (*)     RBC 3.49 (*)     Hemoglobin 10.7 (*)     Hematocrit 32.7 (*)     MCHC 32.7 (*)     RDW 51.1 (*)     Neutrophils (Absolute) 7.87 (*)     All other components within normal limits   BASIC METABOLIC PANEL - Abnormal; Notable for the following:     Glucose 101 (*)     All other components within normal limits   URINALYSIS,CULTURE IF INDICATED - Abnormal; Notable for the following:     Character Cloudy (*)     Ketones 15 (*)     Protein 30 (*)     Leukocyte Esterase Moderate (*)     Occult Blood Moderate (*)     All other components within normal limits   URINE MICROSCOPIC (W/UA) - Abnormal; Notable for the following:     -150 (*)     RBC  (*)     All other components within normal limits   URINE CULTURE(NEW)   ESTIMATED GFR   HEMOGLOBIN A1C     All labs reviewed by me.    COURSE & MEDICAL DECISION MAKING  Pertinent Labs & Imaging studies reviewed. (See chart for details)    Reviewed old medical records from transferring facility. Patient was last  seen normal by a friend at 8pm last night. Her friend checked on her this morning and found her altered. She recently had a CVA. Lab workup found an intercranial bleed today. Head CT shows a 1cm area of density in left temporal lobe lateral and anterior suggesting small area of hemorrhage, subtle low density in left fontal parietal region that may be a developing ischemia    3:23 PM - Patient seen and examined at bedside. Ordered CBC, BMP, UA to evaluate her symptoms.     4:46 PM Spoke with Dr. Borja, Hospitalist, about the patient's condition. He agrees to see and admit the patient.     4:48 PM Spoke with Dr. Vidal, Neurology, about the patient's condition.  Advises EEG for further evaluation and will come evaluate the patient.    Decision Making:  This is a 78 y.o. year old female who presents with right-sided weakness.  She is about 1 month status post a left frontal stroke.  However her symptoms are significantly worse according to her daughter.  Repeat head CT shows some hemorrhagic conversion in this area.  Labs show some evidence of urinary tract infection as well.  Neurology evaluated patient.  She does recommend admission is unclear if this is recrudescence versus new stroke.  She will need MRI to evaluate for this.  She may also need EEG and evaluation for this worsening right-sided weakness.    DISPOSITION:  Patient will be admitted to Dr. Borja, Hospitalist in guarded condition.    FINAL IMPRESSION  1. Right sided weakness    2. Cerebrovascular accident (CVA), unspecified mechanism (HCC)               This dictation has been created using voice recognition software and/or scribes. The accuracy of the dictation is limited by the abilities of the software and the expertise of the scribes. I expect there may be some errors of grammar and possibly content. I made every attempt to manually correct the errors within my dictation. However, errors related to voice recognition software and/or scribes may still  exist and should be interpreted within the appropriate context.     I, Misti Delgado (Scribe), am scribing for, and in the presence of, Quita Gray M.D..    Electronically signed by: Misti Delgado (Scribe), 9/11/2018    Quita SEAMAN M.D. personally performed the services described in this documentation, as scribed by Misti Delgado in my presence, and it is both accurate and complete.    The note accurately reflects work and decisions made by me.  Quita Gray  9/11/2018  7:46 PM

## 2018-09-12 NOTE — THERAPY
"Occupational Therapy Evaluation completed.   Functional Status:  MAX to total assist ADLs  Plan of Care: Will benefit from Occupational Therapy 3 times per week  Discharge Recommendations:  Equipment: Will Continue to Assess for Equipment Needs. Post-acute therapy Discharge to a transitional care facility for continued skilled therapy services.    Pt is a 77 y/o female admitted due to acute R MCA territory stroke with spastic right side weakness. Pt currently in ICU. Pt was alert at time of eval. Followed one step directions with ~ 50% accuracy. Unable to make any vocalizations. Right weakness with increased tone noted. Pt required total assist supine to sit and MAX assist sit to supine. Sat EOB x 8 min with MOD to MIN assist for sitting balance. Pt able to stand x 10 sec with MAX assist. Pt will need d/c to a transitional care unit for continued therapy post d/c. Pt will need increased support at home and will likely be unable to live independently post d/c. Pt would benefit from continued skilled OT to maximize safety and independence in ADLs and mobility for return to supported community based living.     See \"Rehab Therapy-Acute\" Patient Summary Report for complete documentation.    "

## 2018-09-12 NOTE — CARE PLAN
Problem: Skin Integrity  Goal: Risk for impaired skin integrity will decrease    Intervention: Assess risk factors for impaired skin integrity and/or pressure ulcers  2 Rn skin assessment performed upon arrival to unit. Pt with pink, blanching sacrum. Mepilex applied.

## 2018-09-12 NOTE — PROGRESS NOTES
Patient Diagnosis and Management daily plan per neurology:        1) Acute R MCA territory : embolic in nature, has A FIB, and needs anticoagulation  Unfortunately now, we cannot start Coumadin till 4 more days, or novel OAC in 7 days  MRI brain showed more R MCA acute strokes.  MRA was done in the past, and not quite clear visualization, I would recommend getting CTA head and neck now, as I cannot entirely exclude a R MCA stenosis problem    Old petequial hemorrhagic conversion is a month old, CT showed no bleed, therefore no CI for ASA or SC heparin.  Patient critically ill, critical care provided 35 min   Will have much more neurological deficits, and will not be able to live alone as she was doing after the first stroke.      Complete neurological note to support plan is below.            ROS: Cant provide aphasic      PHYSICAL EXAMINATION:     NIHSS:      1a: level of conciousness0  1b:month/age1  1c:open eyes/close hand2  2. Best gaze0  3:Visual fields2  4: facial palsy1  5: a motor left arm0  5 b: motor right arm3  6 a: motor left leg0  6 b : motor right leg4  7: limb ataxia0  8: sensory1  9: best language3  10: dysarthria0  11: extinction /neglect: 0     Total: 19         Head/Neck: NCAT. no meningismus neg kernig neg brudzinski. No obvious mass or heard bruit. No tender arteries or lost pulses. No rash of head or neck.  Restless, lying in bed      Cardiovascular: Irregular, A FIB today      Pulmonary: Normal breath sounds     Extremities: Normal inspection, No edema, normal pulses     Skin: Warm, dry, intact. No rashes observed head/neck or body  No stigmata     Eyes/Funduscopic: erythema in the Lids, otherwise normal conjunctiva     Mental Status: Globally aphasic     Cranial Nerves: CN II-XII R lower facial weakness,  Pupillary reflex + 4  No afferent pupillary defect. EOM full;  VF full; No nystagmus.                    Motor:  Spastic RUE weakness of 2/5, and RLE 1/5                   Sensory: does  grimaces to pain minimally, but does not move  Coordination: dysmetria unable.     DTR's: +3     Babinski: bilaterally      Gait/Station: unable.                    Vitals:    09/12/18 0600 09/12/18 0700 09/12/18 0800 09/12/18 1000   BP:       Pulse: (!) 59 73 76 86   Resp: (!) 22 18 20 16   Temp:       SpO2: 96%      Weight:       Height:                      Imaging: neuroimaging reviewed and directly visualized by me  MR-BRAIN-W/O   Final Result         1. Age-related cerebral atrophy.      2. Mild periventricular white matter changes consistent with chronic microvascular ischemic gliosis.      3. Moderately large area of acute infarction involving the left superior frontal and parasylvian region. Additional areas of acute gyriform infarction involving the left posterior lateral occipital lobe.      4. Small gyriform cortical areas of acute hemorrhage in the left superior frontal lobe.      US-RENAL   Final Result      No definite hydronephrosis. Examination is technically limited.      3.14 cm exophytic lesion arising from the lower pole of the right kidney likely represents a cyst.      Echogenic 3.75 mm focus in the left kidney may represent a nonobstructing calculus or a prominent intrarenal vessel.      DX-CHEST-PORTABLE (1 VIEW)   Final Result      Cardiomegaly with mild pulmonary vascular congestion and interstitial edema.      Prominent atherosclerotic plaque.         OUTSIDE IMAGES-CT HEAD   Final Result                        Yenifer Floyd M.D.    Diplomate Neurology, Vascular Neurology and Neurophysiology

## 2018-09-12 NOTE — EEG PROGRESS NOTE
VIDEO ELECTROENCEPHALOGRAM REPORT      Referring MD: Dr. Borja.     DOS: 9/12/2018 (total recording of 30 minutes).     INDICATION:  Johana Weston 78 y.o. female presenting with altered mental status, aphasia.     CURRENT ANTIEPILEPTIC REGIMEN: None.     TECHNIQUE: 30 channel video electroencephalogram (EEG) was performed in accordance with the international 10-20 system. The study was reviewed in bipolar and referential montages. The recording examined the patient during wakeful and drowsy state(s).     DESCRIPTION OF THE RECORD:  During the wakefulness, the background showed a symmetrical 8 Hz alpha activity posteriorly with amplitude of 70 mV.  There was reactivity to eye closure/opening.  A normal anterior-posterior gradient was noted with faster beta frequencies seen anteriorly.  During drowsiness, theta/delta frequencies were seen.      ACTIVATION PROCEDURES:   Intermittent Photic stimulation was performed in a stepwise fashion from 1 to 30 Hz, but failed to produce significant background changes.     ICTAL AND/OR INTERICTAL FINDINGS:   Left hemispheric slowing, more so on the left temporal region. Frequent left temporal sharps. No seizures were reported or recorded during the study.     EKG: sampling of the EKG recording demonstrated sinus rhythm.     EVENTS:  Confused.     INTERPRETATION:  This is an abnormal video EEG recording in the awake and drowsy state(s).  Left hemispheric slowing, more so on the left temporal region, as well as frequent left temporal sharps noted. The findings increase risk for seizures and suggest underlying area of cortical irritability and structural abnormality. No seizures captured during the study. Clinical and radiological correlation is recommended.        Kevon Bui MD   Epilepsy and Neurodiagnostics.   Clinical  of Neurology [facility].   Diplomate in Neurology, Epilepsy, and Electrodiagnostic Medicine.    Office: [phone]  Fax: [phone]

## 2018-09-12 NOTE — THERAPY
"Speech Language Therapy Clinical Swallow Evaluation completed.  Functional Status: Clinical swallow evaluation completed on this date.  Patient presented with expressive deficits and suspected receptive deficits.  She had oral groping but no words were produced.  The patient did not follow directives to the oral [facility] exam, query oral apraxia?  Right-sided facial asymmetry noted. Presentation of PO included single ice chips x4.  The patient had absent swallow reflex with all ice chips, increased WOB and coughing with the final ice chip.  No further PO trials were given due to absent swallow trigger and s/sx concerning for penetration/aspiration. At this time recommend the patient continue NPO with alternative source of nutrition via Cortrak.  The patient would benefit from a speech-language evaluation.  SLP following  Recommendations - Diet: Diet / Liquid Recommendation: NPO                          Strategies: to be assessed                          Medication Administration: Medication Administration : Via Gastric Tube  Plan of Care: Will benefit from Speech Therapy 5 times per week  Post-Acute Therapy: Discharge to a transitional care facility for continued skilled therapy services.    See \"Rehab Therapy-Acute\" Patient Summary Report for complete documentation.   "

## 2018-09-12 NOTE — CONSULTS
Critical Care/Pulmonary Consultation    Date of service: 9/11/2018    Consulting Physician: No att. providers found    Chief Complaint: Unresponsive    History of Present Illness: Johana Weston is a 78 y.o. female with a past medical history of recent left frontal thromboembolic stroke early in August 2018, is more atrial fibrillation, was found unresponsive earlier today to the hospital for evaluation.  Admitted to ICU for frequent neurologic monitoring and neurologic evaluation, therefore ICU consult.    ROS: Unable to obtain because patient is aphasic    No current facility-administered medications on file prior to encounter.      Current Outpatient Prescriptions on File Prior to Encounter   Medication Sig Dispense Refill   • aspirin (ASA) 81 MG Chew Tab chewable tablet Take 1 Tab by mouth every day. 100 Tab 2   • atorvastatin (LIPITOR) 40 MG Tab Take 1 Tab by mouth every day. 30 Tab 2   • levothyroxine (SYNTHROID) 100 MCG Tab Take 1 Tab by mouth Every morning on an empty stomach. 30 Tab 2   • losartan (COZAAR) 100 MG Tab Take 1 Tab by mouth every day. 30 Tab 2   • metoprolol SR (TOPROL XL) 25 MG TABLET SR 24 HR Take 1 Tab by mouth every day. 30 Tab 2       Social History   Substance Use Topics   • Smoking status: Current Every Day Smoker     Packs/day: 1.00     Types: Cigarettes   • Smokeless tobacco: Never Used   • Alcohol use No        Past Medical History:   Diagnosis Date   • Anemia    • Hypertension    • Leucocytosis    • Renal disorder    • Stroke (HCC)     8/18       Past Surgical History:   Procedure Laterality Date   • GYN SURGERY      hysterectomy   • OTHER ORTHOPEDIC SURGERY      carpal tunnel surgery       Allergies: Codeine    No family history on file.    Vitals:    09/11/18 1521 09/11/18 1524 09/11/18 1630 09/11/18 1701   Weight: 56.7 kg (125 lb)      Weight % change since last entry.: 0 %      BP:  146/82     Pulse:  67 (!) 55 64   Resp:  18 (!) 23 20   Temp:  36.4 °C (97.5 °F)      09/11/18 1731  09/11/18 1801 09/11/18 1830 09/11/18 1901   Weight:       Weight % change since last entry.:       BP:       Pulse: 61 (!) 58 (!) 57 61   Resp: (!) 22 20 18 18   Temp:        09/11/18 1930 09/11/18 2001   Weight:     Weight % change since last entry.:     BP:     Pulse: (!) 59 (!) 59   Resp:     Temp:         Physical Examination  General: Average built  Neuro/Psych: Right-sided hemiparesis and aphasia present, simple commands  HEENT: Head is normocephalic, atraumatic, PERRLA, EOMI  CVS: S1 and S2 within normal limits, irregular heart rate, no murmurs rubs or gallops, no peripheral edema  Respiratory:  diminished breath sounds bilaterally anterior and lateral chest, trachea is midline, symmetric expansion of the chest with respiration  Abdomen/: Soft, nondistended abdomen, no guarding to palpation  Extremities: No deformities, joint swelling, joint erythema  Skin: Skin rashes, bruises, jaundice except decreased skin turgor    Recent Labs      09/11/18   1721  09/11/18   2250   WBC  11.8*  11.2*   NEUTSPOLYS  66.70   --    LYMPHOCYTES  23.80   --    MONOCYTES  6.40   --    EOSINOPHILS  2.50   --    BASOPHILS  0.30   --      Recent Labs      09/11/18   1721   SODIUM  142   POTASSIUM  3.6   CHLORIDE  109   CO2  23   BUN  14   CREATININE  0.81   CALCIUM  9.0     Recent Labs      09/11/18   1721   GLUCOSE  101*           Invalid input(s): [id number]  DX-CHEST-PORTABLE (1 VIEW)   Final Result      Cardiomegaly with mild pulmonary vascular congestion and interstitial edema.      Prominent atherosclerotic plaque.         OUTSIDE IMAGES-CT HEAD   Final Result      MR-BRAIN-W/O    (Results Pending)   US-RENAL    (Results Pending)       Assessment and Plan:    1.  Recurrent CVA  -MRI brain pending  -Neurology is following  -Frequent neurologic checks  -Speech therapy evaluation pending  -Aspirin once MRI results is reviewed    2.  Encephalopathy  -Due to CVA  -?  Concurrent metabolic versus infection  -Urine cultures  pending   -empiric ceftriaxone  -Ammonia is pending    3.  Paroxysmal atrial fibrillation  -Rate controlled  -Telemetry monitoring    Family meeting: Not available at bedside    Assessment and plan were discussed with the patient ICU nurse

## 2018-09-12 NOTE — PROCEDURES
VIDEO ELECTROENCEPHALOGRAM REPORT        Referring MD: Dr. Borja.      DOS: 9/12/2018 (total recording of 30 minutes).      INDICATION:  Johana Weston 78 y.o. female presenting with altered mental status, aphasia.      CURRENT ANTIEPILEPTIC REGIMEN: None.      TECHNIQUE: 30 channel video electroencephalogram (EEG) was performed in accordance with the international 10-20 system. The study was reviewed in bipolar and referential montages. The recording examined the patient during wakeful and drowsy state(s).      DESCRIPTION OF THE RECORD:  During the wakefulness, the background showed a symmetrical 8 Hz alpha activity posteriorly with amplitude of 70 mV.  There was reactivity to eye closure/opening.  A normal anterior-posterior gradient was noted with faster beta frequencies seen anteriorly.  During drowsiness, theta/delta frequencies were seen.        ACTIVATION PROCEDURES:   Intermittent Photic stimulation was performed in a stepwise fashion from 1 to 30 Hz, but failed to produce significant background changes.      ICTAL AND/OR INTERICTAL FINDINGS:   Left hemispheric slowing, more so on the left temporal region. Frequent left temporal sharps. No seizures were reported or recorded during the study.      EKG: sampling of the EKG recording demonstrated sinus rhythm.      EVENTS:  Confused.      INTERPRETATION:  This is an abnormal video EEG recording in the awake and drowsy state(s).  Left hemispheric slowing, more so on the left temporal region, as well as frequent left temporal sharps noted. The findings increase risk for seizures and suggest underlying area of cortical irritability and structural abnormality. No seizures captured during the study. Clinical and radiological correlation is recommended.           Kevon Bui MD   Epilepsy and Neurodiagnostics.   Clinical  of Neurology [facility].   Diplomate in Neurology, Epilepsy, and  Electrodiagnostic Medicine.   Office: [phone]  Fax: [phone]       ANKIT MACIAS    DD:  09/12/2018 10:43:33  DT:  09/12/2018 10:48:25    D#:  [id number]  Job#:  [id number]

## 2018-09-12 NOTE — CONSULTS
"CC:  \" unreponsive and worse than before\"    Date of Admission: 9/11/2018    Today's Date: 09/11/18    Consulting Physician: Quita Gray M.D.      HPI:    Johana Weston is a 78 y.o. female R handed, who had Left frontal MCA embolic stroke related with A FIB 8/11  Patient was recommended to start on anticoagulation 2 weeks after the stroke, but she was discharged home on antiplatelets and her family is unaware of what she was supposed to do, and per daughter ( Celena) which is home health is who takes care, and they are the ones doing the medications; and they are following what they were told.  Apparently patient lives in elderly apartment alone, and was found unresponsive on the floor with worse R sided weakness and AMS  She was brought for care.    ROS:   Unable to provide       Past Medical History:   Past Medical History:   Diagnosis Date   • Anemia    • Hypertension    • Leucocytosis    • Renal disorder    • Stroke (HCC)     8/18       Past Surgical History:   Past Surgical History:   Procedure Laterality Date   • GYN SURGERY      hysterectomy   • OTHER ORTHOPEDIC SURGERY      carpal tunnel surgery       Social History:   Social History     Social History   • Marital status:      Spouse name: N/A   • Number of children: N/A   • Years of education: N/A     Occupational History   • Not on file.     Social History Main Topics   • Smoking status: Current Every Day Smoker     Packs/day: 1.00     Types: Cigarettes   • Smokeless tobacco: Never Used   • Alcohol use No   • Drug use: No   • Sexual activity: Not on file     Other Topics Concern   • Not on file     Social History Narrative   • No narrative on file       Family History: No family history on file.    Allergies:   Allergies   Allergen Reactions   • Codeine Unspecified     Per daughter pt gets dizzy       No current facility-administered medications for this encounter.     Current Outpatient Prescriptions:   •  aspirin (ASA) 81 MG Chew Tab chewable " tablet, Take 1 Tab by mouth every day., Disp: 100 Tab, Rfl: 2  •  atorvastatin (LIPITOR) 40 MG Tab, Take 1 Tab by mouth every day., Disp: 30 Tab, Rfl: 2  •  levothyroxine (SYNTHROID) 100 MCG Tab, Take 1 Tab by mouth Every morning on an empty stomach., Disp: 30 Tab, Rfl: 2  •  losartan (COZAAR) 100 MG Tab, Take 1 Tab by mouth every day., Disp: 30 Tab, Rfl: 2  •  metoprolol SR (TOPROL XL) 25 MG TABLET SR 24 HR, Take 1 Tab by mouth every day., Disp: 30 Tab, Rfl: 2      PHYSICAL EXAM    Vitals:    09/11/18 1521 09/11/18 1524 09/11/18 1630 09/11/18 1701   BP:  146/82     Pulse:  67 (!) 55 64   Resp:  18 (!) 23 20   Temp:  36.4 °C (97.5 °F)     SpO2:  95% 94% 95%   Weight: 56.7 kg (125 lb)          Head/Neck: NCAT. no meningismus neg kernig neg brudzinski. No obvious mass or heard bruit. No tender arteries or lost pulses. No rash of head or neck.    Cardiovascular: Regular, rhythmic    Pulmonary: Normal breath sounds    Extremities: Normal inspection, No edema, normal pulses    Skin: Warm, dry, intact. No rashes observed head/neck or body  No stigmata    Eyes/Funduscopic: erythema in the Lids, otherwise normal conjunctiva    Mental Status: Globally aphasic, unable to answer any questions    Cranial Nerves: CN II-XII R lower facial weakness,  Pupillary reflex + 4  No afferent pupillary defect. EOM full;  VF full; No nystagmus.       Motor:  Spastic RUE weakness of 2/5, and RLE 1/5       Sensory: does grimaces to pain, but does not move  Coordination: dysmetria absent    DTR's: +3    Babinski: bilaterally     Gait/Station: unable.       NIHSS:     1a: level of conciousness0  1b:month/age3  1c:open eyes/close hand2  2. Best gaze0  3:Visual fields0  4: facial palsy1  5: a motor left arm0  5 b: motor right arm4  6 a: motor left leg0  6 b : motor right leg4  7: limb ataxia0  8: sensory0  9: best language3  10: dysarthria0  11: extinction /neglect: 0    Total: 17      Labs:                          No results for input(s):  SODIUM, POTASSIUM, CHLORIDE, CO2, GLUCOSE, BUN, CPKTOTAL in the last 72 hours.  No results for input(s): SODIUM, POTASSIUM, CHLORIDE, CO2, BUN, CREATININE, MAGNESIUM, PHOSPHORUS, CALCIUM in the last 72 hours.      Results for orders placed or performed during the hospital encounter of 08/11/18   Echocardiogram Comp W/O cont   Result Value Ref Range    Eject.Frac. MOD BP 73.45     Eject.Frac. MOD 4C 67.11     Eject.Frac. MOD 2C 78.51     Left Ventrical Ejection Fraction 65               Imaging: neuroimaging reviewed and directly visualized by me  OUTSIDE IMAGES-CT HEAD   Final Result          Assessment/Plan:    Recent Left frontal embolic stroke: due to A FIB, not anticoagulated during admission because of hemorrhagic transformation  Went home only on ASA, comes with focal encephalopathy and worsening symptoms of the previous stroke : possibilities include : stroke recurrence?  Vs seizure.  MRI brain STAT and EEG are need it ASAP; to determine what to do next    No need for further stroke w/up, because her etiology was found    If she has had another stroke, we cannot acutely anticoagulate  If she has had a seizure then we will do it.    Patient is critically ill, neurological prognosis is uncertain.  Family were  Informed, as well as nurses about the planning.      Yenifer Floyd M.D.    Clinical Professor, [employer]  Diplomate, Neurology , Vascular Neurology, Neuphysiology

## 2018-09-12 NOTE — PROGRESS NOTES
0145 RN contacted MRI via telephone to see if any time slots available for pt to come down. Per MRI technician, no further times available this evening, RN will need to call back at 0700 to schedule a time.    0153 RN paged Dr. Jones to update on MRI delay.    0214 RN updated Dr. Jones via telephone. New orders received and implemented.

## 2018-09-12 NOTE — PROGRESS NOTES
Bedside report received from HIMANSHU Chappell. Initial patient assessment performed, chart and eMAR reviewed. See relevant flowsheet for details. NIHSS performed. Bed is locked and in the lowest position.

## 2018-09-12 NOTE — ASSESSMENT & PLAN NOTE
No current issues meds discontinued as patient is on comfort care status -pending hospice consult for placement and plan of care.

## 2018-09-12 NOTE — ED NOTES
Hourly rounding performed. Assessed patient complaints, offered bathroom, and offered comforts.    Introduced self to patient. Did a quick Neuro Exam

## 2018-09-12 NOTE — CARE PLAN
Problem: Infection  Goal: Will remain free from infection    Intervention: Assess signs and symptoms of infection  Pt at increased risk of infection due to presence of invasive lines and devices. Interventions in place.

## 2018-09-12 NOTE — H&P
"Hospital Medicine History and Physical    Date of Service  9/11/2018    Chief Complaint  Chief Complaint   Patient presents with   • Possible Stroke     last spoke on phone with daughter at 2045    • Facial Droop   • Aphasia   • T-5000 GLF   • Shoulder Pain     r shoulder bruise       History of Presenting Illness  78 y.o. female who presented 9/11/2018 with *Possible Stroke (last spoke on phone with daughter at 2045 ); Facial Droop; Aphasia; T-5000 GLF; and Shoulder Pain (r shoulder bruise)  Diagnosed with subtle L frontal infarct with hemorrhagic conversion early August 2018. She remained globally aphasic and weak. Possibility of paroxysmal atrial fibrillation per Cardiology but nothing on cardiac strips during that time. She was discharged to rehab with a tentative plan to start chronic anticoagulation after 2 weeks.  Dischared from rehab to assisted/senior living with [facility] late August 2018, and it was preferred by the rehab attending to follow up outpatient to discuss and initiate chronic anticoagulation.  Her daughter did indicate that they did not follow up and she also wanted another cardiologist.  Around 800am today, found down in the ground unresponsive by neightbour  Daughter described that she was laying in feces, uncleaned underwear  First seen 900am at [facility] who then transferred her here.  Daughter also said she \"just quit smoking but still has a chronic cough from that\"  Patient herself is confused and nonverbal but this was her state before.   Outside CT head showed possible stroke with enlarged hemorrhagic area.   Mild leukocytosis, mild anemia, not hypoglycemic.  When I saw her at ED, she is sleeping but arousable. Nonverbal, confused, and not moving much on the right side with occasional spasms.  Neurology consulted.      Primary Care Physician  Pcp Pt States None    Consultants  Neurology    Code Status  full    Review of Systems  Review of Systems   Unable to perform ROS: Patient " nonverbal        Past Medical History  Past Medical History:   Diagnosis Date   • Anemia    • Hypertension    • Leucocytosis    • Renal disorder    • Stroke (HCC)            Surgical History  Past Surgical History:   Procedure Laterality Date   • GYN SURGERY      hysterectomy   • OTHER ORTHOPEDIC SURGERY      carpal tunnel surgery       Medications  No current facility-administered medications on file prior to encounter.      Current Outpatient Prescriptions on File Prior to Encounter   Medication Sig Dispense Refill   • aspirin (ASA) 81 MG Chew Tab chewable tablet Take 1 Tab by mouth every day. 100 Tab 2   • atorvastatin (LIPITOR) 40 MG Tab Take 1 Tab by mouth every day. 30 Tab 2   • levothyroxine (SYNTHROID) 100 MCG Tab Take 1 Tab by mouth Every morning on an empty stomach. 30 Tab 2   • losartan (COZAAR) 100 MG Tab Take 1 Tab by mouth every day. 30 Tab 2   • metoprolol SR (TOPROL XL) 25 MG TABLET SR 24 HR Take 1 Tab by mouth every day. 30 Tab 2       Family History  No family history on file.    Social History  Social History   Substance Use Topics   • Smoking status: Current Every Day Smoker     Packs/day: 1.00     Types: Cigarettes   • Smokeless tobacco: Never Used   • Alcohol use No       Allergies  Allergies   Allergen Reactions   • Codeine Unspecified     Per daughter pt gets dizzy        Physical Exam  Laboratory   Hemodynamics  Temp (24hrs), Av.4 °C (97.5 °F), Min:36.4 °C (97.5 °F), Max:36.4 °C (97.5 °F)   Temperature: 36.4 °C (97.5 °F)  Pulse  Av.1  Min: 55  Max: 67 Heart Rate (Monitored): (!) 58  Blood Pressure : 146/82, NIBP: 142/43      Respiratory      Respiration: 18, Pulse Oximetry: 95 %             Physical Exam   Constitutional:   Thin, frail     HENT:   Head: Normocephalic and atraumatic.   Eyes: Conjunctivae and EOM are normal. No scleral icterus.   Neck: Normal range of motion. Neck supple.   Cardiovascular: Normal rate and regular rhythm.  Exam reveals no gallop and no friction  rub.    No murmur heard.  Pulmonary/Chest: Effort normal and breath sounds normal. No respiratory distress. She has no wheezes. She has no rales.   Diminished at bases   Abdominal: Soft. Bowel sounds are normal. She exhibits no distension. There is no tenderness. There is no rebound and no guarding.   Musculoskeletal: She exhibits no edema or tenderness.   Neurological: She is alert. She exhibits abnormal muscle tone. Coordination abnormal.   Spastic R sided hemiparesis  Nonverbal  Severe cognitive deficits   Skin: Skin is warm.   Psychiatric:   Nonverbal  No purpose to movements       Recent Labs      09/11/18   1721   WBC  11.8*   RBC  3.49*   HEMOGLOBIN  10.7*   HEMATOCRIT  32.7*   MCV  93.7   MCH  30.7   MCHC  32.7*   RDW  51.1*   PLATELETCT  250   MPV  11.1     Recent Labs      09/11/18   1721   SODIUM  142   POTASSIUM  3.6   CHLORIDE  109   CO2  23   GLUCOSE  101*   BUN  14   CREATININE  0.81   CALCIUM  9.0     Recent Labs      09/11/18   1721   GLUCOSE  101*                 Lab Results   Component Value Date    TROPONINI 0.02 08/12/2018     Urinalysis:    Lab Results  Component Value Date/Time   SPECGRAVITY 1.023 09/11/2018 1721   GLUCOSEUR Negative 09/11/2018 1721   KETONES 15 (A) 09/11/2018 1721   NITRITE Negative 09/11/2018 1721   WBCURINE 100-150 (A) 09/11/2018 1721   RBCURINE  (A) 09/11/2018 1721   BACTERIA Negative 09/11/2018 1721   EPITHELCELL Negative 09/11/2018 1721        Imaging  Ct-head W/o    Result Date: 8/13/2018 8/13/2018 12:05 AM HISTORY/REASON FOR EXAM:  Left MCA hemorrhagic infarct. TECHNIQUE/EXAM DESCRIPTION AND NUMBER OF VIEWS: CT of the head without contrast. The study was performed on a helical multidetector CT scanner. Contiguous 2.5 mm axial sections were obtained from the skull base through the vertex. Up to date radiation dose reduction adjustments have been utilized to meet ALARA standards for radiation dose reduction. COMPARISON:  8/12/2018 FINDINGS: There is subacute  infarct in the left frontal lobe. Hemorrhagic component is not well appreciated in this study. Nonspecific T2 low densities are noted throughout the white matter. Mild cerebral volume loss is seen. The ventricles, cortical sulci and the  basal cisterns are prominent consistent with mild cerebral volume loss. There is no extra-axial fluid collection, hemorrhage or mass. The skull bones appear normal. The paranasal sinuses are clear. The extracranial soft tissue including orbits appear grossly normal. Paranasal sinuses in the field of view are unremarkable. Mastoids in the field of view are unremarkable.     1.  Subacute infarct in the left frontal lobe. When compared with the previous MRI dated 8/12/2018 the hemorrhagic component is not well appreciated. 2.  Moderate chronic microvascular ischemic disease. 3.  Age-appropriate cerebral atrophy.    Dx-chest-2 Views    Result Date: 8/16/2018 8/16/2018 3:28 PM HISTORY/REASON FOR EXAM:  Leukocytosis TECHNIQUE/EXAM DESCRIPTION:  PA and lateral views of the chest. COMPARISON:  None FINDINGS: The heart is mildly enlarged. Atherosclerotic calcification is seen.  There are hazy bibasilar airspace opacities. No pleural effusion or pneumothorax is identified. There is an exaggerated thoracic kyphosis. Degenerative changes are seen in the spine. Lungs are hyperinflated.     Hazy bibasilar opacities may represent atelectasis. Pneumonitis not excluded. Cardiomegaly. Atherosclerotic plaque. Hyperinflation.     Mr-mra Neck-w/o    Result Date: 8/14/2018 8/14/2018 2:08 PM HISTORY/REASON FOR EXAM:  Expressive aphasia with facial droop. TECHNIQUE/EXAM DESCRIPTION:  MRA of the cervical carotid and vertebral arteries without contrast. The study was performed on a [employer] 1.5 Khloe MRI scanner. MRA of the cervical carotid and vertebral arteries was performed with 2D time-of-flight technique. Additional 3D time-of-flight volume acquisition MRA was performed of the cervical carotid  bifurcation. COMPARISON:  None FINDINGS: Motion artifact degrades the examination and limits evaluation. The common carotid arteries have a normal caliber and course. There is normal bifurcation into internal and external carotid arteries. The internal carotid arteries have a normal caliber, course and appearance. The external carotid arteries are within normal limits. The vertebral arteries have a normal caliber and course. There is a normal vertebral basilar junction. The basilar artery has a normal appearance.     Grossly unremarkable limited MR angiogram of the carotid arteries and vertebral basilar system.     Assessment/Plan     I anticipate this patient will require at least two midnights for appropriate medical management, necessitating inpatient admission.    * CVA (cerebral vascular accident) with hemoorhagic conversion (HCC)- (present on admission)   Assessment & Plan    Unclear if there is a new stroke with new hemorrhagic conversion or worsening hemorrhage of old one  Ordered MRI to clarify  Probably avoid chronic anticoagulation for now, but start ASA and pharmacologic DVT prophylaxis in the morning. MRI results can guide how aggressive we can be with antiplatelets and/or anticoagulation  Possibility of seizure, will get EEG.  Defer seizure prophylaxis to Neurology        Paroxysmal A-fib (HCC)- (present on admission)   Assessment & Plan    Suspected from Cardiology last hospitalization  Telemtry and monitor        Acute kidney injury (HCC)- (present on admission)   Assessment & Plan    Trend Cr-, gentle hydration  Urinalysis reviewed, no bacteria but has positive leuk est and white cells, also has hematuria (which can also further contraindicate chronic anticoagulation  Repeat U/A, get renal US          Hypothyroidism- (present on admission)   Assessment & Plan    Continue Synthroid  TSH ordered        Hypertension- (present on admission)   Assessment & Plan    Permissive hypertension for now however  because of hemorrhagic conversion narrow SBP parameters to between 120 and 160... Keep -160            VTE prophylaxis: SCD  She has bleeding risks (hematuria, hemorrhagic conversion) discuss with Neurology re: pharmacologic DVT prophylaxis     I spent 71 minutes critical care, reviewing the chart, notes, vitals, labs, imaging, ordering labs, evaluating Johana Weston for assessment, enacting the plan above. Counseling Johana Westondaughter, answering questions. Discussed with ED physician. Discussed with Neurology. Made critical decisions with blood pressure and blood thinning. Mecial decision making complex. Time was devoted to counseling and coordinating care including review of records, pertinent lab data and studies, as well as discussing diagnostic evaluation and work up, planned therapeutic interventions and future disposition of care. Where indicated, the assessment and plan reflect discussion of patient with consultants, other healthcare providers, family members, and additional research needed to obtain further information in formulating the plan of care for Johana Weston.

## 2018-09-12 NOTE — PROGRESS NOTES
0010 Attempted to take pt to MRI, pt on gurney in hallway when started to have bloody nose from R nare. Pt then began to gurgle, and was brought back to unit. Pressure applied to nose. NT suction and oral suctioning performed with thick bloody, yellow secretions.    MRI contacted and informed of delay. Per MRI, due to delay no longer able to take pt tonight at this time.

## 2018-09-12 NOTE — PROGRESS NOTES
NIH scale completed at bedside with outgoing RN Misti and day shift RN Chema. Both nurses in agreement.

## 2018-09-12 NOTE — DISCHARGE PLANNING
PMR order received from Dr. Borja.  MCR/AARP is shown for her medical provider.  Presented with stroke-like sx.  W/U & TX evals are pending.  Johana coyne @ [facility] from 08-15-18 to 08-27-18 under the care of Dr. Abbasi. TCC remains monitoring.  This referral will not be forwarded to Physiatry @ this time.  I do appreciate the referral.

## 2018-09-12 NOTE — PROGRESS NOTES
Attempted insertion of nasogastric tube via Cortrak. Patient unable to cooperate, pulling forcefully at equipment. Insertion aborted after sudden occurrence of epistaxis in accessed nare necessitating oral suctioning. Orders for haldol obtained, staff will reattempt insertion.

## 2018-09-12 NOTE — PROGRESS NOTES
RN spoke with Dr. Jones via telephone. Per MD new orders to be placed by MD for anticoagulant therapy, to be given after MRI is checked and cleared. Nursing communication placed.

## 2018-09-12 NOTE — ED NOTES
Med rec updated and complete  Allergies reviewed  Pts daughter reports that pt lives alone and she thinks that she took her medications yesterday (9/10/2018).  Pts daughter reports no antibiotics in the last 30 days.  Pts daughter reports no vitamins.

## 2018-09-12 NOTE — PROGRESS NOTES
Pt arrival to [address] brought from [facility] via [facility] with belongings, medical paperwork and ambu bag. Pt transported on the monitor with RN, CCT and family at side.     Pt attached to in room monitor and oxygen via nasal cannula at 4L. Outside facility hall catheter replaced by RN. CHG bath provided.     Temp. 98.4f hall  Weight: 58.2kg

## 2018-09-12 NOTE — PROGRESS NOTES
[facility] Hospitalist Progress Note    Date of Service: 2018    Chief Complaint  78 y.o. female with hx of recent CVA admitted 2018 after being found unresponsive by a neighbor.  MRI showing new acute CVA and small right frontal hemorrhage.    Interval Problem Update  MRI brain pending  Afib on telemetry  afbrile overnight  Failed swallow evaluation this am.  Cortrak to be placed.  EEG pending  Stopping ceftriaxone  Opens eyes but not following.  Need to get in touch with next of kin    Consultants/Specialty  Neurology  Pulmonary    Disposition  Monitor in ICU        Review of Systems   Unable to perform ROS: Mental acuity      Physical Exam  Laboratory/Imaging   Hemodynamics  No data recorded.   Monitored Temp: 37.4 °C (99.3 °F)  Pulse  Av.6  Min: 55  Max: 92 Heart Rate (Monitored): 80  Blood Pressure : 136/67, NIBP: 136/67      Respiratory      Respiration: 20, Pulse Oximetry: 95 %        RUL Breath Sounds: Clear, RML Breath Sounds: Clear, RLL Breath Sounds: Diminished, JOVI Breath Sounds: Clear, LLL Breath Sounds: Diminished    Fluids    Intake/Output Summary (Last 24 hours) at 18 220  Last data filed at 18   Gross per 24 hour   Intake             1625 ml   Output              685 ml   Net              940 ml       Nutrition  Orders Placed This Encounter   Procedures   • Diet NPO     Standing Status:   Standing     Number of Occurrences:   1     Order Specific Question:   Restrict to:     Answer:   Strict [1]     Physical Exam   Constitutional: She appears well-developed. No distress.   HENT:   Head: Normocephalic and atraumatic.   Nose: Nose normal.   Mouth/Throat: Oropharynx is clear and moist.   Eyes: Conjunctivae and EOM are normal. Left eye exhibits no discharge.   Neck: No tracheal deviation present.   Cardiovascular: Normal rate, regular rhythm and normal heart sounds.    No murmur heard.  Pulses:       Radial pulses are 2+ on the right side, and 2+ on the left side.         Dorsalis pedis pulses are 2+ on the right side, and 2+ on the left side.   Pulmonary/Chest: No stridor. No respiratory distress. She has no wheezes.   Abdominal: Soft. Bowel sounds are normal. She exhibits no distension. There is no tenderness.   Musculoskeletal: She exhibits no edema.   Neurological: She is alert. A cranial nerve deficit is present.   Skin: Skin is warm. She is not diaphoretic.   Psychiatric: She is slowed. Cognition and memory are impaired. She is noncommunicative.   Vitals reviewed.      Recent Labs      09/11/18   1721  09/11/18   2250   WBC  11.8*  11.2*   RBC  3.49*  3.71*   HEMOGLOBIN  10.7*  11.0*   HEMATOCRIT  32.7*  35.7*   MCV  93.7  96.2   MCH  30.7  29.6   MCHC  32.7*  30.8*   RDW  51.1*  53.1*   PLATELETCT  250  266   MPV  11.1  10.7     Recent Labs      09/11/18   1721  09/11/18   2250   SODIUM  142  142   POTASSIUM  3.6  3.5*   CHLORIDE  109  109   CO2  23  24   GLUCOSE  101*  101*   BUN  14  14   CREATININE  0.81  0.86   CALCIUM  9.0  9.3             Recent Labs      09/11/18   2250   TRIGLYCERIDE  72   HDL  49   LDL  52          Assessment/Plan     * CVA (cerebral vascular accident) with hemoorhagic conversion (HCC)- (present on admission)   Assessment & Plan    Neurology consulting  neurochecks  Echo pending  Atorvastatin  9/12 MRI Brain: Moderately large area of acute infarction involving the left superior frontal and parasylvian region. Additional areas of acute gyriform infarction involving the left posterior lateral occipital lobe. Small gyriform cortical areas of acute hemorrhage in the left superior frontal lobe.        Paroxysmal A-fib (HCC)- (present on admission)   Assessment & Plan    Monitor on telemetry  Echo pending  Hold anticoagulation due to MRI finding of small bleed in left superior frontal lobe  Rate cotnrol        Acute kidney injury (HCC)- (present on admission)   Assessment & Plan    Improved on foloow up labs.  Monitor I/O, labs          Hypothyroidism-  (present on admission)   Assessment & Plan    Continue Synthroid 100mcg daily  TSH: 0.24 9/11  Check FT4        Hypertension- (present on admission)   Assessment & Plan    Permissive hypertension for now however because of hemorrhagic conversion narrow SBP parameters to between 120 and 160... Keep -160  Monitor vitals.  Was on outpatient losartan and metoprolol (hold for now)        Normocytic anemia- (present on admission)   Assessment & Plan    Monitor CBC          Quality-Core Measures   Reviewed items::  Radiology images reviewed, Labs reviewed and Medications reviewed  Petersen catheter::  Critically Ill - Requiring Accurate Measurement of Urinary Output  DVT prophylaxis pharmacological::  Contraindicated - High bleeding risk  DVT prophylaxis - mechanical:  SCDs

## 2018-09-12 NOTE — DIETARY
Nutrition Services: Nutrition Support Assessment  Day 1 of admit.  Johana Weston is a 78 y.o. female with admitting DX of CVA (cerebral vascular accident)      Current problem list:  1. Paroxysmal a-fib  2. CVA with hemoorhagic conversion  3. HTN  4. Hypothyroidism  5. XAVI     Assessment:  Estimated Nutritional Needs: based on: Ht: 162.6 cm (estimated), Wt : 58.2 kg via bed scale, IBW: 54.4 kg, BMI: 22.02     Calculation/Equation: REE per MSJ x 1.2 = 1258 kcal/day  Total Calories / day: 1200 - 1500 (Calories / k - 26)  Total Grams Protein / day: 70 - 87 (Grams Protein / k.2 - 1.5)  Total Fluids ml / day: 1457.6 ml    Evaluation:   1. Consult received for TF assessment. SLP recommendations NPO.  2. Enteral access: Cortrak to be placed.    3. Labs: K 3.5, Glucose 101  4. Meds: lipitor, synthroid, electrolyte replacement, pericolace  5. Lactated ringers infusion @ 75 ml/hr  6. Last BM:   7. Replete with Fiber appropriate to meet pt's estimated nutritional needs.       Recommendations/Plan:  Start Replete with Fiber @ 25 ml/hr and advance per protocol to 55 ml/hr (goal rate) to provide 1320 kcal (23 kcal/kg), 84 grams protein (1.4 gm/kg), and 1096 ml free water per day.   Fluids per MD.   Diet upgrades per SLP.       RD following

## 2018-09-12 NOTE — ED NOTES
Pt daughter left to get room at [facility]. Pt sleeping intermittently, in view of nursing station. Pt assisted with turning and repositioning.

## 2018-09-12 NOTE — PROGRESS NOTES
2 RN skin assessment performed upon arrival to unit. Pt with pink, blanching sacrum, otherwise skin intact. Mepilex placed and pillows provided for offloading of bony prominences.

## 2018-09-13 NOTE — ASSESSMENT & PLAN NOTE
Stable with no acute signs of bleeding, monitoring discontinued as patient is on comfort care status

## 2018-09-13 NOTE — PROGRESS NOTES
0400 Cortrak certified RN called several times throughout the night to place cortrak for pt. Unavailable to do so at this time.     0600 PO meds held as Cortrak never able to be placed during the night.

## 2018-09-13 NOTE — CONSULTS
"Reason for PC Consult: Advance Care Planning    Consulted by: Dr. Rico Dumont    Assessment:  General: 78 YOF transferred from [place] after being found down in her apartment. Pt has hx of CVA, with s/s of right sided weakness and right sided facial droop. PMH: anemia, htn, leukocytosis, renal disorder, and stroke.     Dyspnea: No-  (3 L/M. 98%. )  Last BM: 09/11/18-    Pain: No-  (Per RN Assessment)  Depression: Unable to determine  Dementia: Unable to Determine    Spiritual:  Is Sikhism or spirituality important for coping with this illness? Yes-  (Spiritual support offered, but pt's daughter declined. )  Has a  or spiritual provider visit been requested? No    Palliative Performance Scale: 30%    Advance Directive: None-    DPOA: No- NOK, pt's daughterArpita Sample [phone].    POLST: No- During this encounter, pt's daughter, Arpita, completed a POLST form - DNR, selective treatments, and long-term artificial nutrition/feeding tube.     Code Status: Full - During this encounter, pt's daughter, Arpita and 2 sons (Gato and Henry Mcdowell) requested that pt's code status be changed to DNAR/DNI.      Outcome:  PC RN met with pt's daughter, Arpita, and pt's SOLANGE, Cedrick. PC RN introduced self and the role of Palliative Care. Pt is confused with expressive aphasia. Pt lives alone in a senior apartment in [place]. Pt has support from a neighbor, her adult children, and [facility]. PC RN explored Arpita's understanding of pt's overall clinical picture. Arpita aware of pt's poor prognosis/clinical picture as well as the fact that pt now requires 24/7 care. PC RN discussed GOC, code status, POLST, and advance care planning. Arpita is working with  and expressing that her Mom go to [facility] [place] in [place]. Per Arpita, \"My Mom and I were about to complete an advance directive right before this all happened. We had discussed her wishes previously. She would never want to be on a ventilator or live like " "this.\" PC RN validated Arpita's feelings. Arpita requested that pt's code status be changed to DNAR/DNI. PC RN TT'd Dr. Akins to request code status change. Arpita completed a POLST form - DNR, selective treatments, and artificial nutrition/feeding tube. Dr. Akins to sign POLST this afternoon. PC RN provided emotional support, active listening, and validation of feelings during consult. PC RN provided Arpita with PC business card for any follow up questions/concerns. All questions were answered in full.     Updated: Dr. Akins, BS RN, Palliative     Plan: PC RN will continue to follow pt during hospital course.     Recommendations: I do not recommend an ethics or hospice consult at this time because pt is pursuing aggressive treatment. .    Thank you for allowing Palliative Care to participate in this patient's care. Please feel free to call [phone] with any questions or concerns.  "

## 2018-09-13 NOTE — THERAPY
"Physical Therapy Evaluation completed.   Bed Mobility:  Supine to Sit: Maximal Assist  Transfers: Sit to Stand: Maximal Assist  Gait: Level Of Assist: Unable to Participate with No Equipment Needed       Plan of Care: Will benefit from Physical Therapy 4 times per week  Discharge Recommendations: Equipment: Will Continue to Assess for Equipment Needs. Post-acute therapy Discharge to a transitional care facility for continued skilled therapy services.    Pt admitted for CVA workup and presents with hypotonicity to R LE and UE and impaired seated and standing stability. Pt initiated toward bedside after directive to sit up at bedside, but require max A to complete task. Once upright with positioning, pt able to sustain at close CGA statically and min A dynamically. Pt initiated sit to stand at mod to max A for lift off but required mod to max to sustain. Unable to weight shift to initiate gait. Pt nonverbal throughout eval which is similar to her previous admit (pt familiar to this PT). While here, pt will benefit from ongoing acute PT intervention. Anticipate she will require placement upon DC. At current level, recommend SNF.     See \"Rehab Therapy-Acute\" Patient Summary Report for complete documentation.     "

## 2018-09-13 NOTE — PROGRESS NOTES
Pulmonary Critical Care Progress Note      DOA: 9/11/2018    Chief Complaint: unresponsive    History of Present Illness: 78 y.o. female with a past medical history of recent left frontal thromboembolic stroke early in August 2018, is more atrial fibrillation, was found unresponsive earlier today to the hospital for evaluation.  Admitted to ICU for frequent neurologic monitoring and neurologic evaluation, therefore ICU consult      Interval Events:  24 hour interval history reviewed   Tm 99.1  +960cc over last 24hr  Mri brain 9/12 w acute infarct Left sup frontal/parasylvian region  Renal us with nonobstructing stone on left  Wbc 11.4  Na 143  K 3.2  Mg 1.8    LR @ 75    Review of Systems   Unable to perform ROS: Acuity of condition     Physical Exam   Constitutional:   Very frail   HENT:   Head: Normocephalic and atraumatic.   Eyes: Pupils are equal, round, and reactive to light. Conjunctivae are normal.   Neck: No tracheal deviation present.   Cardiovascular: Normal rate and intact distal pulses.    Pulmonary/Chest: She has no wheezes. She has no rales.   Abdominal: Soft. She exhibits no distension. There is no tenderness.   Musculoskeletal: She exhibits no edema.   Neurological: No cranial nerve deficit.   2/5 on right, 4-5/5 on left - does not follow well   Skin: Skin is warm and dry. She is not diaphoretic.   Psychiatric:   encephalopathic   Nursing note and vitals reviewed.      PFSH:  No change.    Respiratory:     Pulse Oximetry: 97 %  Chest Tube Drains:                  Invalid input(s): [id number]    HemoDynamics:  Pulse: 65, Heart Rate (Monitored): 61  Blood Pressure : 159/72, NIBP: (!) 163/56               Neuro:  GCS Total Lev Coma Score: 11           Fluids:  Intake/Output       09/11/18 0700 - 09/12/18 0659 09/12/18 0700 - 09/13/18 0659 09/13/18 0700 - 09/14/18 0659      1656-6899 2522-9107 Total 6939-0525 4588-2627 Total 8383-0005 4606-2298 Total       Intake    I.V.  --  750 750  825  1000  1825  --  -- --    IV Piggyback Volume (IV Piggyback) -- 100 100 -- 100 100 -- -- --    IV Volume (LR) -- 650 650  -- -- --    Total Intake -- 750  1825 -- -- --       Output    Urine  --  555 555  390  475 865  --  -- --    Output (mL) (Urinary Catheter Indwelling Catheter) -- 555 555 390 475 865 -- -- --    Stool  --  -- --  --  -- --  --  -- --    Number of Times Stooled -- 1 x 1 x -- -- -- -- -- --    Total Output -- 555 555 390 475 865 -- -- --       Net I/O     -- 195 195 435 525 960 -- -- --        Weight: 58.9 kg (129 lb 13.6 oz)  Recent Labs      18   042   SODIUM  142  142  143   POTASSIUM  3.6  3.5*  3.2*   CHLORIDE  109  109  109   CO2  23  24  23   BUN  14  14  13   CREATININE  0.81  0.86  0.84   CALCIUM  9.0  9.3  8.7       GI/Nutrition:    Liver Function  Recent Labs      18   042   GLUCOSE  101*  101*  81       Heme:  Recent Labs      18   0428   RBC  3.49*  3.71*  3.73*   HEMOGLOBIN  10.7*  11.0*  11.4*   HEMATOCRIT  32.7*  35.7*  35.4*   PLATELETCT  250  266  229       Infectious Disease:  Monitored Temp 2  Av.3 °C (99.1 °F)  Min: 37.2 °C (99 °F)  Max: 37.5 °C (99.5 °F)  Micro: cultures reviewed  Recent Labs      18   0428   WBC  11.8*  11.2*  11.4*   NEUTSPOLYS  66.70   --    --    LYMPHOCYTES  23.80   --    --    MONOCYTES  6.40   --    --    EOSINOPHILS  2.50   --    --    BASOPHILS  0.30   --    --      Current Facility-Administered Medications   Medication Dose Frequency Provider Last Rate Last Dose   • potassium chloride (KCL) ivpb 10 mEq  10 mEq Q HOUR Gato Akins D.O.       • oxymetazoline (AFRIN) 0.05 % nasal spray 2 Spray  2 Spray QDAY PRN Reji Jones M.D.       • MD Alert...ICU Electrolyte Replacement per Pharmacy   pharmacy to dose Rico Dumont M.D.       • Pharmacy Consult: Enteral  tube feeding - review meds/change route/product selection   PRN Rico Dumont M.D.       • Influenza Vaccine High-Dose pf injection 0.5 mL  0.5 mL Once PRN Rico Dumont M.D.       • levETIRAcetam (KEPPRA) 500 mg in  mL IVPB  500 mg Q12HRS Yenifer Floyd M.D.   Stopped at 09/13/18 0545   • haloperidol lactate (HALDOL) injection 2-5 mg  2-5 mg Q4HRS PRN Gato Akins D.O.       • atorvastatin (LIPITOR) tablet 40 mg  40 mg DAILY Garett Borja M.D.   Stopped at 09/12/18 0600   • levothyroxine (SYNTHROID) tablet 100 mcg  100 mcg AM ES Garett Borja M.D.   Stopped at 09/12/18 0600   • senna-docusate (PERICOLACE or SENOKOT S) 8.6-50 MG per tablet 2 Tab  2 Tab BID Garett Borja M.D.   Stopped at 09/11/18 1800    And   • polyethylene glycol/lytes (MIRALAX) PACKET 1 Packet  1 Packet QDAY PRN Garett Borja M.D.        And   • magnesium hydroxide (MILK OF MAGNESIA) suspension 30 mL  30 mL QDAY PRN Garett Borja M.D.        And   • bisacodyl (DULCOLAX) suppository 10 mg  10 mg QDAY PRN Garett Borja M.D.       • labetalol (NORMODYNE,TRANDATE) injection 10 mg  10 mg Q4HRS PRN Garett Borja M.D.   10 mg at 09/13/18 0530   • hydrALAZINE (APRESOLINE) injection 10 mg  10 mg Q6HRS PRN Garett Borja M.D.       • lactated ringers infusion   Continuous Reji Jones M.D. 75 mL/hr at 09/13/18 0531       Last reviewed on 9/11/2018  5:04 PM by Prince Keith    Quality  Measures:   Medications reviewed, Labs reviewed and Radiology images reviewed                      Assessment/Plan:  Recurrent CVA   - mri brain w acute infarct left superior frontal and parasylvian region   - aspiration/sz precautions   - high intensity statin   - asa   - to start coumadin 9/17 per neuro   - q4 hour neuro checks     Encephalopathy   - related to above   - eeg 9/12 w/o obvious sz   - ammonia 35 9/11   - minimize mind altering/sedating meds    Paroxysmal atrial fibrillation   - continue tele  monitoring   - start coumadin 9/17    Htn   - start losartan     Hypothyroidism   - on replacement   - reviewed tft 9/11    Hypokalemia   - replace to keep > 4     Hypomagnesemia   - replace to keep > 2    Tob Use   - ? If she quit since last cva    Will get Palliative to assist as it appears pt will have significant life long deficits with significantly diminished quality of life, currently remains full code.     Discussed patient condition and risk of morbidity and/or mortality with RN, RT, Therapies, Pharmacy and hospitalist.

## 2018-09-13 NOTE — DISCHARGE PLANNING
Current documentation does not demonstrate the ability to participate/tolerate an IRF level of TX.  I spoke with Arpita, daughter regarding current recommendation for SNF.  Arpita was agreeable with this and would like her Mother to go to [facility] in [place].

## 2018-09-13 NOTE — PROGRESS NOTES
[facility] Hospitalist Progress Note    Date of Service:2018  Note Signed: 2018    Chief Complaint  78 y.o. female with hx of recent CVA admitted 2018 after being found unresponsive by a neighbor.  MRI showing new acute CVA and small right frontal hemorrhage.    Interval Problem Update  Sinus rhythm to lian. Occasion episode of afib per RN  Stable BP  Failed swallow eval. Right nare gastric tube and cortrak to be placed  Expressive aphasia.  Mobilized this am to edge of bed  On 2L NC.        Consultants/Specialty  Neurology  Pulmonary    Disposition  Transfer to medical floor        Review of Systems   Unable to perform ROS: Mental acuity      Physical Exam  Laboratory/Imaging   Hemodynamics  Temp (24hrs), Av.1 °C (98.7 °F), Min:36.4 °C (97.6 °F), Max:37.6 °C (99.6 °F)   Temperature: 37.5 °C (99.5 °F), Monitored Temp: 37.3 °C (99.1 °F)  Pulse  Av  Min: 55  Max: 92 Heart Rate (Monitored): 66  Blood Pressure : 142/58, NIBP: (!) 174/75 (Labetalol given see EMAR)      Respiratory      Respiration: 18, Pulse Oximetry: 93 %        RUL Breath Sounds: Clear, RML Breath Sounds: Clear, RLL Breath Sounds: Diminished, JOVI Breath Sounds: Clear, LLL Breath Sounds: Diminished    Fluids    Intake/Output Summary (Last 24 hours) at 18 0614  Last data filed at 18 0537   Gross per 24 hour   Intake             1690 ml   Output              225 ml   Net             1465 ml       Nutrition  Orders Placed This Encounter   Procedures   • Diet NPO     Standing Status:   Standing     Number of Occurrences:   1     Order Specific Question:   Restrict to:     Answer:   Strict [1]     Physical Exam   Constitutional: She appears well-developed and well-nourished. No distress.   HENT:   Head: Normocephalic and atraumatic.   Nose: Nose normal.   Mouth/Throat: No oropharyngeal exudate.   Eyes: Conjunctivae and EOM are normal. Left eye exhibits no discharge.   Neck: No tracheal deviation present.   Cardiovascular:  Normal rate, regular rhythm and normal heart sounds.    No murmur heard.  Pulses:       Radial pulses are 2+ on the right side, and 2+ on the left side.        Dorsalis pedis pulses are 2+ on the right side, and 2+ on the left side.   Pulmonary/Chest: No respiratory distress. She has no wheezes.   Abdominal: Soft. Bowel sounds are normal. She exhibits no distension. There is no tenderness.   Musculoskeletal: She exhibits no edema.   Neurological: She is alert. A cranial nerve deficit is present.   Skin: Skin is warm. She is not diaphoretic.   Psychiatric: She is slowed. Cognition and memory are impaired. She is noncommunicative.   Vitals reviewed.      Recent Labs      09/11/18   1721 09/11/18   2250  09/13/18   0428   WBC  11.8*  11.2*  11.4*   RBC  3.49*  3.71*  3.73*   HEMOGLOBIN  10.7*  11.0*  11.4*   HEMATOCRIT  32.7*  35.7*  35.4*   MCV  93.7  96.2  94.9   MCH  30.7  29.6  30.6   MCHC  32.7*  30.8*  32.2*   RDW  51.1*  53.1*  51.2*   PLATELETCT  250  266  229   MPV  11.1  10.7  10.9     Recent Labs      09/11/18   1721  09/11/18   2250  09/13/18   0428   SODIUM  142  142  143   POTASSIUM  3.6  3.5*  3.2*   CHLORIDE  109  109  109   CO2  23  24  23   GLUCOSE  101*  101*  81   BUN  14  14  13   CREATININE  0.81  0.86  0.84   CALCIUM  9.0  9.3  8.7             Recent Labs      09/11/18   2250   TRIGLYCERIDE  72   HDL  49   LDL  52          Assessment/Plan     * CVA (cerebral vascular accident) with hemoorhagic conversion (HCC)- (present on admission)   Assessment & Plan    Neurology consulting  neurochecks  Echo grade II diastolic dysfunction, RVSP:55 reserved EF  Atorvastatin  9/12 MRI Brain: Moderately large area of acute infarction involving the left superior frontal and parasylvian region. Additional areas of acute gyriform infarction involving the left posterior lateral occipital lobe. Small gyriform cortical areas of acute hemorrhage in the left superior frontal lobe.        Paroxysmal A-fib (HCC)-  (present on admission)   Assessment & Plan    Monitor on telemetry  Echo: reserved EF, grade II diastolic dysfunction, enlarged right atrium  Hold anticoagulation due to MRI finding of small bleed in left superior frontal lobe  Restart coumadin 9/17/18 per neurology recommendations.  Rate cotnrol        Hypokalemia   Assessment & Plan    Replace w/ supplement  Check Mg and BMP tomorrow am.        Hypertension- (present on admission)   Assessment & Plan    Permissive hypertension for now however because of hemorrhagic conversion narrow SBP parameters to between 120 and 160... Keep -160  Monitor vitals.  Was on outpatient losartan and metoprolol (hold for now)  Losartan 50mg        Normocytic anemia- (present on admission)   Assessment & Plan    Monitor CBC        Hypothyroidism- (present on admission)   Assessment & Plan    Continue Synthroid 100mcg daily  TSH: 0.24 9/11  FT4:1.4 9/11          Quality-Core Measures   Reviewed items::  Labs reviewed, Medications reviewed and Radiology images reviewed  Petersen catheter::  Critically Ill - Requiring Accurate Measurement of Urinary Output  DVT prophylaxis pharmacological::  Contraindicated - High bleeding risk  DVT prophylaxis - mechanical:  SCDs

## 2018-09-13 NOTE — THERAPY
"Speech Language Therapy dysphagia treatment completed.   Functional Status:  Patient seen for dysphagia therapy on this date. Cortrak had not been placed yet, though she did have orders for tube feeding.  Patient made spontaneous vocalizations x2 but no purposeful speech. She did not follow any directives to dysphagia exercises despite MAX verbal and tactile cues.  Presentation of PO included single ice chips x2.  The patient triggered a swallow x1 but was then followed by anterior spillage of copious amounts saliva.  The patient sat upright and began to rock back and forth.  Oral suctioning completed via [place].  As the session progressed, RN entered to place Cortrak.    Recommendations: Continue NPO, please initiate tube feeding.  SLP following.  The patient would benefit from an aphasia evaluation.     Plan of Care: Will benefit from Speech Therapy 5 times per week  Post-Acute Therapy: Discharge to a transitional care facility for continued skilled therapy services.    See \"Rehab Therapy-Acute\" Patient Summary Report for complete documentation.     "

## 2018-09-13 NOTE — PROGRESS NOTES
This is a brief note and will be followed by full progress/consult note  Patient with Left MCA occlusion : which is another factor for her stroke: intracranial stenosis.  And in this particular case I cannot be certain if that or the A FIB played a mayor role.  Regardless she needs Coumadin to start Sept 17th [facility] goal INR 2-3  She also will need PEG and placement as not much improvement is expected.  Neuro off

## 2018-09-13 NOTE — PROGRESS NOTES
Cortrak Placement    Tube Team verified patient name and medical record number prior to tube placement.  Cortrak tube (43 inches, 12 Hungarian) placed at 75 cm in right nare.  Per Cortrak picture, tube appears to be in the stomach.  Nursing Instructions: Awaiting KUB to confirm placement before use for medications or feeding. Once placement confirmed, flush tube with 30 ml of water, and then remove and save stylet, in patient medication drawer.

## 2018-09-13 NOTE — PROGRESS NOTES
Bed bath and CHG wipes provided for pt along with mobility. 2 RN skin assessment performed by Misti DOWNS and Chema RN simultaneously. Skin loose, with red bruising to R shoulder. Heels pink but blanching. Sacrum pink and blanching. New mepilex applied.

## 2018-09-13 NOTE — PROGRESS NOTES
Patient Diagnosis and Management daily plan per neurology:        1) Acute R MCA territory : embolic in nature, has A FIB, and needs anticoagulation  Patient with Left MCA occlusion : which is another factor for her stroke: intracranial stenosis.  And in this particular case I cannot be certain if that or the A FIB played a mayor role.  Regardless she needs Coumadin to start Sept 17th [facility] goal INR 2-3  She also will need PEG and placement as not much improvement is expected.  Neuro off        Complete neurological note to support plan is below.            ROS: Cant provide aphasic      PHYSICAL EXAMINATION:     NIHSS:      1a: level of conciousness0  1b:month/age3  1c:open eyes/close hand2  2. Best gaze0  3:Visual fields2  4: facial palsy1  5: a motor left arm0  5 b: motor right arm3  6 a: motor left leg0  6 b : motor right leg4  7: limb ataxia0  8: sensory1  9: best language3  10: dysarthria0  11: extinction /neglect: 0     Total: 19         Head/Neck: NCAT. no meningismus neg kernig neg brudzinski. No obvious mass or heard bruit. No tender arteries or lost pulses. No rash of head or neck.  Restless, lying in bed      Cardiovascular: Irregular, A FIB today      Pulmonary: Normal breath sounds     Extremities: Normal inspection, No edema, normal pulses     Skin: Warm, dry, intact. No rashes observed head/neck or body  No stigmata     Eyes/Funduscopic: erythema in the Lids, otherwise normal conjunctiva     Mental Status: Globally aphasic     Cranial Nerves: CN II-XII R lower facial weakness,  Pupillary reflex + 4  No afferent pupillary defect. EOM full;  VF full; No nystagmus.                    Motor:  Spastic RUE weakness of 2/5, and RLE 1/5                   Sensory: does grimaces to pain minimally, but does not move  Coordination: dysmetria unable.     DTR's: +3     Babinski: bilaterally      Gait/Station: unable.                    Vitals:    09/13/18 0600 09/13/18 0700 09/13/18 0800 09/13/18 0900   BP:        Pulse: 65 68 71 67   Resp: 20 (!) 40 (!) 25 (!) 23   Temp:       SpO2: 97% 98% 94% 94%   Weight:       Height:                      Imaging: neuroimaging reviewed and directly visualized by me  CT-CTA NECK WITH & W/O-POST PROCESSING   Final Result      1.  Mild atherosclerotic narrowing of the common carotid arteries, worse on the LEFT, with less than 50% luminal narrowing.   2.  No focal high-grade stenosis, dissection or occlusion of the cervical carotid or vertebral arteries.      CT-CTA HEAD WITH & W/O-POST PROCESS   Final Result      1.  Segmental occlusion of superior division sylvian branch LEFT MCA with prompt reconstitution.   2.  Evolving LEFT MCA distribution infarct.   3.  Small LEFT anterior temporal intraparenchymal hemorrhage, stable.   4.  Small chronic RIGHT posterior frontal infarct.      These findings were discussed with Dr. Restrepo  of interventional radiology on 9/12/2018 6:14 PM.      MR-BRAIN-W/O   Final Result         1. Age-related cerebral atrophy.      2. Mild periventricular white matter changes consistent with chronic microvascular ischemic gliosis.      3. Moderately large area of acute infarction involving the left superior frontal and parasylvian region. Additional areas of acute gyriform infarction involving the left posterior lateral occipital lobe.      4. Small gyriform cortical areas of acute hemorrhage in the left superior frontal lobe.      US-RENAL   Final Result      No definite hydronephrosis. Examination is technically limited.      3.14 cm exophytic lesion arising from the lower pole of the right kidney likely represents a cyst.      Echogenic 3.75 mm focus in the left kidney may represent a nonobstructing calculus or a prominent intrarenal vessel.      DX-CHEST-PORTABLE (1 VIEW)   Final Result      Cardiomegaly with mild pulmonary vascular congestion and interstitial edema.      Prominent atherosclerotic plaque.         OUTSIDE IMAGES-CT HEAD   Final Result                         Yenifer Floyd M.D.    Diplomate Neurology, Vascular Neurology and Neurophysiology

## 2018-09-13 NOTE — PROGRESS NOTES
Patient arrived from [facility]. VSS will continue to monitor bp.  NIH completed at bedside with ICU RN.

## 2018-09-13 NOTE — CARE PLAN
Problem: Safety  Goal: Will remain free from falls  Outcome: PROGRESSING AS EXPECTED      Problem: Pain Management  Goal: Pain level will decrease to patient's comfort goal  Outcome: PROGRESSING AS EXPECTED      Problem: Respiratory:  Goal: Respiratory status will improve  Outcome: PROGRESSING AS EXPECTED

## 2018-09-13 NOTE — CARE PLAN
Problem: Infection  Goal: Will remain free from infection    Intervention: Assess signs and symptoms of infection  Pt at increased risk for infection due to presence of invasive lines and devices. Interventions in place.      Problem: Skin Integrity  Goal: Risk for impaired skin integrity will decrease    Intervention: Assess risk factors for impaired skin integrity and/or pressure ulcers  Pt at risk for skin breakdown due to limited mobility. Pt turned q2hrs with pillows in position for offloading of bony prominences. Mepilex on sacrum. Pt with bruising to R upper arm and shoulder.

## 2018-09-13 NOTE — PROGRESS NOTES
Cortrak Placement    Tube Team verified patient name and medical record number prior to tube placement.  Cortrak tube (55 inches, 10 Mongolian) placed at 75 cm in right nare.   Nursing Instructions: Awaiting KUB to confirm placement before use for medications or feeding. Once placement confirmed, flush tube with 30 ml of water, and then remove and save stylet, in patient medication drawer.

## 2018-09-13 NOTE — PROGRESS NOTES
Report called to [facility], RN denies any questions. Pt resting comfortably in no acute distress at this time. Daughter Arpita called and updated on POC, will continue to monitor.

## 2018-09-13 NOTE — DISCHARGE PLANNING
Anticipated Discharge Disposition: Unknown    Action: IMM    Pt is green for for transfer. Unable to present IMM due to orientation. Completed as, N/A    Barriers to Discharge: none    Plan: Remain available for assistance.

## 2018-09-14 PROBLEM — E87.6 HYPOKALEMIA: Status: ACTIVE | Noted: 2018-01-01

## 2018-09-14 PROBLEM — N17.9 ACUTE KIDNEY INJURY (HCC): Status: RESOLVED | Noted: 2018-01-01 | Resolved: 2018-01-01

## 2018-09-14 PROBLEM — N30.00 ACUTE CYSTITIS WITHOUT HEMATURIA: Status: ACTIVE | Noted: 2018-01-01

## 2018-09-14 NOTE — CARE PLAN
Problem: Safety  Goal: Will remain free from falls  Outcome: PROGRESSING AS EXPECTED  Fall precautions in place - bed in lowest position, bed alarm is on, call light and personal belongings within reach.     Problem: Skin Integrity  Goal: Risk for impaired skin integrity will decrease    Intervention: Implement precautions to protect skin integrity in collaboration with the interdisciplinary team  Q2H turns in place, placed waffle mattress on bed, using barrier creams and wipes for perineal care

## 2018-09-14 NOTE — THERAPY
"Physical Therapy Treatment completed.   Bed Mobility:  Supine to Sit: Maximal Assist  Transfers: Sit to Stand: Maximal Assist  Gait: Level Of Assist: Unable to Participate   Plan of Care: Will benefit from Physical Therapy 4 times per week  Discharge Recommendations: Equipment: Will Continue to Assess for Equipment Needs. Post-acute therapy Discharge to a transitional care facility for continued skilled therapy services.       See \"Rehab Therapy-Acute\" Patient Summary Report for complete documentation.     Pt admitted for CVA workup and presents with hypotonicity to RLE and UE. Pt requires maxA for mobility at this time. Once EOB focused on seated static balance, pt intermittently required assist for balance. Pt nonverbal throughout tx session which per previous notes is similar to previous admit. Pt will benefit from continued skille therapy to address impairments. Pt will require post acute placement upon dc.  "

## 2018-09-14 NOTE — PROGRESS NOTES
Monitor summary: SR 67-72, SD .16, QRS .08, QT .40 with frequent PVCs, frequent PACs per strip from monitor room.

## 2018-09-14 NOTE — PROGRESS NOTES
JESS orientation d/t patient being non-verbal and patient is unable to follow commands. Pt intermittently having large amount of thick white secretions, continually suctioning throughout night, no respiratory compromise noted. Pt incontinent of B&B. Q2H turns in place. Bilat wrist restraints in place d/t pulling out cortrak.

## 2018-09-14 NOTE — DISCHARGE PLANNING
Received Choice form at 3630 from [place]([facility])  Agency/Facility Name: [facility]  Referral sent per Choice form @ [phone]

## 2018-09-14 NOTE — CARE PLAN
Problem: Nutritional:  Goal: Nutrition support tolerated and meeting greater than 85% of estimated needs  Outcome: MET Date Met: 09/14/18  TF@ goal. RD following

## 2018-09-14 NOTE — DISCHARGE PLANNING
Anticipated Discharge Disposition: SNF    Action: Pt was transferred from ICU. According to notes pt's dtr, Arpita is agreeable to SNF LOC and made choice for [facility] in [place].    LSW faxed SNF CHOICE to Dana SAINI    Barriers to Discharge: SNF Acceptance; Medical Clearance    Plan: LSW to f/u with [facility]

## 2018-09-15 PROBLEM — B37.0 THRUSH: Status: ACTIVE | Noted: 2018-01-01

## 2018-09-15 PROBLEM — D63.8 ANEMIA OF CHRONIC DISEASE: Status: ACTIVE | Noted: 2018-01-01

## 2018-09-15 NOTE — ASSESSMENT & PLAN NOTE
Completed 3 days course of IV ceftriaxone.  No further diagnostic testing or treatment.  Comfort care.

## 2018-09-15 NOTE — PROGRESS NOTES
[facility]ist Progress Note    Date of Service: 9/15/2018    Chief Complaint  78 y.o. female with hx of recent CVA admitted 2018 after being found unresponsive by a neighbor.  MRI showed new embolic right MCA CVA with small right frontal hemorrhagic transformation. Also has occlusion of the left MCA.    Interval Problem Update  Easily aroused today.  More attentive.  Nonverbal.  Tolerating tube feeds.  Pulled out cortrak twice last night. Is not bridled.  Still in restraints.  In and out of a-fib/flutter.  Low grade temps this morning.  Neuro recommending coumadin to start 18.    Consultants/Specialty  Neuro (signed off)    Disposition  Placement to SNF pending.        Review of Systems   Unable to perform ROS: Patient nonverbal      Physical Exam  Laboratory/Imaging   Hemodynamics  Temp (24hrs), Av.1 °C (98.8 °F), Min:36.6 °C (97.9 °F), Max:37.6 °C (99.6 °F)   Temperature: 37.5 °C (99.5 °F)  Pulse  Av.3  Min: 55  Max: 92    Blood Pressure : 141/74      Respiratory  Respiration: 16, Pulse Oximetry: 96 %, O2 Daily Delivery Respiratory : Silicone Nasal Cannula  RUL Breath Sounds: Clear, RML Breath Sounds: Diminished, RLL Breath Sounds: Diminished, JOVI Breath Sounds: Clear, LLL Breath Sounds: Diminished    Fluids    Intake/Output Summary (Last 24 hours) at 09/15/18 1555  Last data filed at 09/15/18 1431   Gross per 24 hour   Intake             3012 ml   Output                0 ml   Net             3012 ml     Nutrition  Orders Placed This Encounter   Procedures   • Diet NPO     Standing Status:   Standing     Number of Occurrences:   1     Order Specific Question:   Restrict to:     Answer:   Strict [1]     Physical Exam   Constitutional: She appears lethargic. She appears cachectic. She is easily aroused. She appears ill. No distress. She is restrained.   HENT:   Head: Normocephalic and atraumatic.   Thrush present in oral cavity   Eyes: Pupils are equal, round, and reactive to light.  Conjunctivae are normal. Right eye exhibits no discharge. Left eye exhibits no discharge. No scleral icterus.   Neck: Normal range of motion. Neck supple. No JVD present.   Cardiovascular: Normal rate, regular rhythm, normal heart sounds and intact distal pulses.  Exam reveals no gallop and no friction rub.    No murmur heard.  Pulmonary/Chest: Effort normal. No stridor. No tachypnea. No respiratory distress. She has no decreased breath sounds. She has no wheezes. She has no rhonchi. She has no rales.   Air popping sounds in LLL   Abdominal: Soft. Bowel sounds are normal. She exhibits no distension. There is no tenderness. There is no rebound and no guarding.   Right nare cortrak in place, tube feeds infusing   Musculoskeletal: Normal range of motion. She exhibits no edema.   Neurological: She is easily aroused. She appears lethargic. A sensory deficit is present. Coordination abnormal.   Right sided weakness  Nonverbal  More easily aroused  Makes eye contact   Skin: Skin is warm and dry. No rash noted. She is not diaphoretic. No erythema.   Psychiatric: Her behavior is normal. Judgment and thought content normal. She is noncommunicative.   Nursing note and vitals reviewed.    Recent Labs      09/13/18 0428  09/14/18   0810   WBC  11.4*  12.9*   RBC  3.73*  3.64*   HEMOGLOBIN  11.4*  11.1*   HEMATOCRIT  35.4*  34.0*   MCV  94.9  93.4   MCH  30.6  30.5   MCHC  32.2*  32.6*   RDW  51.2*  50.8*   PLATELETCT  229  228   MPV  10.9  10.8     Recent Labs      09/13/18   0428  09/14/18   0810  09/15/18   0350   SODIUM  143  146*  145   POTASSIUM  3.2*  3.0*  3.2*   CHLORIDE  109  112  112   CO2  23  27  27   GLUCOSE  81  142*  111*   BUN  13  18  14   CREATININE  0.84  0.66  0.64   CALCIUM  8.7  9.3  8.7                      Assessment/Plan     * CVA (cerebral vascular accident) with hemoorhagic conversion (HCC)- (present on admission)   Assessment & Plan    Continue ASA & statin therapy.  In & out of  a-fib/flutter.  Holding off on AC until 9/17 per neuro recommendation, then will start warfarin.        Paroxysmal A-fib (HCC)- (present on admission)   Assessment & Plan    Continue to monitor cardiac rhythm on tele.  Will likely be difficult to get out of AF/F given her RA dilation on echo.  Plan to restart therapeutic AC on 9/17 per neuro recommendation.  Rate is currently controlled.  On metop.        Acute cystitis without hematuria   Assessment & Plan    Culture positive for ecoli. Will cover with 3 days of IV ceftriaxone.  Mental status mildly improved from yesterday.        Hypokalemia   Assessment & Plan    Receiving KCl in IV fluid.  Gave another 1 time dose of oral KCl today.  Mag 1.8 today, would ultimately like to see over 2. Increased dose of oral mag ox.  Recheck BMP & Mg tomorrow.        Hypertension- (present on admission)   Assessment & Plan    Losartan still at half her home dose.  Home metoprolol added this morning.  Will monitor overnight & increase losartan dose tomorrow if BP not improved.        Thrush   Assessment & Plan    Start nystatin swabs.        Hypothyroidism- (present on admission)   Assessment & Plan    Continue home levothyroxine.        Anemia of chronic disease- (present on admission)   Assessment & Plan    Workup this morning pointed to anemia of chronic disease.  H/H stable.            Quality-Core Measures   Reviewed items::  Medications reviewed, Labs reviewed and Radiology images reviewed  Petersen catheter::  No Petersen  DVT prophylaxis pharmacological::  Contraindicated - High bleeding risk  DVT prophylaxis - mechanical:  SCDs  Ulcer Prophylaxis::  No  Antibiotics:  Treating active infection/contamination beyond 24 hours perioperative coverage  Assessed for rehabilitation services:  Patient was assess for and/or received rehabilitation services during this hospitalization and Patient unable to tolerate rehabilitation therapeutic regimen

## 2018-09-15 NOTE — PROGRESS NOTES
While adjusting, turning, and oral suctioning this pt with Regis MAX, pt was incontinent of stool so a complete bed change was done.  The restraints were disconnected at the time for proper positioning of the pt. Upon leaving the room, the restraints were not reapplied, when entering the room less than ten minutes later, the pt had pulled out the cortrack.

## 2018-09-15 NOTE — PROGRESS NOTES
Monitor summary: Aflutter 79-86, WI .-, QRS .08, QT .- with rare PVC per strip from monitor room.

## 2018-09-15 NOTE — CARE PLAN
Problem: Respiratory:  Goal: Respiratory status will improve  Spoke with RT Ivis eval ordered. Patient can not put her lip and work them to do the IS. RT may be able to keep pt from getting pneumonia. Working with patient to cough and deep breath.     Problem: Mobility  Goal: Risk for activity intolerance will decrease  Outcome: PROGRESSING SLOWER THAN EXPECTED  Patient looks like she is getting foot drop left foot. Boot ordered and placed on patient. Tolerates well.

## 2018-09-15 NOTE — PROGRESS NOTES
Received bedside report from Lakshmi Landers. Patient awake, alert, non verbal but will follow commands to the best of her ability. Will request medication for mouth, boot for foot drop. Patient to be turned Q2hrs and the plan is to give the patient a bed bath and wash her hair. LIV

## 2018-09-15 NOTE — PROGRESS NOTES
[facility]ist Progress Note    Date of Service: 2018    Chief Complaint  78 y.o. female with hx of recent CVA admitted 2018 after being found unresponsive by a neighbor.  MRI showed new embolic right MCA CVA with small right frontal hemorrhagic transformation. Also has occlusion of the left MCA.    Interval Problem Update  Very somnolent, doesn't partake in conversation. No participation with interview or exam.  Tolerating tube feeds.  In and out of a-fib/flutter.  VSS.  Neuro recommending coumadin to start 18.    Consultants/Specialty  Neuro (signed  Off)    Disposition  Placement vs Hospice. Need to discuss further with family.        ROS   Physical Exam  Laboratory/Imaging   Hemodynamics  Temp (24hrs), Av.1 °C (98.8 °F), Min:36.6 °C (97.9 °F), Max:37.7 °C (99.8 °F)   Temperature: 36.6 °C (97.9 °F)  Pulse  Av.6  Min: 55  Max: 92    Blood Pressure : 148/72      Respiratory  Respiration: 16, Pulse Oximetry: 95 %  RUL Breath Sounds: Coarse Crackles, JOVI Breath Sounds: Coarse Crackles    Fluids    Intake/Output Summary (Last 24 hours) at 18 2121  Last data filed at 18 0851   Gross per 24 hour   Intake             1150 ml   Output                0 ml   Net             1150 ml     Nutrition  Orders Placed This Encounter   Procedures   • Diet NPO     Standing Status:   Standing     Number of Occurrences:   1     Order Specific Question:   Restrict to:     Answer:   Strict [1]     Physical Exam   Constitutional: She appears lethargic. She appears cachectic. She appears ill. No distress. She is restrained.   HENT:   Head: Normocephalic and atraumatic.   Eyes: Right eye exhibits no discharge. Left eye exhibits no discharge. No scleral icterus.   Neck: Normal range of motion. Neck supple. No JVD present.   Cardiovascular: Normal rate, regular rhythm, normal heart sounds and intact distal pulses.  Exam reveals no gallop and no friction rub.    No murmur heard.  Pulmonary/Chest: Effort  normal. No stridor. No respiratory distress. She has no decreased breath sounds. She has no wheezes. She has no rhonchi. She has rales in the right upper field.   Abdominal: Soft. Bowel sounds are normal. She exhibits no distension. There is no tenderness. There is no rebound and no guarding.   Right nare cortrak in place   Genitourinary:   Genitourinary Comments: Petersen catheter in place   Musculoskeletal: Normal range of motion. She exhibits no edema.   Neurological: She appears lethargic.   Skin: No rash noted. She is not diaphoretic. No erythema. There is pallor.   Psychiatric: Thought content normal. She is withdrawn. She expresses impulsivity. She is noncommunicative.   Nursing note and vitals reviewed.    Recent Labs      09/11/18 2250 09/13/18 0428 09/14/18   0810   WBC  11.2*  11.4*  12.9*   RBC  3.71*  3.73*  3.64*   HEMOGLOBIN  11.0*  11.4*  11.1*   HEMATOCRIT  35.7*  35.4*  34.0*   MCV  96.2  94.9  93.4   MCH  29.6  30.6  30.5   MCHC  30.8*  32.2*  32.6*   RDW  53.1*  51.2*  50.8*   PLATELETCT  266  229  228   MPV  10.7  10.9  10.8     Recent Labs      09/11/18 2250 09/13/18 0428  09/14/18   0810   SODIUM  142  143  146*   POTASSIUM  3.5*  3.2*  3.0*   CHLORIDE  109  109  112   CO2  24  23  27   GLUCOSE  101*  81  142*   BUN  14  13  18   CREATININE  0.86  0.84  0.66   CALCIUM  9.3  8.7  9.3             Recent Labs      09/11/18   2250   TRIGLYCERIDE  72   HDL  49   LDL  52          Assessment/Plan     * CVA (cerebral vascular accident) with hemoorhagic conversion (HCC)- (present on admission)   Assessment & Plan    Continue ASA & statin therapy.  Monitor BP closely.  In & out of a-fib/flutter.  Holding off on AC until 9/17 per neuro recommendation, then will start warfarin.        Paroxysmal A-fib (HCC)- (present on admission)   Assessment & Plan    Continue to monitor cardiac rhythm on tele.  Will likely be difficult to get out of AF/F given her RA dilation on echo.  Plan to restart  therapeutic AC on 9/17 per neuro recommendation.  Rate control, resumed home metoprolol starting tomorrow.        Acute cystitis without hematuria   Assessment & Plan    Culture positive for ecoli. Will cover with 3 days of IV ceftriaxone.  Could be contributing to her current mental status. Monitor for improvement during treatment.        Hypokalemia   Assessment & Plan    Repleted orally today. Also added KCl to her maintenance IV fluids.  Last mag level done yesterday was 1.8.  Recheck both levels tomorrow.        Hypertension- (present on admission)   Assessment & Plan    Restart home metoprolol starting tomorrow.  Consider adding home losartan depending on BP response.  Monitor BP closely.        Hypothyroidism- (present on admission)   Assessment & Plan    Continue home levothyroxine.        Normocytic anemia- (present on admission)   Assessment & Plan    Monitor CBC.  Workup to be done with tomorrow's a.m. labs.          Quality-Core Measures   Reviewed items::  Medications reviewed, Labs reviewed, Radiology images reviewed and EKG reviewed  Petersen catheter::  Neurogenic Bladder  DVT prophylaxis pharmacological::  Contraindicated - High bleeding risk  DVT prophylaxis - mechanical:  SCDs  Ulcer Prophylaxis::  No  Antibiotics:  Treating active infection/contamination beyond 24 hours perioperative coverage  Assessed for rehabilitation services:  Patient was assess for and/or received rehabilitation services during this hospitalization and Patient unable to tolerate rehabilitation therapeutic regimen

## 2018-09-15 NOTE — CARE PLAN
Progressing as expected    Problem: Safety  Goal: Will remain free from falls  Outcome: PROGRESSING AS EXPECTED  Fall precautions in place - bed in lowest position, bed alarm is on, call light and personal belongings within reach.     Problem: Skin Integrity  Goal: Risk for impaired skin integrity will decrease    Intervention: Implement precautions to protect skin integrity in collaboration with the interdisciplinary team  Q2H turns in place, placed waffle mattress on bed, using barrier creams and wipes for perineal care

## 2018-09-15 NOTE — PROGRESS NOTES
Rapid Response Team Member came by the unit to assess the patient. Stated patient could benefit from Resp. Protocol as she is unable to wrap her lips around the IS. Spoke to Ivis, order to be placed. Nurse will still work with patient to deep cough and breath. Ball has been placed in her right hand for patient to . WCTM

## 2018-09-15 NOTE — PROGRESS NOTES
Pt is able to follow commands tonight but continues to have expressive aphasia. Oral secretions have decreased but cont to need occasional suctioning of thick white secretions. Pt removed cortrak twice, despite having bilateral wrist restraints. Paged MD Buckley to place order for a bridle. Q2H turns in place.

## 2018-09-15 NOTE — PROGRESS NOTES
Monitor summary: Aflutter 72-86 with rare PVC, OR .-, QRS .08, QT .- per strip from monitor room.

## 2018-09-16 NOTE — CARE PLAN
Problem: Safety  Goal: Will remain free from falls  Outcome: PROGRESSING AS EXPECTED  Fall precautions in place - bed in lowest position, bed alarm is on, call light and personal belongings within reach.     Problem: Knowledge Deficit  Goal: Knowledge of disease process/condition, treatment plan, diagnostic tests, and medications will improve  Outcome: PROGRESSING SLOWER THAN EXPECTED  Educated pt about importance of cortrak, however pt continues to attempt to pull cortrak out. Restraint in place.

## 2018-09-16 NOTE — PROGRESS NOTES
Used Vein Finder to no avail. Can not locate vein for new IV. Notified charge nurse. Ok'd to call ICU charge nurse for assistance. ELINA

## 2018-09-16 NOTE — PROGRESS NOTES
Called Tele Monitor Tech to get reading on patient. Tele tech stated the patient converted to SR at 0539 this AM. Patient is in SR and the HR staying in the 60's. WCTM.

## 2018-09-16 NOTE — PROGRESS NOTES
Called Wound Care, notified order had been placed for consult, BMS. Spoke to the w/c nurse. Stated she would go to [employer] and get BMS mach and come assess the pt prior to going home today. ELINA

## 2018-09-16 NOTE — PROGRESS NOTES
Ivis came and assessed patient. Notified her that the patient was having more than 4 BM's a day. Dima area is getting red and raw. Ok to order medicated cream for dima area. Ivis ordered wound care w/BMS. Patient appears to be able to have some movement on her right leg. Patient remains afebrile. Upon calling her name she will open her eyes and look at you and just now she tried to smile. Denies pain at this time. [facility]

## 2018-09-16 NOTE — PROGRESS NOTES
[facility] Hospitalist Progress Note    Date of Service: 2018    Chief Complaint  78 y.o. female with hx of recent CVA admitted 2018 after being found unresponsive by a neighbor.  MRI showed new embolic right MCA CVA with small right frontal hemorrhagic transformation. Also has occlusion of the left MCA.    Interval Problem Update  Nonverbal.  Now has trace movement in RUE & RLE. Awake & alert.  Tolerating tube feeds. Restless. Still in restraints.  In and out of a-fib/flutter. AC to start tomorrow per neuro.  Still having low grade temps.  Incontinent of urine & stool.  Now having diarrhea (likely from bowel protocol).  Perineal skin is erythematous.  Sofiya cream & BMS ordered.    Consultants/Specialty  Neuro (signed off)    Disposition  Placement to SNF pending.      Review of Systems   Unable to perform ROS: Patient nonverbal      Physical Exam  Laboratory/Imaging   Hemodynamics  Temp (24hrs), Av.4 °C (99.4 °F), Min:37.1 °C (98.8 °F), Max:37.8 °C (100 °F)   Temperature: 37.2 °C (99 °F)  Pulse  Av.9  Min: 55  Max: 92    Blood Pressure : 142/58      Respiratory  Respiration: 16, Pulse Oximetry: 93 %, O2 Daily Delivery Respiratory : Silicone Nasal Cannula  RUL Breath Sounds: Clear, RML Breath Sounds: Diminished, RLL Breath Sounds: Diminished, JOVI Breath Sounds: Diminished, LLL Breath Sounds: Diminished    Fluids    Intake/Output Summary (Last 24 hours) at 18 1446  Last data filed at 18 0851   Gross per 24 hour   Intake             2050 ml   Output                0 ml   Net             2050 ml     Nutrition  Orders Placed This Encounter   Procedures   • Diet NPO     Standing Status:   Standing     Number of Occurrences:   1     Order Specific Question:   Restrict to:     Answer:   Strict [1]     Physical Exam   Constitutional: She appears cachectic. She is active. She is easily aroused. She appears ill. No distress. She is restrained.   HENT:   Head: Normocephalic and atraumatic.   Thrush  present in oral cavity   Eyes: Pupils are equal, round, and reactive to light. Conjunctivae are normal. Right eye exhibits no discharge. Left eye exhibits no discharge. No scleral icterus.   Neck: Normal range of motion. Neck supple. No JVD present.   Cardiovascular: Normal rate, regular rhythm, normal heart sounds and intact distal pulses.  Exam reveals no gallop and no friction rub.    No murmur heard.  Pulmonary/Chest: Effort normal. No accessory muscle usage or stridor. No tachypnea. No respiratory distress. She has decreased breath sounds in the right upper field, the right middle field, the right lower field, the left upper field and the left lower field. She has no wheezes. She has no rhonchi. She has no rales.   Abdominal: Soft. Bowel sounds are normal. She exhibits no distension. There is no tenderness. There is no rebound and no guarding.   Right nare cortrak in place, bridled, tube feeds infusing   Musculoskeletal: Normal range of motion. She exhibits no edema.   Neurological: She is alert and easily aroused. A sensory deficit is present. Coordination abnormal. GCS eye subscore is 4. GCS verbal subscore is 1. GCS motor subscore is 6.   Now has trace movement on right side  Follows commands on left side  Restless & impulsive  Nonverbal     Skin: Skin is warm and dry. No rash noted. She is not diaphoretic. No erythema.   Erythematous perineal area   Psychiatric: Her behavior is normal. Thought content normal. She expresses impulsivity. She is noncommunicative.   Nursing note and vitals reviewed.    Recent Labs      09/14/18   0810  09/16/18   0235   WBC  12.9*  10.6   RBC  3.64*  3.45*   HEMOGLOBIN  11.1*  10.4*   HEMATOCRIT  34.0*  32.5*   MCV  93.4  94.2   MCH  30.5  30.1   MCHC  32.6*  32.0*   RDW  50.8*  51.8*   PLATELETCT  228  256   MPV  10.8  11.4     Recent Labs      09/14/18   0810  09/15/18   0350  09/16/18   0234   SODIUM  146*  145  145   POTASSIUM  3.0*  3.2*  3.7   CHLORIDE  112  112  112    CO2  27  27  27   GLUCOSE  142*  111*  118*   BUN  18  14  18   CREATININE  0.66  0.64  0.60   CALCIUM  9.3  8.7  9.2                      Assessment/Plan     * CVA (cerebral vascular accident) with hemoorhagic conversion (HCC)- (present on admission)   Assessment & Plan    Continue ASA & statin therapy.  In & out of a-fib/flutter.  Warfarin to start tomorrow, per neuro.        Paroxysmal A-fib (HCC)- (present on admission)   Assessment & Plan    Continue to monitor cardiac rhythm on tele.  Will likely be difficult to get out of AF/F given her RA dilation on echo.  AC to start tomorrow per neuro recommendation.  Rate is currently controlled.  On metop.        Acute cystitis without hematuria   Assessment & Plan    Culture positive for ecoli. Will cover with 3 days total of IV ceftriaxone.  Mental status still improving.        Hypokalemia   Assessment & Plan    Within the normal range today.  Still getting KCl in her IV fluids.  Mag 1.9.  Recheck BMP & Mg tomorrow.        Hypertension- (present on admission)   Assessment & Plan    BP 140s-160s.  Continue home dose of metoprolol.   Increased losartan up to home dose to 100 mg daily.  Will give additional 50 mg dose this afternoon since she already received 50 mg this morning.  Continue to monitor.        Thrush   Assessment & Plan    Continue oral nystatin swabs.        Hypothyroidism- (present on admission)   Assessment & Plan    Continue home levothyroxine.        Anemia of chronic disease- (present on admission)   Assessment & Plan    H/H stable.            Quality-Core Measures   Reviewed items::  Medications reviewed and Labs reviewed  Petersen catheter::  No Petersen  DVT prophylaxis pharmacological::  Contraindicated - High bleeding risk  DVT prophylaxis - mechanical:  SCDs  Ulcer Prophylaxis::  No  Antibiotics:  Treating active infection/contamination beyond 24 hours perioperative coverage  Assessed for rehabilitation services:  Patient was assess for and/or  received rehabilitation services during this hospitalization and Patient unable to tolerate rehabilitation therapeutic regimen

## 2018-09-16 NOTE — PROGRESS NOTES
Patient's IV infiltrated. While working with patient noticed Expiratory wheezing. Patient desating down to 87. Bumped 02 up to lL. Called RT, was given instructions to turn 02 up to 3 and RT would come assess pt. o2 sats declined to 86. Bumped up to 4L. WCTM

## 2018-09-16 NOTE — PROGRESS NOTES
Received bedside report, patient resting quietly, woke up as we were giving report. Patient responds non-verbally, with eyes and nodding of head. You could tell she really liked the night shift nurse, when told good bye she was smiling. POC today involves Q2 hour turns, she is incontinent B&B, will do freq checks. Patient denies having any pain at this time. [facility]

## 2018-09-16 NOTE — PROGRESS NOTES
RT arrived. Worked with patient. 02 now at 3L Sats are at 100.    Spoke with Ivis and notified of IV infiltrating. Ok to dc IV and leave out. Ok to DC fluids. Potassium w/be replace with tube. Patient is resting quietly at this time. Looks to be pain free and very comfortable.

## 2018-09-17 NOTE — PROGRESS NOTES
Received phone call from pt daughter, she states she spoke to her brother (after he visited pt) and wanted update- notified her of CXR order and hall placement. She states that her brother wanted their mom to be put on comfort care. She asked what this was- RN explained. She states she wants that for her mom too and what they next steps were. Notified her this RN would notify palliative nurse of request and have them follow up and provide more information. She agrees, states she will call her other brother to make sure he is on the same page.

## 2018-09-17 NOTE — THERAPY
"Occupational Therapy Treatment completed with focus on ADLs.  Functional Status:  Pt seen for OT tx today.  Pt was awake during the session when her name is called.  Continues to be limited by cognition, directions following, R side weakness, and self care.  Attempted various ADL grooming activities seated unable to follow through with any activity.  Used tactile, verbal, and visual cues to initiate activities however pt didn't follow.  Pt would benefit from continued inpt OT as they are continuing to need assistance with ADLs.     Plan of Care: Will benefit from Occupational Therapy 3 times per week  Discharge Recommendations:  Equipment Will Continue to Assess for Equipment Needs.   See \"Rehab Therapy-Acute\" Patient Summary Report for complete documentation.   "

## 2018-09-17 NOTE — PROGRESS NOTES
[facility] Hospitalist Progress Note    Date of Service: 2018    Chief Complaint  78 y.o. female with hx of recent CVA admitted 2018 after being found unresponsive by a neighbor.  MRI showed new embolic right MCA CVA with small right frontal hemorrhagic transformation. Also has occlusion of the left MCA.    Interval Problem Update  Nonverbal.  Trace movement in RUE.  Otherwise flaccid on the right side.  Petersen & BMS placed due to urinary retention & diarrhea.  More respiratory distress today.  Using accessory muscles.  Cxr showed edema & atelectasis. Suspect aspirating on secretions.    Had a long talk with the family at bedside.  Her younger son told me that he & his sister want comfort care, but they needed to talk to their older brother about it.  Received call from palliative care RN shortly after stating they want the feeding tube discontinued & comfort care status.  I called & spoke with the patient's daughter who confirmed that they all decided they want comfort care initiated now with no feeding tube.  She lives in [place] & is still deciding if she is going to make the trip down here.    Consultants/Specialty  Neuro (signed off)    Disposition  Comfort care status now.  Hospice referral placed.      Review of Systems   Unable to perform ROS: Patient nonverbal      Physical Exam  Laboratory/Imaging   Hemodynamics  Temp (24hrs), Av.2 °C (98.9 °F), Min:36.3 °C (97.4 °F), Max:37.6 °C (99.7 °F)   Temperature: 37.6 °C (99.7 °F)  Pulse  Av  Min: 55  Max: 92    Blood Pressure : 137/56      Respiratory  Respiration: 15, Pulse Oximetry: 94 %, O2 Daily Delivery Respiratory : Silicone Nasal Cannula  RUL Breath Sounds: Rhonchi, RML Breath Sounds: Rhonchi, RLL Breath Sounds: Diminished, JOVI Breath Sounds: Clear;Diminished, LLL Breath Sounds: Diminished    Fluids    Intake/Output Summary (Last 24 hours) at 18 1549  Last data filed at 18 0900   Gross per 24 hour   Intake              335 ml    Output                0 ml   Net              335 ml     Nutrition  No orders of the defined types were placed in this encounter.    Physical Exam   Constitutional: She appears lethargic. She appears cachectic. She is active and cooperative. She is easily aroused. She appears ill. She appears distressed. She is restrained.   HENT:   Head: Normocephalic and atraumatic.   Thrush present in oral cavity   Eyes: Pupils are equal, round, and reactive to light. Right eye exhibits no discharge. Left eye exhibits no discharge. No scleral icterus.   Neck: Normal range of motion. Neck supple. No JVD present.   Cardiovascular: Regular rhythm, normal heart sounds and intact distal pulses.  Tachycardia present.  Exam reveals no gallop and no friction rub.    No murmur heard.  Pulmonary/Chest: Accessory muscle usage present. No stridor. Tachypnea noted. She is in respiratory distress. She has decreased breath sounds. She has wheezes. She has no rhonchi. She has no rales.   Abdominal: Soft. Bowel sounds are normal. She exhibits no distension. There is no tenderness. There is no rebound and no guarding.   Right nare cortrak in place, bridled, tube feeds infusing   Genitourinary:   Genitourinary Comments: Petersen catheter in place   Musculoskeletal: Normal range of motion. She exhibits no edema.   Neurological: She is easily aroused. She appears lethargic. A sensory deficit is present. She exhibits abnormal muscle tone. Coordination abnormal. GCS eye subscore is 4. GCS verbal subscore is 1. GCS motor subscore is 6.   Follows commands on left side  Nonverbal     Skin: Skin is warm and dry. No rash noted. She is not diaphoretic. No erythema.   Erythematous perineal area   Psychiatric: Her behavior is normal. Thought content normal. She expresses impulsivity. She is noncommunicative.   Nursing note and vitals reviewed.    Recent Labs      09/16/18   0235  09/17/18   0348   WBC  10.6  11.6*   RBC  3.45*  3.62*   HEMOGLOBIN  10.4*  10.8*    HEMATOCRIT  32.5*  34.7*   MCV  94.2  95.9   MCH  30.1  29.8   MCHC  32.0*  31.1*   RDW  51.8*  53.9*   PLATELETCT  256  254   MPV  11.4  11.2     Recent Labs      09/15/18   0350  09/16/18   0234  09/17/18   0348   SODIUM  145  145  146*   POTASSIUM  3.2*  3.7  3.8   CHLORIDE  112  112  111   CO2  27  27  29   GLUCOSE  111*  118*  121*   BUN  14  18  24*   CREATININE  0.64  0.60  0.74   CALCIUM  8.7  9.2  9.3     Recent Labs      09/17/18   0349   INR  1.01                  Assessment/Plan     * CVA (cerebral vascular accident) with hemoorhagic conversion (HCC)- (present on admission)   Assessment & Plan    Discontinue meds & monitoring.  Comfort care.        Paroxysmal A-fib (HCC)- (present on admission)   Assessment & Plan    Discontinue tele.  Comfort care.        Acute cystitis without hematuria   Assessment & Plan    Completed 3 days course of IV ceftriaxone.  No further diagnostic testing or treatment.  Comfort care.        Hypokalemia   Assessment & Plan    Discontinue labs.  Comfort care.        Hypertension- (present on admission)   Assessment & Plan    Discontinued meds.  Comfort care now.        Thrush   Assessment & Plan    Continue oral nystatin swabs for comfort.        Hypothyroidism- (present on admission)   Assessment & Plan    Discontinue home levothyroxine.  Comfort care.        Anemia of chronic disease- (present on admission)   Assessment & Plan    Discontinue monitoring.  Comfort care.            Quality-Core Measures   Reviewed items::  Medications reviewed, Labs reviewed and Radiology images reviewed  Petersen catheter::  Urinary Tract Retention or Urinary Tract Obstruction and Hospice / Comfort Care  DVT prophylaxis pharmacological::  Contraindicated - High bleeding risk  DVT prophylaxis - mechanical:  SCDs  Ulcer Prophylaxis::  No  Antibiotics:  Treating active infection/contamination beyond 24 hours perioperative coverage  Assessed for rehabilitation services:  Patient was assess for  and/or received rehabilitation services during this hospitalization and Patient unable to tolerate rehabilitation therapeutic regimen

## 2018-09-17 NOTE — PROGRESS NOTES
Noted pt had decreased output. Bladder scan showed 692. Notified Dr. Shabazz, orders to place hall catheter.

## 2018-09-17 NOTE — THERAPY
Speech Language Therapy dysphagia treatment completed.   Functional Status:  Patient awake, alert and lying in bed--WOB was increased and saturations 90-91% with audible upper airway congestion.  Suction provided and a copious amount of secretions were suctioned from the right lateral sulci and posterior oral cavity on the right side.  Oral care provided with coughing X1 on small amounts of water from sponge.  With visual and tactile cues, she did open mouth on command, but was not able to really follow any oral motor commands--difficult to determine if related to aphasia alone, but anticipate that there is an apraxia component.   She did nod head yes when name was said, and she would inconsistently respond with slight nods and shakes to yes/no questions, but the responses were not always accurate or consistent. She would spontaneously smile with notable right side facial droop present.  She did have weak pucker lips X1 following visual/verbal command to blow a kiss, but was then unable to follow again.  She did have spontaneous coughing on secretions during the session, and suction was provided.  Once breathing was back to baseline and saturations were at 94%, PO trials consisted of a single ice chip.  Patient with immediate increase in WOB with delayed coughing and no swallow was triggered.  Saturations dropped to high 80s with quick recovery after suction was provided to the moderate amount of secretions coughed up.  NO further PO trials given due to high aspiration risk.    At this time, patient is presenting with significant oral dysphagia, S/Sx of pharyngeal dysphagia, what appears to be verbal and oral apraxia and expressive and receptive aphasia. Would recommend continuation of NPO with cortrak.  If swallow function does not improve, anticipate a more permanent source of artificial nutrition will be needed (i.e. PEG tube).  Palliative care is on board.  Discussed concerns for aspiration and current  "respiratory status with APRN--she will try to speak with family regarding how aggressive they would like to be with plan of care.  Patient would benefit from an aphasia evaluation.  SLP to follow.    Recommendations: NPO with cortrak.  May need a more permanent source of artificial nutrition. Needs aphasia evaluation.  Palliative care involved  Plan of Care: Will benefit from Speech Therapy 5 times per week  Post-Acute Therapy: Discharge to a transitional care facility for continued skilled therapy services.    See \"Rehab Therapy-Acute\" Patient Summary Report for complete documentation.     "

## 2018-09-17 NOTE — PROGRESS NOTES
Inpatient Anticoagulation Service Note    Date: 9/17/2018  Reason for Anticoagulation: Atrial Fibrillation   [id number] VASc Score: 6    Hemoglobin Value: (!) 10.8  Hematocrit Value: (!) 34.7  Lab Platelet Value: 254  Target INR: 2.0 to 3.0    INR from last 7 days     Date/Time INR Value    09/17/18 0349  1.01        Dose from last 7 days     Date/Time Dose (mg)    09/17/18 0349  5        Average Dose (mg):  (New start)  Significant Interactions: Aspirin, Statin, Thyroid Medications  Bridge Therapy: No (heparin 5000 units SubQ Q8H for DVT PPx)     Reversal Agent Administered: Not Applicable  Comments: Warfarin new start for atrial fibrillation. This patient recently suffered from an ischemic stroke with small hemorrhagic conversion. Pharmacy to monitor closely.    Plan:  INR with morning labs  Education Material Provided?: No  Pharmacist suggested discharge dosing: MAYCOL Leyva

## 2018-09-17 NOTE — CARE PLAN
Problem: Safety  Goal: Will remain free from injury  Outcome: PROGRESSING AS EXPECTED  Bed locked and in lowest position. Call light and personal belongings in reach. Bed alarm in place. Hourly rounding.     Problem: Skin Integrity  Goal: Risk for impaired skin integrity will decrease  Outcome: PROGRESSING AS EXPECTED  Waffle overlay mattress in use. Q2 turns. Pillows in use for support and positioning. BMS in use. Barrier wipes and cream in use.

## 2018-09-17 NOTE — PROGRESS NOTES
Monitor summary: SB 59-SR 72, [place] . 16, QRS .08, QT .40 with afib 70 per strip from monitor room.

## 2018-09-17 NOTE — PALLIATIVE CARE
Palliative Care follow-up  Received call from pts daughter, Arpita, requesting update on pts status with DC to [facility] as she is attempting to co-ordinate moving the things out of an apartment. Provided Arpita with update from [facility] note that they are currently awaiting a call back from Admissions at [facility]. Let Arpita know that I will update the Unit JAMESON RN or RAHEEL that is working on her case and request that they call her with any updates.     Plan: Will update Unit SW and/or JAMESON RN on daughters request for update on her mothers DC.     Thank you for allowing Palliative Care to participate in this patient's care. Please feel free to call [phone] with any questions or concerns.

## 2018-09-17 NOTE — PALLIATIVE CARE
"PALLIATIVE CARE FOLLOW-UP:    Pt's dtr Arpita phoned PC office ([phone]).  She stated that, after speaking with her brother, that they have decided to transition pt to comfort care with no tube feeding/artificial nutrition.  Arpita said, \"This is just suffering for my mom and I want her to be comfortable.\"      Explained that neither comfort care nor hospice is 'a place' but a type of medical care.  Arpita verbalized understanding and was still hopeful that pt could d/c to [facility] in [place] where they live with hospice seeing pt there.  Explained that there is not a hospice agency in that area, except maybe [facility]; but that  could look into.    Updated/discussed with: JIMMIE Lucia, left message for RAHEEL Isbell [phone]    Plan:  Comfort care, hospice d/c to facility ([facility]    Thank you for allowing Palliative Care to support this pt and her family.  Contact [phone] for additional assistance, questions or concerns.  "

## 2018-09-17 NOTE — CARE PLAN
Problem: Discharge Barriers/Planning  Goal: Patient's continuum of care needs will be met  Outcome: PROGRESSING SLOWER THAN EXPECTED  Still waiting on placement to SNF. Possible hospice consult.     Problem: Respiratory:  Goal: Respiratory status will improve  Outcome: PROGRESSING SLOWER THAN EXPECTED  Pt with expiratory wheezes and diminished lung sounds. Pt having increased secretions and unable to manage them. RT called to see pt. Increased oxygen to 2L. SLP concerned for pt, notified hospitalist RN, orders for CXR.

## 2018-09-17 NOTE — PROGRESS NOTES
Monitor Summary: SB-SR 55-68, KS .18, QRS .08, QT .40 with rare PVC & PAC per strip from monitor room

## 2018-09-17 NOTE — PROGRESS NOTES
Pt O2 sat 84%, increased oxygen to 2L. Increased effort of breathing. Lung sounds tight/diminished. Called RT to see if they can assess pt and possibly give breathing treatment. They state they will see pt.

## 2018-09-17 NOTE — PROGRESS NOTES
Assumed care of pt at 1900. 1 L O2 via NC,  in place. Called RT for treatment this evening, pt had expiratory wheezing. Pt sat >92% on 1 L. Left wrist restraint in use to prevent pt from pulling out cortrak. Replete fiber running at 55 mL/hr, tolerating well. Prafo boot applied to right foot. Hourly rounding in place.

## 2018-09-17 NOTE — WOUND TEAM
Wound team note:   Pt seen for placement of BMS. MD order verified. Pt assessed, noted to be incontinent of liquid yellow stool. Sacrum/buttocks red, intact, excoriated but blanching. Sphincter tone determined to be adequate. BMS placed without difficulty, 40 mL of tap water placed in retention balloon, irrigated with 60 mL warm tap water. Nursing to irrigate q shift with 60 mL-120 mL warm tap water or until patent. Cholestyramine in aquaphor ointment applied per order. Pt turned to their right side with pillows under the left

## 2018-09-18 PROBLEM — E87.6 HYPOKALEMIA: Status: RESOLVED | Noted: 2018-01-01 | Resolved: 2018-01-01

## 2018-09-18 PROBLEM — N30.00 ACUTE CYSTITIS WITHOUT HEMATURIA: Status: RESOLVED | Noted: 2018-01-01 | Resolved: 2018-01-01

## 2018-09-18 NOTE — DISCHARGE PLANNING
Received Choice form at 3615 from Akilah(ENRRIQUE)   Agency/Facility Name: [facility]  Referral sent per Choice form @ [id number]

## 2018-09-18 NOTE — DIETARY
Nutrition Services: Update   Pt's status has changed to comfort care measures.  TFs discontinued appropriately.    Please consult RD as needed.

## 2018-09-18 NOTE — PROGRESS NOTES
Family at bedside, updated on POC. Paged respiratory therapy for breathing treatment. Petersen and rectal tube in place for comfort. Q2 turns. Hourly rounding in place.

## 2018-09-18 NOTE — DISCHARGE PLANNING
Agency/Facility Name: [facility]  Spoke To: Linda(Admissions)  Outcome: Patient declined due to no bed availability. Akilah(LSW) notified.

## 2018-09-18 NOTE — PROGRESS NOTES
Pt not tolerating sublingual medications. Pt coughing and is making pt more agitated. Suctioned pt- large, thick amount of secretions. Placed IV to L AC in order for pt to received pain and anxiety medications effectively.

## 2018-09-18 NOTE — PROGRESS NOTES
Pt tolerating IV morphine well, most comfortable she has appeared this shift. Pt daughter and son at bedside requesting IV ativan for their mom, stating they want her to be as comfortable as possible.

## 2018-09-18 NOTE — PROGRESS NOTES
[facility] Hospitalist Progress Note    Date of Service: 2018    Chief Complaint  78 y.o. female with hx of recent CVA admitted 2018 after being found unresponsive by a neighbor.  MRI showed new embolic right MCA CVA with small right frontal hemorrhagic transformation. Also has occlusion of the left MCA.    Interval Problem Update  -Patient continues to be nonverbal, with trace movement in RUE. Otherwise remains flaccid on the right side.  Opens eyes to verbal stimulation.  -Petersen & fecal management device in place due to urinary retention & diarrhea.  -Patient with mild respiratory distress today, and on no oxygen therapy with intermittent use of accessory muscles.  Chest x-ray showed edema and atelectasis previous likely suspect aspirating secretions.      Consultants/Specialty  Neurology -signed off  Pulmonology -signed off  Palliative care -following  Hospice     Disposition  Comfort care status -pending hospice consult for placement and plan of care.  Patient's daughter to be contacted by social work to make decision about sending patient home with hospice in [place], [place] however, only volunteer hospice available in [place], [place] and will clarify with daughter if willing to take home and further care for patient's mother.        Review of Systems   Unable to perform ROS: Patient nonverbal      Physical Exam  Laboratory/Imaging   Hemodynamics  Temp (24hrs), Av.4 °C (99.3 °F), Min:37.3 °C (99.1 °F), Max:37.4 °C (99.4 °F)   Temperature: 37.3 °C (99.1 °F)  Pulse  Av.3  Min: 55  Max: 92    Blood Pressure : 152/68      Respiratory  Respiration: 20, Pulse Oximetry: (!) 80 %  RUL Breath Sounds: Rhonchi, RML Breath Sounds: Rhonchi, RLL Breath Sounds: Diminished, JOVI Breath Sounds: Rhonchi, LLL Breath Sounds: Diminished    Fluids    Intake/Output Summary (Last 24 hours) at 18 1538  Last data filed at 18 0600   Gross per 24 hour   Intake              400 ml   Output             1250 ml   Net              -850 ml     Nutrition  No orders of the defined types were placed in this encounter.    Physical Exam   Constitutional: Vital signs are normal. She appears lethargic. She appears cachectic. She is easily aroused. She appears ill. She appears distressed.   HENT:   Head: Normocephalic and atraumatic.   Thrush present in oral cavity   Eyes: Pupils are equal, round, and reactive to light. Right eye exhibits no discharge.   Neck: Normal range of motion. Neck supple. No JVD present.   Cardiovascular: Regular rhythm, normal heart sounds and intact distal pulses.  Tachycardia present.  Exam reveals no gallop and no friction rub.    No murmur heard.  Pulmonary/Chest: Accessory muscle usage present. No stridor. Tachypnea noted. She is in respiratory distress. She has decreased breath sounds. She has wheezes. She has no rhonchi. She has no rales.   Abdominal: Soft. Bowel sounds are normal. She exhibits no distension. There is no tenderness.   Right nare cortrak in place, bridled, tube feeds infusing   Genitourinary:   Genitourinary Comments: Petersen catheter in place   Musculoskeletal: Normal range of motion. She exhibits no edema.   Neurological: She is easily aroused. She appears lethargic. A sensory deficit is present. She exhibits abnormal muscle tone. Coordination abnormal. GCS eye subscore is 4. GCS verbal subscore is 1. GCS motor subscore is 5.   Follows commands on left side  Nonverbal     Skin: Skin is warm and dry. No rash noted. She is not diaphoretic. No erythema.   Erythematous perineal area   Psychiatric: She is noncommunicative.   Nursing note and vitals reviewed.    Recent Labs      09/16/18   0235  09/17/18   0348   WBC  10.6  11.6*   RBC  3.45*  3.62*   HEMOGLOBIN  10.4*  10.8*   HEMATOCRIT  32.5*  34.7*   MCV  94.2  95.9   MCH  30.1  29.8   MCHC  32.0*  31.1*   RDW  51.8*  53.9*   PLATELETCT  256  254   MPV  11.4  11.2     Recent Labs      09/16/18   0234  09/17/18   0348   SODIUM  145  146*    POTASSIUM  3.7  3.8   CHLORIDE  112  111   CO2  27  29   GLUCOSE  118*  121*   BUN  18  24*   CREATININE  0.60  0.74   CALCIUM  9.2  9.3     Recent Labs      09/17/18   0349   INR  1.01                  Assessment/Plan     * CVA (cerebral vascular accident) with hemoorhagic conversion (HCC)- (present on admission)   Assessment & Plan    No current issues meds discontinued as patient is on comfort care status -pending hospice consult for placement and plan of care.          Paroxysmal A-fib (HCC)- (present on admission)   Assessment & Plan    Telemetry discontinued with no current issues as patient is on comfort care status         Hypertension- (present on admission)   Assessment & Plan    No current issues meds discontinued as patient is on comfort care status         Thrush   Assessment & Plan    Continue oral nystatin swabs for comfort.        Hypothyroidism- (present on admission)   Assessment & Plan    Discontinue home levothyroxine as patient is on comfort care status        Anemia of chronic disease- (present on admission)   Assessment & Plan    Stable with no acute signs of bleeding, monitoring discontinued as patient is on comfort care status               Quality-Core Measures   Reviewed items::  Medications reviewed, Labs reviewed, Radiology images reviewed and EKG reviewed  Petersen catheter::  Urinary Tract Retention or Urinary Tract Obstruction and Hospice / Comfort Care  DVT prophylaxis pharmacological::  Contraindicated - High bleeding risk  DVT prophylaxis - mechanical:  SCDs  Ulcer Prophylaxis::  No  Assessed for rehabilitation services:  Patient was assess for and/or received rehabilitation services during this hospitalization and Patient unable to tolerate rehabilitation therapeutic regimen          INGRIS Groves

## 2018-09-18 NOTE — WOUND TEAM
Wound consult placed for replacement of BMS.  TC staff RN who reports that we will trial leaving BMS out as patient sedated and not stooling as she was prior.  Instructed her to rinse BMS tubing and leave in bathroom.  Will FU tomorrow or staff can replace consult.

## 2018-09-18 NOTE — CARE PLAN
Problem: Pain  Goal: Alleviation of Pain or a reduction in pain to the patient's comfort goal  Outcome: PROGRESSING AS EXPECTED  Liquid Morphine given for pt restlessness and increased respirations. Pt now resting comfortably.     Problem: Elimination  Goal: Bowel elimination support  Outcome: PROGRESSING AS EXPECTED  Pt has rectal tube in place- keeping skin intact.   Goal: Urinary elimination support  Outcome: PROGRESSING AS EXPECTED  Petersen in place, helping to keep pt dry and comfortable.

## 2018-09-18 NOTE — THERAPY
SPEECH PATHOLOGY:  Patient transitioned to comfort care. Will DC SLP services at this time.  Please reconsult SLP should there be a change in status warranting reassessment.  Thank you for allowing me to participate in this patient's care.

## 2018-09-18 NOTE — DISCHARGE PLANNING
"Anticipated Discharge Disposition: Hospice    Action: LSW met with pt's son, Henry and dtr in lawDylanki bedside. Discussed hospice. CHOICE made for [facility] to screen for GIP.    LSW faxed CHOICE to Dana SAINI    LSW met with pt's dtrArpita and pt's son, Dylan bedside. Supplied Hospice care jagdish and \"Helpful information During this Time\" booklet. The family has decided on cremation and are discussing  homes, but believe they will be going with [facility].      Barriers to Discharge: None    Plan: LSW to f/u with [facility]    "

## 2018-09-19 NOTE — PROGRESS NOTES
Patient resting in bed, no events during shift. Patient given prn pain medication this morning, see mar.

## 2018-09-19 NOTE — CARE PLAN
Problem: Safety  Goal: Free from accidental injury  Outcome: PROGRESSING AS EXPECTED  Fall and aspiration precautions in place. Bed alarm on. Hourly rounding in place.     Problem: Pain  Goal: Alleviation of Pain or a reduction in pain to the patient's comfort goal  Outcome: PROGRESSING AS EXPECTED  To keep pt comfortable. Pt resting comfortably in bed. PRN meds available. Monitoring every hour.

## 2018-09-19 NOTE — WOUND TEAM
Spoke with staff RN who reports no need to replace BMS.  Instructed to throw away tubing and she has already removed LDA.  No further follow up by wound team.

## 2018-09-19 NOTE — PROGRESS NOTES
Pt discharging from hospital inpatient and being admitted under hospice as GIP. Hospice nurse at bedside.

## 2018-09-19 NOTE — DISCHARGE PLANNING
[facility] MSW    GIP admit.  CVA, with s/s of right sided weakness and right sided facial droop. Patient was previously referred through  Care Coordination to [facility] per family request   No available beds.     Bedside encounter.  No family present.  Patient not responsive to name.  Shallow breathing.  Looks very comfortable.  No signs of agitation or discomfort.  Patient has daughter, Arpita Sample [phone] and 2 sons Gato and Henry Mcdowell.  Code status DNAR/DNI.   MSW spoke briefly with Catina DOWNS for patient.  Family has called in today for updates. I am told they have said their goodbyes.   Spiritual support previously offered, but documentation indicates daughter declined.      MSW called DTR Arpita and offered active listening and empathy.  Family is choosing [facility] and Arpita stated her brother's were handling that piece. MSW provided his work cell and encouraged family to reach out should they additional support or referrals.     MSW to follow.

## 2018-09-19 NOTE — DISCHARGE SUMMARY
Discharge Summary    CHIEF COMPLAINT ON ADMISSION  Chief Complaint   Patient presents with   • Possible Stroke     last spoke on phone with daughter at 2045    • Facial Droop   • Aphasia   • T-5000 GLF   • Shoulder Pain     r shoulder bruise       Reason for Admission  EMS     CODE STATUS  Comfort Care/DNR    HPI & HOSPITAL COURSE  78 y.o. female who presented 9/11/2018 with *Possible Stroke (last spoke on phone with daughter at 2045 ); Facial Droop; Aphasia; T-5000 GLF; and Shoulder Pain (r shoulder bruise). Diagnosed with subtle L frontal infarct with hemorrhagic conversion early August 2018. She remained globally aphasic and weak. Possibility of paroxysmal atrial fibrillation per Cardiology but nothing on cardiac strips during that time. She was discharged to rehab with a tentative plan to start chronic anticoagulation after 2 weeks.  Dischared from rehab to assisted/senior living with [facility] late August 2018, and it was preferred by the rehab attending to follow up outpatient to discuss and initiate chronic anticoagulation.  Patient was admitted this visit after being found down and unresponsive by a neighbor. MRI showed new embolic right MCA CVA with small right frontal hemorrhagic transformation. Patient also had left MCA occlusion. She was admitted and seen by neurology and pulmonology. After a howard discussion was had with the family, the patient was made comfort care. The family is now working on getting her affairs settled. The patient will be discharged to [facility].        Therefore, she is discharged in guarded and stable condition to hospice.    The patient met 2-midnight criteria for an inpatient stay at the time of discharge.      DISCHARGE DIAGNOSES  Principal Problem:    CVA (cerebral vascular accident) with hemoorhagic conversion (HCC) POA: Yes  Active Problems:    Paroxysmal A-fib (HCC) POA: Yes    Hypertension (Chronic) POA: Yes    Anemia of chronic disease POA: Yes    Hypothyroidism POA: Yes     Thrush POA: No  Resolved Problems:    Hypokalemia POA: Unknown    Acute cystitis without hematuria POA: Unknown    Acute kidney injury (HCC) POA: Yes      FOLLOW UP  No future appointments.  No follow-up provider specified.    MEDICATIONS ON DISCHARGE     Medication List      STOP taking these medications    aspirin 81 MG Chew chewable tablet  Commonly known as:  ASA     atorvastatin 40 MG Tabs  Commonly known as:  LIPITOR     levothyroxine 100 MCG Tabs  Commonly known as:  SYNTHROID     losartan 100 MG Tabs  Commonly known as:  COZAAR     metoprolol SR 25 MG Tb24  Commonly known as:  TOPROL XL            Allergies  Allergies   Allergen Reactions   • Codeine Unspecified     Per daughter pt gets dizzy       DIET  No orders of the defined types were placed in this encounter.      LINES, DRAINS, AND WOUNDS  This is an automated list. Peripheral IVs will be removed prior to discharge.  PIV Group Left Antecubital 22g Flexible Catheter (Active)   Line Secured Taped;Transparent 9/18/2018 11:30 AM   Site Condition / Description Assessed;Patent;Clean;Dry;Intact 9/18/2018 11:30 AM   Dressing Type / Description Transparent;Clean;Dry;Intact 9/18/2018 11:30 AM   Dressing Status Initial Dressing 9/18/2018 11:30 AM   Saline Locked Yes 9/18/2018 11:30 AM   Infiltration Grading Used by [facility] [place] 9/18/2018 11:30 AM   Phlebitis Scale (Used by [facility]) 0 9/18/2018 11:30 AM     Urinary Catheter Indwelling Catheter 16 (Active)   Catheter State [place] 9/18/2018  7:45 AM   Skin Integrity Intact 9/18/2018  7:45 AM   Catheter Secured Yes 9/18/2018  7:45 AM   Collection Device Changed No 9/18/2018  7:45 AM   Output (mL) 550 mL 9/18/2018  6:00 AM       Rectal Tube Group Bowel Management System (Active)   Skin Care Area cleansed;Protective Barrier Applied 9/18/2018  7:45 AM   Skin Integrity Red 9/18/2018  7:45 AM   Collection Device Changed No 9/18/2018  7:45 AM   Tube Drainage Present 9/18/2018  7:45 AM   Flushed Per Protocol Yes  9/18/2018  7:45 AM   Time Spent with Patient (mins) 45 9/16/2018  5:00 PM      Incontinence Associated Dermatitis Buttock;Perineum (Active)   Drainage  None 9/18/2018  7:45 AM   Periwound Skin Pink;Red;Normal;Excoriated 9/18/2018  7:45 AM   Cleansing Dimethecone Wipes 9/18/2018  7:45 AM   Periwound Protectant Moisture Barrier 9/18/2018  7:45 AM              Urinary Catheter Indwelling Catheter 16 (Active)   Catheter State [place] 9/18/2018  7:45 AM   Skin Integrity Intact 9/18/2018  7:45 AM   Catheter Secured Yes 9/18/2018  7:45 AM   Collection Device Changed No 9/18/2018  7:45 AM   Output (mL) 550 mL 9/18/2018  6:00 AM        MENTAL STATUS ON TRANSFER  Level of Consciousness: Lethargic  Orientation : Unable to Assess  Speech: Global Aphasia    CONSULTATIONS  Pulmonologist Dr. Jones  [facility]  [facility]    PROCEDURES  EEG:  This is an abnormal video EEG recording in the awake and drowsy state(s).  Left hemispheric slowing, more so on the left temporal region, as well as frequent left temporal sharps noted. The findings increase risk for seizures and suggest underlying area of cortical irritability and structural abnormality. No seizures captured during the study. Clinical and radiological correlation is recommended.     LABORATORY  Lab Results   Component Value Date    SODIUM 146 (H) 09/17/2018    POTASSIUM 3.8 09/17/2018    CHLORIDE 111 09/17/2018    CO2 29 09/17/2018    GLUCOSE 121 (H) 09/17/2018    BUN 24 (H) 09/17/2018    CREATININE 0.74 09/17/2018        Lab Results   Component Value Date    WBC 11.6 (H) 09/17/2018    HEMOGLOBIN 10.8 (L) 09/17/2018    HEMATOCRIT 34.7 (L) 09/17/2018    PLATELETCT 254 09/17/2018        Total time of the discharge process exceeds 42 minutes.

## 2018-09-20 NOTE — PROGRESS NOTES
Weight: 58.5 kg (129 lb)    Isolation:   Accident:No [2]    Infant:  Weight     Weeks Gestation:     --------------------PRONOUNCEMENT--------------------   Death Date: 09/19/18   Death Time: 1730                           Pronounced By (RN1): Nanci Moses      Pronounced By (RN2): Davida Rouse    ------------------------------------FAMILY NOTIFICATION--------------------------------------------  Family Notified Name (1): Arpita Sample  Time: 1740    Relationship: Daughter   How Notified: Phone   Phone Numbers: [phone]                              --------------------PHYSICIAN NOTIFICATION----------------  Physician Name(1): Ena Abraham    Time: 1738   How Notified: Phone    -------------------------, AUTOPSY  & MORTUARY--------------------------                Name  Notified ([phone]): Marycarmen Gold   Time  Notified: 1745               Accepted Case: No  Autopsy: No                                  -------------------------ORGAN/TISSUE DONATION-------------------------  Tissue Bank: [facility] [place] [phone] or [phone]: Notified    Name Person Spoke With: Juancho Vasquez  Donor: Pending: Tissue Bank to Speak with Family    -------------------------BELONGINGS-------------------------         Dentures: Upper                                      Belongings Disposition: Security        -------------------------IDENTIFICATION-------------------------

## 2020-01-01 NOTE — CARE PLAN
Problem: Safety  Goal: Will remain free from falls  Outcome: PROGRESSING AS EXPECTED  Reviewed patient's mobility status, discussed with care team of patient needs, verifying appropriate safety precautions in place, providing patient education, ensuring call lights are within reach, non-slip socks in use, evaluating needs alarms & monitoring every shift, continuing with current plan of care.      Problem: Knowledge Deficit  Goal: Knowledge of disease process/condition, treatment plan, diagnostic tests, and medications will improve  Outcome: PROGRESSING AS EXPECTED  Educated patient about POC, activities, encouraging patient to ask questions, providing answers to patient's questions, educating patient about medications, encouraging patient involvement in care process. Continuing with current POC.         negative

## 2020-12-07 NOTE — DISCHARGE SUMMARY
Death Summary    Cause of Death  CVA with hemorrhagicconversion due to PAF due to HTN     Comorbid Conditions at the Time of Death  Principal Problem:    CVA (cerebral vascular accident) with hemoorhagic conversion (HCC) POA: Yes  Active Problems:    Paroxysmal A-fib (HCC) POA: Yes    Hypertension (Chronic) POA: Yes    Anemia of chronic disease POA: Yes    Hypothyroidism POA: Yes    Thrush POA: No  Resolved Problems:    Hypokalemia POA: Unknown    Acute cystitis without hematuria POA: Unknown    Acute kidney injury (HCC) POA: Yes      History of Presenting Illness and Hospital Course  This is a 78 y.o. female admitted 9/18/2018 with  Hospice admitting diagnosis of CVA with hemorrhagicconversion due to PAF due to HTN. She presented 9/11/2018 with *Possible Stroke (last spoke on phone with daughter at 2045 ); Facial Droop; Aphasia; T-5000 GLF; and Shoulder Pain (r shoulder bruise). Diagnosed with subtle L frontal infarct with hemorrhagic conversion early August 2018. She remained globally aphasic and weak. Possibility of paroxysmal atrial fibrillation per Cardiology but nothing on cardiac strips during that time. She was discharged to rehab with a tentative plan to start chronic anticoagulation after 2 weeks.  Dischared from rehab to assisted/senior living with [facility] late August 2018, and it was preferred by the rehab attending to follow up outpatient to discuss and initiate chronic anticoagulation.  Patient was admitted this visit after being found down and unresponsive by a neighbor. MRI showed new embolic right MCA CVA with small right frontal hemorrhagic transformation. Patient also had left MCA occlusion. She was admitted and seen by neurology and pulmonology. After a howard discussion was had with the family, the patient was made comfort care. The family is now working on getting her affairs settled. The patient will be discharged to [facility].        Death Date: 09/19/18   Death Time: 1730         Pronounced By  (RN1): Nanci Moses  Pronounced By (RN2): Davida Ruose     [Normal] : affect appropriate [100: Fully active, normal.] : 100: Fully active, normal. [de-identified] : no active bleeding noted, no dried blood noted

## 2021-08-30 NOTE — PROGRESS NOTES
Message sent to patient in [facility].    Pulmonary Critical Care Progress Note      DOA: 9/11/2018    Chief Complaint: unresponsive    History of Present Illness: 78 y.o. female with a past medical history of recent left frontal thromboembolic stroke early in August 2018, is more atrial fibrillation, was found unresponsive earlier today to the hospital for evaluation.  Admitted to ICU for frequent neurologic monitoring and neurologic evaluation, therefore ICU consult      Interval Events:  24 hour interval history reviewed   Tm 99.3  +195cc over last 24hr  Mri brain pending  Renal us with nonobstructing stone on left  Wbc 11  Na 142    LR @ 75  Ceftriaxone    Review of Systems   Unable to perform ROS: Acuity of condition     Physical Exam   Constitutional:   Very frail   HENT:   Head: Normocephalic and atraumatic.   Eyes: Pupils are equal, round, and reactive to light. Conjunctivae are normal.   Neck: No tracheal deviation present.   Cardiovascular: Normal rate and intact distal pulses.    Pulmonary/Chest: She has no wheezes. She has no rales.   Abdominal: Soft. She exhibits no distension. There is no tenderness.   Musculoskeletal: She exhibits no edema.   Neurological: No cranial nerve deficit.   Skin: Skin is warm and dry. She is not diaphoretic.   Psychiatric:   encephalopathic   Nursing note and vitals reviewed.      PFSH:  No change.    Respiratory:     Pulse Oximetry: 96 %  Chest Tube Drains:                  Invalid input(s): [id number]    HemoDynamics:  Pulse: (!) 59, Heart Rate (Monitored): (!) 58  Blood Pressure : 146/82, NIBP: 153/64               Neuro:  GCS Total Lev Coma Score: 11           Fluids:  Intake/Output       09/10/18 0700 - 09/11/18 0659 (Not Admitted) 09/11/18 0700 - 09/12/18 0659 09/12/18 0700 - 09/13/18 0659      0202-2391 2871-1431 Total 9416-7975 9310-5873 Total 1320-5876 5173-6489 Total       Intake    I.V.  --  -- --  --  750 750  --  -- --    IV Piggyback Volume (IV Piggyback) -- -- -- -- 100 100 -- -- --    IV  Volume (LR) -- -- -- -- 650 650 -- -- --    Total Intake -- -- -- -- 750 750 -- -- --       Output    Urine  --  -- --  --  555 555  --  -- --    Output (mL) (Urinary Catheter Indwelling Catheter) -- -- -- -- 555 555 -- -- --    Stool  --  -- --  --  -- --  --  -- --    Number of Times Stooled -- -- -- -- 1 x 1 x -- -- --    Total Output -- -- -- -- 496 555 -- -- --       Net I/O     -- -- -- -- 195 195 -- -- --        Weight: 58.2 kg (128 lb 4.9 oz)  Recent Labs      18   2250   SODIUM  142  142   POTASSIUM  3.6  3.5*   CHLORIDE  109  109   CO2  23  24   BUN  14  14   CREATININE  0.81  0.86   CALCIUM  9.0  9.3       GI/Nutrition:    Liver Function  Recent Labs      18   17218   2250   GLUCOSE  101*  101*       Heme:  Recent Labs      18   17218   2250   RBC  3.49*  3.71*   HEMOGLOBIN  10.7*  11.0*   HEMATOCRIT  32.7*  35.7*   PLATELETCT  250  266       Infectious Disease:  Monitored Temp 2  Av.2 °C (99 °F)  Min: 36.4 °C (97.5 °F)  Max: 37.4 °C (99.3 °F)  Temp  Av.4 °C (97.5 °F)  Min: 36.4 °C (97.5 °F)  Max: 36.4 °C (97.5 °F)  Micro: cultures reviewed  Recent Labs      18   17218   2250   WBC  11.8*  11.2*   NEUTSPOLYS  66.70   --    LYMPHOCYTES  23.80   --    MONOCYTES  6.40   --    EOSINOPHILS  2.50   --    BASOPHILS  0.30   --      Current Facility-Administered Medications   Medication Dose Frequency Provider Last Rate Last Dose   • oxymetazoline (AFRIN) 0.05 % nasal spray 2 Spray  2 Spray QDAY PRN Reji Jones M.D.       • atorvastatin (LIPITOR) tablet 40 mg  40 mg DAILY Garett Borja M.D.   Stopped at 18 0600   • levothyroxine (SYNTHROID) tablet 100 mcg  100 mcg AM ES Garett Borja M.D.   Stopped at 18 0600   • senna-docusate (PERICOLACE or SENOKOT S) 8.6-50 MG per tablet 2 Tab  2 Tab BID Garett Borja M.D.   Stopped at 18 1800    And   • polyethylene glycol/lytes (MIRALAX) PACKET 1 Packet  1 Packet  QDAY PRN Garett Borja M.D.        And   • magnesium hydroxide (MILK OF MAGNESIA) suspension 30 mL  30 mL QDAY PRN Garett Borja M.D.        And   • bisacodyl (DULCOLAX) suppository 10 mg  10 mg QDAY PRN Garett Borja M.D.       • labetalol (NORMODYNE,TRANDATE) injection 10 mg  10 mg Q4HRS PRN Garett Borja M.D.       • hydrALAZINE (APRESOLINE) injection 10 mg  10 mg Q6HRS PRN Garett Borja M.D.       • MD ALERT - For SBP greater or equal to 160 mmHg   PRN Garett Borja M.D.       • cefTRIAXone (ROCEPHIN) 1 g in  mL IVPB  1 g Q24HRS Reji Jones M.D.   Stopped at 09/11/18 2333   • ipratropium (ATROVENT) 0.02 % nebulizer solution 0.5 mg  0.5 mg TID (RT) Reji Jones M.D.   Stopped at 09/11/18 2200   • lactated ringers infusion   Continuous Reji Jones M.D. 75 mL/hr at 09/11/18 2331     • aspirin (ASA) suppository 300 mg  300 mg DAILY Reji Jones M.D.   Stopped at 09/12/18 0600   • heparin injection 5,000 Units  5,000 Units Q8HRS Reji Jones M.D.   Stopped at 09/12/18 0600   • midazolam (VERSED) 2 MG/2ML injection 1-2 mg  1-2 mg Q30 MIN PRN Reji Jones M.D.         Last reviewed on 9/11/2018  5:04 PM by Pilar Gilliam WHIT    Quality  Measures:  Medications reviewed, Labs reviewed and Radiology images reviewed                      Assessment/Plan:  Recurrent CVA   - mri brain w acute infarct left superior frontal and parasylvian region   - serial q1 hour neuro exams   - aspiration/sz precautions   - high intensity statin   - asa when ok with neuro   - previous plan for full AC - this will likely now need to be postponed      Encephalopathy   - related to above   - eeg to rule out underlying sz activity   - minimize mind altering/sedating meds    Paroxysmal atrial fibrillation   - continue tele monitoring   - currently sinus   - eventual full ac    Htn   - resume home regimen    Hypothyroidism   - on replacement   - reviewed tft 9/11    Tob Use   - ? If  she quit since last cva    Discussed patient condition and risk of morbidity and/or mortality with RN, RT, Therapies, Pharmacy and hospitalist.    The patient remains critically ill.  Critical care time = 31 minutes in directly providing and coordinating critical care and extensive data review.  No time overlap and excludes procedures.